# Patient Record
Sex: FEMALE | Race: WHITE | NOT HISPANIC OR LATINO | Employment: OTHER | URBAN - METROPOLITAN AREA
[De-identification: names, ages, dates, MRNs, and addresses within clinical notes are randomized per-mention and may not be internally consistent; named-entity substitution may affect disease eponyms.]

---

## 2017-03-15 RX ORDER — CHLORTHALIDONE 25 MG/1
TABLET ORAL
Qty: 90 TABLET | Refills: 2 | Status: SHIPPED | OUTPATIENT
Start: 2017-03-15 | End: 2018-03-21 | Stop reason: SDUPTHER

## 2017-04-04 ENCOUNTER — HOSPITAL ENCOUNTER (EMERGENCY)
Facility: HOSPITAL | Age: 82
Discharge: HOME | End: 2017-04-04
Attending: EMERGENCY MEDICINE | Admitting: EMERGENCY MEDICINE

## 2017-04-04 ENCOUNTER — APPOINTMENT (OUTPATIENT)
Dept: GENERAL RADIOLOGY | Facility: HOSPITAL | Age: 82
End: 2017-04-04

## 2017-04-04 ENCOUNTER — APPOINTMENT (OUTPATIENT)
Dept: CT IMAGING | Facility: HOSPITAL | Age: 82
End: 2017-04-04

## 2017-04-04 VITALS
TEMPERATURE: 98.5 F | HEART RATE: 72 BPM | BODY MASS INDEX: 23.41 KG/M2 | HEIGHT: 61 IN | DIASTOLIC BLOOD PRESSURE: 85 MMHG | WEIGHT: 124 LBS | RESPIRATION RATE: 18 BRPM | OXYGEN SATURATION: 94 % | SYSTOLIC BLOOD PRESSURE: 161 MMHG

## 2017-04-04 DIAGNOSIS — J18.9 PNEUMONIA OF LEFT UPPER LOBE DUE TO INFECTIOUS ORGANISM: Primary | ICD-10-CM

## 2017-04-04 DIAGNOSIS — R07.9 CHEST PAIN, UNSPECIFIED TYPE: ICD-10-CM

## 2017-04-04 LAB
ALBUMIN SERPL-MCNC: 4.5 G/DL (ref 3.2–4.8)
ALBUMIN/GLOB SERPL: 1.5 G/DL (ref 1.5–2.5)
ALP SERPL-CCNC: 58 U/L (ref 25–100)
ALT SERPL W P-5'-P-CCNC: 20 U/L (ref 7–40)
ANION GAP SERPL CALCULATED.3IONS-SCNC: 10 MMOL/L (ref 3–11)
AST SERPL-CCNC: 27 U/L (ref 0–33)
BASOPHILS # BLD AUTO: 0.04 10*3/MM3 (ref 0–0.2)
BASOPHILS NFR BLD AUTO: 0.4 % (ref 0–1)
BILIRUB SERPL-MCNC: 0.3 MG/DL (ref 0.3–1.2)
BILIRUB UR QL STRIP: NEGATIVE
BNP SERPL-MCNC: 26 PG/ML (ref 0–100)
BUN BLD-MCNC: 16 MG/DL (ref 9–23)
BUN/CREAT SERPL: 32 (ref 7–25)
CALCIUM SPEC-SCNC: 11 MG/DL (ref 8.7–10.4)
CHLORIDE SERPL-SCNC: 92 MMOL/L (ref 99–109)
CLARITY UR: CLEAR
CO2 SERPL-SCNC: 36 MMOL/L (ref 20–31)
COLOR UR: YELLOW
CREAT BLD-MCNC: 0.5 MG/DL (ref 0.6–1.3)
DEPRECATED RDW RBC AUTO: 43.4 FL (ref 37–54)
EOSINOPHIL # BLD AUTO: 0.25 10*3/MM3 (ref 0.1–0.3)
EOSINOPHIL NFR BLD AUTO: 2.7 % (ref 0–3)
ERYTHROCYTE [DISTWIDTH] IN BLOOD BY AUTOMATED COUNT: 12.9 % (ref 11.3–14.5)
GFR SERPL CREATININE-BSD FRML MDRD: 119 ML/MIN/1.73
GLOBULIN UR ELPH-MCNC: 3.1 GM/DL
GLUCOSE BLD-MCNC: 66 MG/DL (ref 70–100)
GLUCOSE UR STRIP-MCNC: NEGATIVE MG/DL
HCT VFR BLD AUTO: 40.5 % (ref 34.5–44)
HGB BLD-MCNC: 13.5 G/DL (ref 11.5–15.5)
HGB UR QL STRIP.AUTO: NEGATIVE
HOLD SPECIMEN: NORMAL
HOLD SPECIMEN: NORMAL
IMM GRANULOCYTES # BLD: 0.02 10*3/MM3 (ref 0–0.03)
IMM GRANULOCYTES NFR BLD: 0.2 % (ref 0–0.6)
KETONES UR QL STRIP: NEGATIVE
LEUKOCYTE ESTERASE UR QL STRIP.AUTO: NEGATIVE
LIPASE SERPL-CCNC: 26 U/L (ref 6–51)
LYMPHOCYTES # BLD AUTO: 2.51 10*3/MM3 (ref 0.6–4.8)
LYMPHOCYTES NFR BLD AUTO: 27 % (ref 24–44)
MCH RBC QN AUTO: 30.7 PG (ref 27–31)
MCHC RBC AUTO-ENTMCNC: 33.3 G/DL (ref 32–36)
MCV RBC AUTO: 92 FL (ref 80–99)
MONOCYTES # BLD AUTO: 0.57 10*3/MM3 (ref 0–1)
MONOCYTES NFR BLD AUTO: 6.1 % (ref 0–12)
NEUTROPHILS # BLD AUTO: 5.91 10*3/MM3 (ref 1.5–8.3)
NEUTROPHILS NFR BLD AUTO: 63.6 % (ref 41–71)
NITRITE UR QL STRIP: NEGATIVE
PH UR STRIP.AUTO: 8 [PH] (ref 5–8)
PLATELET # BLD AUTO: 224 10*3/MM3 (ref 150–450)
PMV BLD AUTO: 10.2 FL (ref 6–12)
POTASSIUM BLD-SCNC: 3.9 MMOL/L (ref 3.5–5.5)
PROT SERPL-MCNC: 7.6 G/DL (ref 5.7–8.2)
PROT UR QL STRIP: NEGATIVE
RBC # BLD AUTO: 4.4 10*6/MM3 (ref 3.89–5.14)
SODIUM BLD-SCNC: 138 MMOL/L (ref 132–146)
SP GR UR STRIP: 1.01 (ref 1–1.03)
TROPONIN I SERPL-MCNC: 0 NG/ML (ref 0–0.07)
TROPONIN I SERPL-MCNC: 0 NG/ML (ref 0–0.07)
UROBILINOGEN UR QL STRIP: NORMAL
WBC NRBC COR # BLD: 9.3 10*3/MM3 (ref 3.5–10.8)
WHOLE BLOOD HOLD SPECIMEN: NORMAL
WHOLE BLOOD HOLD SPECIMEN: NORMAL

## 2017-04-04 PROCEDURE — 83690 ASSAY OF LIPASE: CPT | Performed by: EMERGENCY MEDICINE

## 2017-04-04 PROCEDURE — 71010 HC CHEST PA OR AP: CPT

## 2017-04-04 PROCEDURE — 96365 THER/PROPH/DIAG IV INF INIT: CPT

## 2017-04-04 PROCEDURE — 80053 COMPREHEN METABOLIC PANEL: CPT | Performed by: EMERGENCY MEDICINE

## 2017-04-04 PROCEDURE — 71250 CT THORAX DX C-: CPT

## 2017-04-04 PROCEDURE — 25010000003 CEFTRIAXONE PER 250 MG: Performed by: PHYSICIAN ASSISTANT

## 2017-04-04 PROCEDURE — 93005 ELECTROCARDIOGRAM TRACING: CPT | Performed by: EMERGENCY MEDICINE

## 2017-04-04 PROCEDURE — 81003 URINALYSIS AUTO W/O SCOPE: CPT | Performed by: PHYSICIAN ASSISTANT

## 2017-04-04 PROCEDURE — 36415 COLL VENOUS BLD VENIPUNCTURE: CPT

## 2017-04-04 PROCEDURE — 84484 ASSAY OF TROPONIN QUANT: CPT

## 2017-04-04 PROCEDURE — 85025 COMPLETE CBC W/AUTO DIFF WBC: CPT | Performed by: EMERGENCY MEDICINE

## 2017-04-04 PROCEDURE — 96375 TX/PRO/DX INJ NEW DRUG ADDON: CPT

## 2017-04-04 PROCEDURE — 94640 AIRWAY INHALATION TREATMENT: CPT

## 2017-04-04 PROCEDURE — 99283 EMERGENCY DEPT VISIT LOW MDM: CPT

## 2017-04-04 PROCEDURE — 83880 ASSAY OF NATRIURETIC PEPTIDE: CPT | Performed by: EMERGENCY MEDICINE

## 2017-04-04 RX ORDER — GABAPENTIN 400 MG/1
400 CAPSULE ORAL 4 TIMES DAILY
COMMUNITY

## 2017-04-04 RX ORDER — HYDROCHLOROTHIAZIDE 25 MG/1
25 TABLET ORAL DAILY
COMMUNITY
End: 2018-08-13

## 2017-04-04 RX ORDER — IPRATROPIUM BROMIDE AND ALBUTEROL SULFATE 2.5; .5 MG/3ML; MG/3ML
3 SOLUTION RESPIRATORY (INHALATION) ONCE
Status: COMPLETED | OUTPATIENT
Start: 2017-04-04 | End: 2017-04-04

## 2017-04-04 RX ORDER — CEFDINIR 300 MG/1
300 CAPSULE ORAL 2 TIMES DAILY
Qty: 14 CAPSULE | Refills: 0 | Status: SHIPPED | OUTPATIENT
Start: 2017-04-04 | End: 2017-04-24

## 2017-04-04 RX ORDER — CEFTRIAXONE SODIUM 1 G/50ML
1 INJECTION, SOLUTION INTRAVENOUS ONCE
Status: COMPLETED | OUTPATIENT
Start: 2017-04-04 | End: 2017-04-04

## 2017-04-04 RX ORDER — SODIUM CHLORIDE 0.9 % (FLUSH) 0.9 %
10 SYRINGE (ML) INJECTION AS NEEDED
Status: DISCONTINUED | OUTPATIENT
Start: 2017-04-04 | End: 2017-04-04 | Stop reason: HOSPADM

## 2017-04-04 RX ORDER — INSULIN GLARGINE 100 [IU]/ML
55 INJECTION, SOLUTION SUBCUTANEOUS DAILY
COMMUNITY
End: 2017-10-30

## 2017-04-04 RX ORDER — FAMOTIDINE 10 MG/ML
20 INJECTION, SOLUTION INTRAVENOUS ONCE
Status: COMPLETED | OUTPATIENT
Start: 2017-04-04 | End: 2017-04-04

## 2017-04-04 RX ORDER — ASPIRIN 81 MG/1
324 TABLET, CHEWABLE ORAL ONCE
Status: COMPLETED | OUTPATIENT
Start: 2017-04-04 | End: 2017-04-04

## 2017-04-04 RX ADMIN — NITROGLYCERIN 1 INCH: 20 OINTMENT TOPICAL at 11:49

## 2017-04-04 RX ADMIN — CEFTRIAXONE SODIUM 1 G: 1 INJECTION, SOLUTION INTRAVENOUS at 16:50

## 2017-04-04 RX ADMIN — IPRATROPIUM BROMIDE AND ALBUTEROL SULFATE 3 ML: .5; 3 SOLUTION RESPIRATORY (INHALATION) at 14:58

## 2017-04-04 RX ADMIN — ASPIRIN 324 MG: 81 TABLET, CHEWABLE ORAL at 11:48

## 2017-04-04 RX ADMIN — FAMOTIDINE 20 MG: 10 INJECTION, SOLUTION INTRAVENOUS at 11:51

## 2017-04-04 NOTE — ED PROVIDER NOTES
"Subjective   HPI Comments: 81-year-old female complains of left chest pain and some increasing shortness of breath.  The symptoms began about 5:30 this morning at rest.  The patient took an antacid without relief.  She then took nitroglycerin and got relief on the second dose.  The pain radiated into the back of her left shoulder.  She's had no associated nausea or vomiting.  No diaphoresis.  The patient also notes that she's had some increase in her blood glucose levels in the past few days (in the 300s).  Past medical history coronary artery disease with a stent in the circumflex in 2014, insulin-dependent diabetes type 2, recurrent pneumonia, hypertension, hyperlipidemia, asthma.  Her cardiologist is Dr. Whitley.  Her PCP is KERI Davenport.  She is a nonsmoker.  No alcohol or drug use.    Patient is a 81 y.o. female presenting with chest pain.   History provided by:  Patient  Chest Pain   Pain location:  L chest  Pain quality: burning    Radiates to: left shoulder.  Pain severity:  Mild (currently describes as no pain but mild \"heartburn\")  Onset quality:  Sudden  Duration: onset this morning at 0530.  Progression:  Partially resolved  Chronicity:  New  Context: at rest    Relieved by:  Nitroglycerin  Worsened by:  Nothing  Associated symptoms: heartburn and shortness of breath    Associated symptoms: no abdominal pain, no anxiety, no back pain, no cough, no diaphoresis, no dizziness, no fatigue, no fever, no headache, no lower extremity edema, no nausea, no orthopnea, no palpitations, no syncope, no vomiting and no weakness        Review of Systems   Constitutional: Negative for chills, diaphoresis, fatigue and fever.   HENT: Negative for congestion, ear pain, nosebleeds, rhinorrhea and sore throat.    Eyes: Negative for pain, discharge and visual disturbance.   Respiratory: Positive for shortness of breath. Negative for cough and wheezing.    Cardiovascular: Positive for chest pain. Negative for " palpitations, orthopnea, leg swelling and syncope.   Gastrointestinal: Positive for heartburn. Negative for abdominal pain, blood in stool, diarrhea, nausea and vomiting.   Endocrine: Negative.    Genitourinary: Negative for dysuria, hematuria and urgency.   Musculoskeletal: Negative for arthralgias and back pain.   Skin: Negative for pallor and rash.   Allergic/Immunologic: Negative for immunocompromised state.   Neurological: Negative for dizziness, speech difficulty, weakness and headaches.   Hematological: Negative for adenopathy. Does not bruise/bleed easily.   Psychiatric/Behavioral: Negative.        Past Medical History:   Diagnosis Date   • Cerebrovascular disease    • Chest pain    • Coronary artery disease     no recent ischemic evaluation   • Diabetes mellitus     Hemoglobin A1c of 8.3.   • Dyslipidemia     May 2012 - Total 156, triglycerides 297, HDL 47, and LDL 54.   • Dyspnea    • GERD (gastroesophageal reflux disease)    • Hypertension    • Swelling of lower extremity     improved with stockings   • Vitamin D deficiency        Allergies   Allergen Reactions   • Isosorbide        Past Surgical History:   Procedure Laterality Date   • BLADDER REPAIR     • CARDIAC CATHETERIZATION     • CARPAL TUNNEL RELEASE Bilateral    • CATARACT EXTRACTION     • CHOLECYSTECTOMY     • CORONARY STENT PLACEMENT     • HYSTERECTOMY     • SPINAL FUSION         Family History   Problem Relation Age of Onset   • Heart disease Mother    • Hypertension Mother    • Heart disease Father    • Hypertension Father    • Diabetes Brother        Social History     Social History   • Marital status:      Spouse name: N/A   • Number of children: N/A   • Years of education: N/A     Social History Main Topics   • Smoking status: Never Smoker   • Smokeless tobacco: None   • Alcohol use No   • Drug use: No   • Sexual activity: Defer     Other Topics Concern   • None     Social History Narrative           Objective   Physical Exam    Constitutional: She is oriented to person, place, and time. She appears well-developed and well-nourished. No distress.   HENT:   Head: Normocephalic and atraumatic.   Nose: Nose normal.   Mouth/Throat: Oropharynx is clear and moist.   Eyes: EOM are normal. Pupils are equal, round, and reactive to light. Left eye exhibits no discharge. No scleral icterus.   Neck: Normal range of motion. Neck supple.   Cardiovascular: Normal rate, regular rhythm and normal heart sounds.    No murmur heard.  Pulmonary/Chest: Effort normal. No respiratory distress. She has no wheezes. She has no rales. She exhibits no tenderness.   Faint left sided rhonchi.   Abdominal: Soft. Bowel sounds are normal. There is no tenderness.   Musculoskeletal: Normal range of motion. She exhibits no edema or tenderness.   Neurological: She is alert and oriented to person, place, and time.   Skin: Skin is warm and dry. No rash noted. She is not diaphoretic.   Psychiatric: She has a normal mood and affect.   Nursing note and vitals reviewed.      Procedures         ED Course  ED Course    The patient is resting comfortably.  Her O2 sats are in the mid to upper 90s on room air.  She appears in no acute distress.  She states that her chest pain has resolved.  Her chest x-ray showed some chronic changes but nothing acute.  Two sets of normal troponins at 0.00.  No acute EKG changes.  Normal white blood cell count.  No anemia.  No fever.  The patient remains concerned about the possibility of a missed pneumonia on the plain film.  A CT scan of the chest was ordered and shows a patchy area in the left upper lobe which may represent an inflammatory pneumonitis.  Given her history of recurrent pneumonias, I will treat this with antibiotics and have her follow-up with her PCP.  Her pneumonia severity index score is 70 placing her at low risk.  I will plan to discharge her home on a Omnicef and have her follow-up with her PCP this week.  I will give her a dose  of Rocephin here.  (The pt states Zithromax has never helped her in the past).  The pt has home O2 and she also has home nebulizer.              MDM    Final diagnoses:   Pneumonia of left upper lobe due to infectious organism   Chest pain, unspecified type            YURIY Page  04/04/17 5195       YURIY Page  04/05/17 6664

## 2017-04-04 NOTE — DISCHARGE INSTRUCTIONS
Rest.  Use your oxygen as needed.  Omnicef as prescribed.  Use your nebulizer as previously prescribed.  Return to ER if worse.  Follow up with your doctor this week.

## 2017-04-24 ENCOUNTER — OFFICE VISIT (OUTPATIENT)
Dept: CARDIOLOGY | Facility: CLINIC | Age: 82
End: 2017-04-24

## 2017-04-24 VITALS
BODY MASS INDEX: 23.41 KG/M2 | DIASTOLIC BLOOD PRESSURE: 60 MMHG | WEIGHT: 124 LBS | SYSTOLIC BLOOD PRESSURE: 140 MMHG | HEIGHT: 61 IN | HEART RATE: 80 BPM

## 2017-04-24 DIAGNOSIS — I20.9 ISCHEMIC CHEST PAIN (HCC): Primary | ICD-10-CM

## 2017-04-24 DIAGNOSIS — I25.83 CORONARY ARTERY DISEASE DUE TO LIPID RICH PLAQUE: ICD-10-CM

## 2017-04-24 DIAGNOSIS — I10 ESSENTIAL HYPERTENSION: ICD-10-CM

## 2017-04-24 DIAGNOSIS — I65.23 BILATERAL CAROTID ARTERY STENOSIS: ICD-10-CM

## 2017-04-24 DIAGNOSIS — E11.65 TYPE 2 DIABETES MELLITUS WITH HYPERGLYCEMIA, WITH LONG-TERM CURRENT USE OF INSULIN (HCC): ICD-10-CM

## 2017-04-24 DIAGNOSIS — I25.10 CORONARY ARTERY DISEASE DUE TO LIPID RICH PLAQUE: ICD-10-CM

## 2017-04-24 DIAGNOSIS — Z79.4 TYPE 2 DIABETES MELLITUS WITH HYPERGLYCEMIA, WITH LONG-TERM CURRENT USE OF INSULIN (HCC): ICD-10-CM

## 2017-04-24 DIAGNOSIS — E78.2 MIXED HYPERLIPIDEMIA: ICD-10-CM

## 2017-04-24 PROCEDURE — 99214 OFFICE O/P EST MOD 30 MIN: CPT | Performed by: INTERNAL MEDICINE

## 2017-04-24 RX ORDER — NITROFURANTOIN 25; 75 MG/1; MG/1
50 CAPSULE ORAL DAILY
Status: ON HOLD | COMMUNITY
End: 2018-11-14

## 2017-04-24 NOTE — PROGRESS NOTES
Fani Bird  1935  [phone]          PCP: Taylor Mancera, APRN  [address] [facility] [address]    IDENTIFICATION: A 81 y.o. female  from [place].      PROBLEM LIST:  1. CAD:  a. On 01/10/2014: NSTEMI with 3.0 x 15 Integrity BMS to ostium of circumflex per MGR. Normal LVEF.  b. September 2015, admission to [facility] with ACS with negative enzymes and negative EKG.     2. Cerebrovascular disease:  a. TIA/CVA, September 2013.  b. Carotid CTA, September 2013: 60% to 70% left carotid bulb stenosis, severe right vertebral stenosis.  3. Lower extremity swelling and dyspnea:  a. Improved with iron supplementation and stockings.   4. Lower extremity rash following implantation of sulfa antibiotic.  5. Hypertension.  6. Dyslipidemia:  a. May 2012 - Total 156, triglycerides 297, HDL 47, and LDL 54.  7. GERD.  8. Vitamin D deficiency.   9. Hospitalization, September 2013, with pneumonia, rhabdomyolysis, and CVA.  10. Diabetes mellitus.  a. Hemoglobin A1c of 8.3.  11. Prior surgeries:  a. Hysterectomy.  b. Carpal tunnel release bilaterally.  c. Gallbladder resection.  d. Back surgery.  Bladder tack.    Chief Complaint   Patient presents with   • Follow-up     CAD           Allergies  Allergies   Allergen Reactions   • Isosorbide        Current Medications    Current Outpatient Prescriptions:   •  albuterol (VENTOLIN HFA) 108 (90 BASE) MCG/ACT inhaler, every 4 (four) hours., Disp: , Rfl:   •  amLODIPine (NORVASC) 10 MG tablet, Take  by mouth daily., Disp: , Rfl:   •  ascorbic acid (VITAMIN C) 1000 MG tablet, Take 1 tablet by mouth Daily., Disp: , Rfl:   •  aspirin 81 MG tablet, Take  by mouth daily., Disp: , Rfl:   •  atorvastatin (LIPITOR) 10 MG tablet, Take  by mouth Daily., Disp: , Rfl:   •  carBAMazepine (TEGRETOL) 200 MG tablet, Take 1 tablet by mouth 2 (Two) Times a Day., Disp: , Rfl:   •  celecoxib (CELEBREX) 200 MG capsule, Take  by mouth Every Other Day., Disp: , Rfl:   •   chlorthalidone (HYGROTON) 25 MG tablet, TAKE ONE TABLET BY MOUTH DAILY, Disp: 90 tablet, Rfl: 2  •  Cholecalciferol (VITAMIN D PO), Take  by mouth daily., Disp: , Rfl:   •  Cranberry 1000 MG capsule, Take  by mouth Daily., Disp: , Rfl:   •  Cyanocobalamin (VITAMIN B-12 PO), Take 1 tablet by mouth 1 (one) time per week., Disp: , Rfl:   •  cycloSPORINE (RESTASIS) 0.05 % ophthalmic emulsion, Apply  to eye 2 (two) times a day., Disp: , Rfl:   •  diazepam (VALIUM) 2 MG tablet, Take  by mouth Every 6 (Six) Hours As Needed., Disp: , Rfl:   •  estropipate (OGEN) 1.5 MG tablet, Take  by mouth daily., Disp: , Rfl:   •  ferrous sulfate 325 (65 FE) MG tablet, Take  by mouth Daily. 2 daily, Disp: , Rfl:   •  fluticasone (FLOVENT HFA) 220 MCG/ACT inhaler, daily., Disp: , Rfl:   •  gabapentin (NEURONTIN) 400 MG capsule, Take 400 mg by mouth 4 (Four) Times a Day., Disp: , Rfl:   •  hydrochlorothiazide (HYDRODIURIL) 25 MG tablet, Take 25 mg by mouth Daily., Disp: , Rfl:   •  insulin glargine (LANTUS) 100 UNIT/ML injection, Inject 55 Units under the skin Daily., Disp: , Rfl:   •  losartan (COZAAR) 100 MG tablet, Take  by mouth daily., Disp: , Rfl:   •  morphine (MSIR) 15 MG tablet, Take 1 tablet by mouth every 12 (twelve) hours., Disp: , Rfl:   •  nitrofurantoin, macrocrystal-monohydrate, (MACROBID) 100 MG capsule, Take 50 mg by mouth Daily., Disp: , Rfl:   •  nitroglycerin (NITROSTAT) 0.4 MG SL tablet, Place 1 tablet under the tongue every 5 (five) minutes as needed for chest pain., Disp: 25 tablet, Rfl: 3  •  Omega-3 Fatty Acids (FISH OIL PO), Take  by mouth daily., Disp: , Rfl:   •  pantoprazole (PROTONIX) 40 MG EC tablet, Take  by mouth 2 (two) times a day., Disp: , Rfl:   •  tiZANidine (ZANAFLEX) 4 MG tablet, Take  by mouth., Disp: , Rfl:   •  tolterodine LA (DETROL LA) 4 MG 24 hr capsule, Take 1 capsule by mouth daily., Disp: , Rfl:   •  zolpidem (AMBIEN) 10 MG tablet, Take  by mouth At Night As Needed., Disp: , Rfl:  "    History of Present Illness     Pt with recent ER evaluation after being seen at [facility] with shortness of breath and nitrate responsive chest discomfort.  She was noted with upper lobe pneumonitis placed on antibiotics and had some improvement.  She's had no recurrence of such.  She scheduled move in with her daughter later this spring.  She continues to the less than less active walking with a walker and having chronic back pain.  She has been committed to insulin therapy up to 55 units in having some lability of such  ROS:  All systems have been reviewed and are negative with the exception of those mentioned in the HPI.    OBJECTIVE:  Vitals:    04/24/17 1440   BP: 140/60   BP Location: Right arm   Patient Position: Sitting   Pulse: 80   Weight: 124 lb (56.2 kg)   Height: 61\" (154.9 cm)     Physical Exam   Constitutional: She appears well-developed and well-nourished.   Neck: Normal range of motion. Neck supple. No hepatojugular reflux and no JVD present. Carotid bruit is not present. No tracheal deviation present. No thyromegaly present.   Cardiovascular: Normal rate, regular rhythm, S1 normal, S2 normal, intact distal pulses and normal pulses.  PMI is not displaced.  Exam reveals no gallop, no distant heart sounds, no friction rub, no midsystolic click and no opening snap.    Murmur heard.  Pulses:       Carotid pulses are on the right side with bruit, and on the left side with bruit.       Radial pulses are 2+ on the right side, and 2+ on the left side.        Dorsalis pedis pulses are 2+ on the right side, and 2+ on the left side.        Posterior tibial pulses are 2+ on the right side, and 2+ on the left side.   Pulmonary/Chest: Effort normal and breath sounds normal. She has no wheezes. She has no rales.   Abdominal: Soft. Bowel sounds are normal. She exhibits no mass. There is no tenderness. There is no guarding.       Diagnostic Data:  Procedures      ASSESSMENT:   Diagnosis Plan   1. " Ischemic chest pain     2. Coronary artery disease due to lipid rich plaque     3. Type 2 diabetes mellitus with hyperglycemia, with long-term current use of insulin     4. Mixed hyperlipidemia     5. Essential hypertension     6. Bilateral carotid artery stenosis  US Carotid Bilateral       PLAN:  1. Coronary artery disease, post percutaneous coronary intervention and stenting with low normal left ventricular ejection fraction.  Would consider ischemic evaluation however given her debility and comorbidities would recommend medical management to continue at current  2. Carotid stenosis, asymptomatic.  Screening carotid duplex at next visit  3. Hypertension, controlled. I  added chlorthalidone 25 to her current regimen at last visit  4. Dyslipidemia, on statin therapy.      KERI Carpenter, thank you for referring Ms. Bird for evaluation.  I have forwarded my electronically generated recommendations to you for review.  Please do not hesitate to call with any questions.      Chinmay Whitley MD, FACC

## 2017-06-02 DIAGNOSIS — I65.23 BILATERAL CAROTID ARTERY STENOSIS: Primary | ICD-10-CM

## 2017-09-12 ENCOUNTER — TRANSCRIBE ORDERS (OUTPATIENT)
Dept: MAMMOGRAPHY | Facility: HOSPITAL | Age: 82
End: 2017-09-12

## 2017-09-12 DIAGNOSIS — Z13.9 SCREENING: Primary | ICD-10-CM

## 2017-09-26 ENCOUNTER — APPOINTMENT (OUTPATIENT)
Dept: MAMMOGRAPHY | Facility: HOSPITAL | Age: 82
End: 2017-09-26

## 2017-10-13 ENCOUNTER — HOSPITAL ENCOUNTER (OUTPATIENT)
Dept: MAMMOGRAPHY | Facility: HOSPITAL | Age: 82
Discharge: HOME | End: 2017-10-13
Admitting: OBSTETRICS & GYNECOLOGY

## 2017-10-13 DIAGNOSIS — Z13.9 SCREENING: ICD-10-CM

## 2017-10-13 PROCEDURE — G0202 SCR MAMMO BI INCL CAD: HCPCS

## 2017-10-13 PROCEDURE — 77063 BREAST TOMOSYNTHESIS BI: CPT

## 2017-10-30 ENCOUNTER — HOSPITAL ENCOUNTER (OUTPATIENT)
Dept: CARDIOLOGY | Facility: HOSPITAL | Age: 82
Discharge: HOME | End: 2017-10-30
Attending: INTERNAL MEDICINE | Admitting: INTERNAL MEDICINE

## 2017-10-30 ENCOUNTER — OFFICE VISIT (OUTPATIENT)
Dept: CARDIOLOGY | Facility: CLINIC | Age: 82
End: 2017-10-30

## 2017-10-30 VITALS
OXYGEN SATURATION: 96 % | HEIGHT: 61 IN | BODY MASS INDEX: 23.6 KG/M2 | DIASTOLIC BLOOD PRESSURE: 52 MMHG | HEART RATE: 77 BPM | SYSTOLIC BLOOD PRESSURE: 122 MMHG | WEIGHT: 125 LBS

## 2017-10-30 DIAGNOSIS — I10 ESSENTIAL HYPERTENSION: ICD-10-CM

## 2017-10-30 DIAGNOSIS — E78.2 MIXED HYPERLIPIDEMIA: ICD-10-CM

## 2017-10-30 DIAGNOSIS — I25.10 CORONARY ARTERY DISEASE INVOLVING NATIVE CORONARY ARTERY OF NATIVE HEART WITHOUT ANGINA PECTORIS: Primary | ICD-10-CM

## 2017-10-30 DIAGNOSIS — I65.23 BILATERAL CAROTID ARTERY STENOSIS: ICD-10-CM

## 2017-10-30 DIAGNOSIS — I67.9 CVD (CEREBROVASCULAR DISEASE): ICD-10-CM

## 2017-10-30 PROCEDURE — 93880 EXTRACRANIAL BILAT STUDY: CPT | Performed by: INTERNAL MEDICINE

## 2017-10-30 PROCEDURE — 93880 EXTRACRANIAL BILAT STUDY: CPT

## 2017-10-30 PROCEDURE — 99214 OFFICE O/P EST MOD 30 MIN: CPT | Performed by: INTERNAL MEDICINE

## 2017-10-30 RX ORDER — MOMETASONE FUROATE 50 UG/1
2 SPRAY, METERED NASAL EVERY EVENING
COMMUNITY

## 2017-10-30 RX ORDER — FEXOFENADINE HCL 180 MG/1
180 TABLET ORAL 2 TIMES DAILY
COMMUNITY

## 2017-10-30 RX ORDER — MORPHINE SULFATE 30 MG/1
30 TABLET, FILM COATED, EXTENDED RELEASE ORAL 2 TIMES DAILY
COMMUNITY

## 2017-10-30 RX ORDER — AMLODIPINE BESYLATE 10 MG/1
10 TABLET ORAL DAILY
COMMUNITY
End: 2018-11-29 | Stop reason: HOSPADM

## 2017-10-30 RX ORDER — INSULIN ASPART 100 [IU]/ML
26 INJECTION, SUSPENSION SUBCUTANEOUS
COMMUNITY
End: 2020-01-01

## 2017-10-30 RX ORDER — MORPHINE SULFATE 15 MG/1
15 TABLET ORAL 2 TIMES DAILY PRN
COMMUNITY
End: 2020-01-01 | Stop reason: SDUPTHER

## 2017-10-30 RX ORDER — MONTELUKAST SODIUM 10 MG/1
10 TABLET ORAL NIGHTLY
COMMUNITY

## 2017-10-30 NOTE — PROGRESS NOTES
Fani Bird  1935  [phone]          PCP: Emerson Alvarez MD  [address]  [facility] [address]    IDENTIFICATION: A 81 y.o. female  from [place].      PROBLEM LIST:  1. CAD:  a. On 01/10/2014: NSTEMI with 3.0 x 15 Integrity BMS to ostium of circumflex per MGR. Normal LVEF.  b. September 2015, admission to [facility] with ACS with negative enzymes and negative EKG.     2. Cerebrovascular disease:  a. TIA/CVA, September 2013.  b. 10/17 CUS nonobstructive  3. Lower extremity swelling and dyspnea:  a. Improved with iron supplementation and stockings.   4. Lower extremity rash following implantation of sulfa antibiotic.  5. Hypertension.  6. Dyslipidemia:  a. May 2012 - Total 156, triglycerides 297, HDL 47, and LDL 54.  7. GERD.  8. Vitamin D deficiency.   9. Hospitalization, September 2013, with pneumonia, rhabdomyolysis, and CVA.  10. Diabetes mellitus.  a. Hemoglobin A1c of 8.3.  11. Prior surgeries:  a. Hysterectomy.  b. Carpal tunnel release bilaterally.  c. Gallbladder resection.  d. Back surgery.  Bladder tack.    Chief Complaint   Patient presents with   • Coronary Artery Disease   • IHD           Allergies  Allergies   Allergen Reactions   • Isosorbide    • Augmentin [Amoxicillin-Pot Clavulanate] Diarrhea and Rash       Current Medications    Current Outpatient Prescriptions:   •  albuterol (VENTOLIN HFA) 108 (90 BASE) MCG/ACT inhaler, every 4 (four) hours., Disp: , Rfl:   •  amLODIPine (NORVASC) 10 MG tablet, Take 10 mg by mouth Daily., Disp: , Rfl:   •  ascorbic acid (VITAMIN C) 1000 MG tablet, Take 1 tablet by mouth Daily., Disp: , Rfl:   •  aspirin 81 MG tablet, Take  by mouth daily., Disp: , Rfl:   •  atorvastatin (LIPITOR) 10 MG tablet, Take  by mouth Daily., Disp: , Rfl:   •  carBAMazepine (TEGRETOL) 200 MG tablet, Take 100 mg by mouth 2 (Two) Times a Day., Disp: , Rfl:   •  celecoxib (CELEBREX) 200 MG capsule, Take  by mouth Daily., Disp: , Rfl:   •   chlorthalidone (HYGROTON) 25 MG tablet, TAKE ONE TABLET BY MOUTH DAILY, Disp: 90 tablet, Rfl: 2  •  Cholecalciferol (VITAMIN D PO), Take  by mouth daily., Disp: , Rfl:   •  Cranberry 1000 MG capsule, Take  by mouth Daily., Disp: , Rfl:   •  Cyanocobalamin (VITAMIN B-12 PO), Take 1 tablet by mouth 1 (one) time per week., Disp: , Rfl:   •  cycloSPORINE (RESTASIS) 0.05 % ophthalmic emulsion, Apply  to eye 2 (two) times a day., Disp: , Rfl:   •  diazepam (VALIUM) 2 MG tablet, Take  by mouth Every 6 (Six) Hours As Needed., Disp: , Rfl:   •  estropipate (OGEN) 1.5 MG tablet, Take  by mouth daily., Disp: , Rfl:   •  ferrous sulfate 325 (65 FE) MG tablet, Take  by mouth Daily. 2 daily, Disp: , Rfl:   •  fexofenadine (ALLEGRA) 180 MG tablet, Take 180 mg by mouth 2 (Two) Times a Day., Disp: , Rfl:   •  fluticasone (FLOVENT HFA) 220 MCG/ACT inhaler, daily., Disp: , Rfl:   •  gabapentin (NEURONTIN) 400 MG capsule, Take 400 mg by mouth 4 (Four) Times a Day., Disp: , Rfl:   •  hydrochlorothiazide (HYDRODIURIL) 25 MG tablet, Take 25 mg by mouth Daily., Disp: , Rfl:   •  insulin aspart prot-insulin aspart (novoLOG 70/30) (70-30) 100 UNIT/ML injection, Inject  under the skin 2 (Two) Times a Day With Meals., Disp: , Rfl:   •  losartan (COZAAR) 100 MG tablet, Take  by mouth daily., Disp: , Rfl:   •  mometasone (NASONEX) 50 MCG/ACT nasal spray, 2 sprays into each nostril Daily., Disp: , Rfl:   •  montelukast (SINGULAIR) 10 MG tablet, Take 10 mg by mouth Every Night., Disp: , Rfl:   •  Morphine (MS CONTIN) 30 MG 12 hr tablet, Take 30 mg by mouth 2 (Two) Times a Day., Disp: , Rfl:   •  Morphine (MSIR) 15 MG tablet, Take 15 mg by mouth Every 4 (Four) Hours As Needed for Severe Pain ., Disp: , Rfl:   •  Multiple Vitamins-Minerals (EYE VITAMINS PO), Take  by mouth Daily., Disp: , Rfl:   •  nitrofurantoin, macrocrystal-monohydrate, (MACROBID) 100 MG capsule, Take 50 mg by mouth Daily., Disp: , Rfl:   •  nitroglycerin (NITROSTAT) 0.4 MG SL  "tablet, Place 1 tablet under the tongue every 5 (five) minutes as needed for chest pain., Disp: 25 tablet, Rfl: 3  •  Omega-3 Fatty Acids (FISH OIL PO), Take  by mouth daily., Disp: , Rfl:   •  pantoprazole (PROTONIX) 40 MG EC tablet, Take  by mouth 2 (two) times a day., Disp: , Rfl:   •  tiZANidine (ZANAFLEX) 4 MG tablet, Take  by mouth Daily., Disp: , Rfl:   •  tolterodine LA (DETROL LA) 4 MG 24 hr capsule, Take 1 capsule by mouth daily., Disp: , Rfl:     History of Present Illness     Pt in follow-up she's moved in with her daughter and states that overall she is feeling well.  She now has a great grandmother.  She is take taken no nitroglycerin as of late.  She has had some dry nose with oxygen therapy and transitioning to heat for the winter     ROS:  All systems have been reviewed and are negative with the exception of those mentioned in the HPI.    OBJECTIVE:  Vitals:    10/30/17 1409   BP: 122/52   BP Location: Right arm   Patient Position: Sitting   Pulse: 77   SpO2: 96%   Weight: 125 lb (56.7 kg)   Height: 61\" (154.9 cm)     Physical Exam   Constitutional: She appears well-developed and well-nourished.   Neck: Normal range of motion. Neck supple. No hepatojugular reflux and no JVD present. Carotid bruit is not present. No tracheal deviation present. No thyromegaly present.   Cardiovascular: Normal rate, regular rhythm, S1 normal, S2 normal, intact distal pulses and normal pulses.  PMI is not displaced.  Exam reveals no gallop, no distant heart sounds, no friction rub, no midsystolic click and no opening snap.    Murmur heard.  Pulses:       Carotid pulses are on the right side with bruit, and on the left side with bruit.       Radial pulses are 2+ on the right side, and 2+ on the left side.        Dorsalis pedis pulses are 2+ on the right side, and 2+ on the left side.        Posterior tibial pulses are 2+ on the right side, and 2+ on the left side.   Pulmonary/Chest: Effort normal and breath sounds " normal. She has no wheezes. She has no rales.   Abdominal: Soft. Bowel sounds are normal. She exhibits no mass. There is no tenderness. There is no guarding.       Diagnostic Data:  Procedures      ASSESSMENT:   Diagnosis Plan   1. Coronary artery disease involving native coronary artery of native heart without angina pectoris     2. CVD (cerebrovascular disease)     3. Essential hypertension     4. Mixed hyperlipidemia         PLAN:  1. Coronary artery disease, post percutaneous coronary intervention and stenting with low normal left ventricular ejection fraction.  Would consider ischemic evaluation however given her debility and comorbidities would recommend medical management to continue at current  2. Carotid stenosis, asymptomatic.  Screening carotid duplex at next visit  3. Hypertension, controlled. I  added chlorthalidone 25 to her current regimen at last visit  4. Dyslipidemia, on statin therapy.      Emerson Alvarez MD, thank you for referring Ms. Bird for evaluation.  I have forwarded my electronically generated recommendations to you for review.  Please do not hesitate to call with any questions.      Chinmay Whitley MD, [facility]

## 2017-10-31 LAB
BH CV ECHO MEAS - BSA(HAYCOCK): 1.6 M^2
BH CV ECHO MEAS - BSA: 1.5 M^2
BH CV ECHO MEAS - BZI_BMI: 23.6 KILOGRAMS/M^2
BH CV ECHO MEAS - BZI_METRIC_HEIGHT: 154.9 CM
BH CV ECHO MEAS - BZI_METRIC_WEIGHT: 56.7 KG
BH CV XLRA MEAS LEFT DIST CCA EDV: 16.5 CM/SEC
BH CV XLRA MEAS LEFT DIST CCA PSV: 92.7 CM/SEC
BH CV XLRA MEAS LEFT DIST ICA EDV: 17.3 CM/SEC
BH CV XLRA MEAS LEFT DIST ICA PSV: 82.5 CM/SEC
BH CV XLRA MEAS LEFT ICA/CCA RATIO: 1.4
BH CV XLRA MEAS LEFT MID ICA EDV: 17.3 CM/SEC
BH CV XLRA MEAS LEFT MID ICA PSV: 88 CM/SEC
BH CV XLRA MEAS LEFT PROX CCA EDV: 13.4 CM/SEC
BH CV XLRA MEAS LEFT PROX CCA PSV: 129 CM/SEC
BH CV XLRA MEAS LEFT PROX ECA EDV: 13.4 CM/SEC
BH CV XLRA MEAS LEFT PROX ECA PSV: 108 CM/SEC
BH CV XLRA MEAS LEFT PROX ICA EDV: 17.3 CM/SEC
BH CV XLRA MEAS LEFT PROX ICA PSV: 126 CM/SEC
BH CV XLRA MEAS LEFT PROX SCLA EDV: 0 CM/SEC
BH CV XLRA MEAS LEFT PROX SCLA PSV: 83.3 CM/SEC
BH CV XLRA MEAS LEFT VERTEBRAL A EDV: 14.1 CM/SEC
BH CV XLRA MEAS LEFT VERTEBRAL A PSV: 69.1 CM/SEC
BH CV XLRA MEAS RIGHT DIST CCA EDV: 14.1 CM/SEC
BH CV XLRA MEAS RIGHT DIST CCA PSV: 87.2 CM/SEC
BH CV XLRA MEAS RIGHT DIST ICA EDV: 20.4 CM/SEC
BH CV XLRA MEAS RIGHT DIST ICA PSV: 77 CM/SEC
BH CV XLRA MEAS RIGHT ICA/CCA RATIO: 1.2
BH CV XLRA MEAS RIGHT MID ICA EDV: 14.1 CM/SEC
BH CV XLRA MEAS RIGHT MID ICA PSV: 59.7 CM/SEC
BH CV XLRA MEAS RIGHT PROX CCA EDV: 16.5 CM/SEC
BH CV XLRA MEAS RIGHT PROX CCA PSV: 105 CM/SEC
BH CV XLRA MEAS RIGHT PROX ECA EDV: 0 CM/SEC
BH CV XLRA MEAS RIGHT PROX ECA PSV: 88 CM/SEC
BH CV XLRA MEAS RIGHT PROX ICA EDV: 18.9 CM/SEC
BH CV XLRA MEAS RIGHT PROX ICA PSV: 105 CM/SEC
BH CV XLRA MEAS RIGHT PROX SCLA EDV: 0 CM/SEC
BH CV XLRA MEAS RIGHT PROX SCLA PSV: 104 CM/SEC
BH CV XLRA MEAS RIGHT VERTEBRAL A EDV: 13.4 CM/SEC
BH CV XLRA MEAS RIGHT VERTEBRAL A PSV: 64.4 CM/SEC

## 2018-01-18 DIAGNOSIS — Z00.00 HEALTH CARE MAINTENANCE: ICD-10-CM

## 2018-01-18 RX ORDER — NITROGLYCERIN 0.4 MG/1
0.4 TABLET SUBLINGUAL
Qty: 25 TABLET | Refills: 3 | Status: SHIPPED | OUTPATIENT
Start: 2018-01-18 | End: 2020-01-01 | Stop reason: SDUPTHER

## 2018-03-21 RX ORDER — CHLORTHALIDONE 25 MG/1
25 TABLET ORAL DAILY
Qty: 90 TABLET | Refills: 2 | Status: SHIPPED | OUTPATIENT
Start: 2018-03-21 | End: 2019-03-20

## 2018-05-11 ENCOUNTER — TELEPHONE (OUTPATIENT)
Dept: CARDIOLOGY | Facility: CLINIC | Age: 83
End: 2018-05-11

## 2018-05-11 NOTE — TELEPHONE ENCOUNTER
Patient daughter called and stated that she has been experiencing lower extremity edema. She also stated one foot is cold. She stated that it has not changed color but is hurting patient. Advised her to see if they can see pcp today for eval. Patients daughter instead they see cardiology as they do not trust pcp judgement. Also recommended if this is painful and worsens to go to ER. Patient daughter felt most comfortable for patient to see cardiology.     Patient set up to see heart and valve on Monday at 2:45 and advised to arrive at 2:30. Provided them with number.

## 2018-05-14 ENCOUNTER — HOSPITAL ENCOUNTER (OUTPATIENT)
Dept: CARDIOLOGY | Facility: HOSPITAL | Age: 83
Discharge: HOME | End: 2018-05-14
Admitting: NURSE PRACTITIONER

## 2018-05-14 ENCOUNTER — OFFICE VISIT (OUTPATIENT)
Dept: CARDIOLOGY | Facility: HOSPITAL | Age: 83
End: 2018-05-14

## 2018-05-14 VITALS
DIASTOLIC BLOOD PRESSURE: 67 MMHG | TEMPERATURE: 98.6 F | BODY MASS INDEX: 24.17 KG/M2 | WEIGHT: 128 LBS | RESPIRATION RATE: 19 BRPM | OXYGEN SATURATION: 96 % | HEART RATE: 73 BPM | HEIGHT: 61 IN | SYSTOLIC BLOOD PRESSURE: 150 MMHG

## 2018-05-14 DIAGNOSIS — I10 ESSENTIAL HYPERTENSION: ICD-10-CM

## 2018-05-14 DIAGNOSIS — R06.02 SHORTNESS OF BREATH: ICD-10-CM

## 2018-05-14 DIAGNOSIS — L03.116 CELLULITIS OF LEFT LOWER EXTREMITY: ICD-10-CM

## 2018-05-14 DIAGNOSIS — R06.02 SHORTNESS OF BREATH: Primary | ICD-10-CM

## 2018-05-14 DIAGNOSIS — I25.10 CORONARY ARTERY DISEASE INVOLVING NATIVE CORONARY ARTERY OF NATIVE HEART WITHOUT ANGINA PECTORIS: ICD-10-CM

## 2018-05-14 PROCEDURE — 93010 ELECTROCARDIOGRAM REPORT: CPT | Performed by: INTERNAL MEDICINE

## 2018-05-14 PROCEDURE — 93005 ELECTROCARDIOGRAM TRACING: CPT | Performed by: NURSE PRACTITIONER

## 2018-05-14 PROCEDURE — 99214 OFFICE O/P EST MOD 30 MIN: CPT | Performed by: NURSE PRACTITIONER

## 2018-05-14 RX ORDER — ALBUTEROL SULFATE 2.5 MG/3ML
2.5 SOLUTION RESPIRATORY (INHALATION) EVERY 4 HOURS PRN
COMMUNITY
Start: 2018-04-17

## 2018-05-14 RX ORDER — DOXYCYCLINE HYCLATE 100 MG/1
100 CAPSULE ORAL 2 TIMES DAILY
COMMUNITY
Start: 2018-05-11 | End: 2018-05-18

## 2018-05-14 RX ORDER — DOXYCYCLINE HYCLATE 100 MG
100 TABLET ORAL 2 TIMES DAILY
Qty: 6 TABLET | Refills: 0 | Status: SHIPPED | OUTPATIENT
Start: 2018-05-14 | End: 2018-08-13

## 2018-05-14 RX ORDER — ESTRADIOL 0.5 MG/1
0.5 TABLET ORAL DAILY
COMMUNITY
Start: 2018-03-28 | End: 2018-05-14

## 2018-05-14 NOTE — PROGRESS NOTES
Encounter Date:05/14/2018      Patient ID: Fani Bird is a 82 y.o. female.        Subjective:     Chief Complaint: Establish Care (c/o Swelling )     History of Present Illness patient presents to the office today for ongoing evaluation of her swelling in her left LLE. She notes that she has been experiencing swelling in her left lower extremity over the last few days with significant erythema to toes 3 and 4. She was seen at [facility] on 5/11/18 and was started on doxycycline. She notes that since starting the doxycycline the erythema has improved. Patient had a venous duplex of LLE which was negative for DVT. Patient also had arterial ultrasound of BLEs which were normal. Patient notes that she has taken ntg once in the last week. She notes dyspnea on exertion that improves with rest.     Patient Active Problem List   Diagnosis   • Chest pain   • Cerebrovascular disease   • Coronary artery disease   • Hypertension   • Dyslipidemia   • GERD (gastroesophageal reflux disease)   • Vitamin D deficiency   • Diabetes mellitus   • Dyspnea       Past Surgical History:   Procedure Laterality Date   • BLADDER REPAIR     • BREAST BIOPSY     • BREAST CYST ASPIRATION     • CARDIAC CATHETERIZATION     • CARPAL TUNNEL RELEASE Bilateral    • CATARACT EXTRACTION     • CHOLECYSTECTOMY     • CORONARY STENT PLACEMENT     • HYSTERECTOMY     • SPINAL FUSION         Allergies   Allergen Reactions   • Isosorbide    • Augmentin [Amoxicillin-Pot Clavulanate] Diarrhea and Rash         Current Outpatient Prescriptions:   •  amLODIPine (NORVASC) 10 MG tablet, Take 10 mg by mouth Daily., Disp: , Rfl:   •  aspirin 81 MG tablet, Take  by mouth daily., Disp: , Rfl:   •  atorvastatin (LIPITOR) 10 MG tablet, Take  by mouth Daily., Disp: , Rfl:   •  chlorthalidone (HYGROTON) 25 MG tablet, Take 1 tablet by mouth Daily., Disp: 90 tablet, Rfl: 2  •  doxycycline (VIBRAMYCIN) 100 MG capsule, Take 100 mg by mouth 2 (Two) Times a Day.,  Disp: , Rfl:   •  losartan (COZAAR) 100 MG tablet, Take  by mouth daily., Disp: , Rfl:   •  nitrofurantoin, macrocrystal-monohydrate, (MACROBID) 100 MG capsule, Take 50 mg by mouth Daily., Disp: , Rfl:   •  nitroglycerin (NITROSTAT) 0.4 MG SL tablet, Place 1 tablet under the tongue Every 5 (Five) Minutes As Needed for Chest Pain., Disp: 25 tablet, Rfl: 3  •  albuterol (PROVENTIL) (2.5 MG/3ML) 0.083% nebulizer solution, Take 2.5 mg by nebulization Every 4 (Four) Hours As Needed., Disp: , Rfl:   •  albuterol (VENTOLIN HFA) 108 (90 BASE) MCG/ACT inhaler, every 4 (four) hours., Disp: , Rfl:   •  ascorbic acid (VITAMIN C) 1000 MG tablet, Take 1 tablet by mouth Daily., Disp: , Rfl:   •  carBAMazepine (TEGRETOL) 200 MG tablet, Take 100 mg by mouth 2 (Two) Times a Day., Disp: , Rfl:   •  celecoxib (CELEBREX) 200 MG capsule, Take  by mouth Daily., Disp: , Rfl:   •  Cholecalciferol (VITAMIN D PO), Take  by mouth daily., Disp: , Rfl:   •  Cranberry 1000 MG capsule, Take  by mouth Daily., Disp: , Rfl:   •  Cyanocobalamin (VITAMIN B-12 PO), Take 1 tablet by mouth 1 (one) time per week., Disp: , Rfl:   •  cycloSPORINE (RESTASIS) 0.05 % ophthalmic emulsion, Apply  to eye 2 (two) times a day., Disp: , Rfl:   •  diazepam (VALIUM) 2 MG tablet, Take  by mouth Every 6 (Six) Hours As Needed., Disp: , Rfl:   •  doxycycline (VIBRAMYICN) 100 MG tablet, Take 1 tablet by mouth 2 (Two) Times a Day., Disp: 6 tablet, Rfl: 0  •  estropipate (OGEN) 1.5 MG tablet, Take  by mouth daily., Disp: , Rfl:   •  ferrous sulfate 325 (65 FE) MG tablet, Take  by mouth Daily. 2 daily, Disp: , Rfl:   •  fexofenadine (ALLEGRA) 180 MG tablet, Take 180 mg by mouth 2 (Two) Times a Day., Disp: , Rfl:   •  fluticasone (FLOVENT HFA) 220 MCG/ACT inhaler, daily., Disp: , Rfl:   •  gabapentin (NEURONTIN) 400 MG capsule, Take 400 mg by mouth 4 (Four) Times a Day., Disp: , Rfl:   •  hydrochlorothiazide (HYDRODIURIL) 25 MG tablet, Take 25 mg by mouth Daily., Disp: , Rfl:    •  insulin aspart prot-insulin aspart (novoLOG 70/30) (70-30) 100 UNIT/ML injection, Inject  under the skin 2 (Two) Times a Day With Meals., Disp: , Rfl:   •  mometasone (NASONEX) 50 MCG/ACT nasal spray, 2 sprays into each nostril Daily., Disp: , Rfl:   •  montelukast (SINGULAIR) 10 MG tablet, Take 10 mg by mouth Every Night., Disp: , Rfl:   •  Morphine (MS CONTIN) 30 MG 12 hr tablet, Take 30 mg by mouth 2 (Two) Times a Day., Disp: , Rfl:   •  Morphine (MSIR) 15 MG tablet, Take 15 mg by mouth Every 4 (Four) Hours As Needed for Severe Pain ., Disp: , Rfl:   •  Multiple Vitamins-Minerals (EYE VITAMINS PO), Take  by mouth Daily., Disp: , Rfl:   •  Omega-3 Fatty Acids (FISH OIL PO), Take  by mouth daily., Disp: , Rfl:   •  pantoprazole (PROTONIX) 40 MG EC tablet, Take  by mouth 2 (two) times a day., Disp: , Rfl:   •  tiZANidine (ZANAFLEX) 4 MG tablet, Take  by mouth Daily., Disp: , Rfl:   •  tolterodine LA (DETROL LA) 4 MG 24 hr capsule, Take 1 capsule by mouth daily., Disp: , Rfl:     The following portions of the chart were reviewed and updated as appropriate: Allergies, current medications, past family history, social history, past medical history.     Review of Systems   Constitution: Positive for malaise/fatigue. Negative for chills, decreased appetite, diaphoresis, fever, weakness, night sweats, weight gain and weight loss.   HENT: Negative for congestion, hearing loss, hoarse voice and nosebleeds.    Eyes: Negative for blurred vision, visual disturbance and visual halos.   Cardiovascular: Positive for chest pain (resolved), dyspnea on exertion and leg swelling. Negative for claudication, cyanosis, irregular heartbeat, near-syncope, orthopnea, palpitations, paroxysmal nocturnal dyspnea and syncope.   Respiratory: Negative for cough, hemoptysis, shortness of breath, sleep disturbances due to breathing, snoring, sputum production and wheezing.    Endocrine: Positive for polydipsia.   Hematologic/Lymphatic:  "Negative for bleeding problem. Does not bruise/bleed easily.   Skin: Positive for rash. Negative for color change, dry skin and itching.   Musculoskeletal: Negative for arthritis, falls, joint pain, joint swelling and myalgias.   Gastrointestinal: Negative for bloating, abdominal pain, constipation, diarrhea, flatus, heartburn, hematemesis, hematochezia, melena, nausea and vomiting.   Genitourinary: Negative for dysuria, frequency, hematuria, nocturia and urgency.   Neurological: Negative for excessive daytime sleepiness, dizziness, headaches, light-headedness and loss of balance.   Psychiatric/Behavioral: Negative for depression. The patient has insomnia. The patient is not nervous/anxious.            Objective:     Vitals:    05/14/18 1516 05/14/18 1517   BP: 162/70 150/67   BP Location: Left arm Left arm   Patient Position: Sitting Sitting   Cuff Size: Adult    Pulse: 72 73   Resp: 19    Temp: 98.6 °F (37 °C)    TempSrc: Temporal Artery     SpO2: 96%    Weight: 58.1 kg (128 lb)    Height: 154.9 cm (61\")          Physical Exam   Constitutional: She is oriented to person, place, and time. She appears well-developed and well-nourished. She is active and cooperative. No distress.   HENT:   Head: Normocephalic and atraumatic.   Mouth/Throat: Oropharynx is clear and moist.   Eyes: Conjunctivae and EOM are normal. Pupils are equal, round, and reactive to light.   Neck: Normal range of motion. Neck supple. No JVD present. No tracheal deviation present. No thyromegaly present.   Cardiovascular: Normal rate, regular rhythm, normal heart sounds and intact distal pulses.    Pounding pulses noted BLEs  LLE warm to touch  Erythema noted to digits 3 and 4 on LLE.   Pulmonary/Chest: Effort normal and breath sounds normal.   Abdominal: Soft. Bowel sounds are normal. She exhibits no distension. There is no tenderness.   Musculoskeletal: Normal range of motion.   Neurological: She is alert and oriented to person, place, and time. "   Skin: Skin is warm, dry and intact.   Psychiatric: She has a normal mood and affect. Her behavior is normal.   Nursing note and vitals reviewed.      Lab and Diagnostic Review:      EKG NSR with left axis deviation, septal infarct, age undetermined  5/11/ 2018: WBC 8.4, hemoglobin 12.7, hematocrit 38.1, platelets 2:30, CRP 3, glucose 180, BUN 17, creatinine 0.6, sodium 135, potassium 4.2, CO2 28, AST 21, ALT 19  Left arterial ultrasound no significant stenosis  Left lower extremity venous duplex negative for DVT    Assessment and Plan:         1. Shortness of breath  Intermittent with exertion  - ECG 12 Lead; Future    2. Essential hypertension  Well controlled  HTN Education provided today including signs and symptoms, medication management, daily blood pressure monitoring. Patient encouraged to call the [facility] with any abnormal readings.     3. Cellulitis of left lower extremity  Continue doxycycline bid for 10 days  Ongoing follow-up with pcp    4. Coronary artery disease involving native coronary artery of native heart without angina pectoris  Without angina  Continue asa, lipitor, cozaar  Has taken ntg once in last month    It has been a pleasure to participate in the care of this patient.  Patient was instructed to call the [facility] with any questions, concerns, or worsening symptoms.          * Please note that portions of this note were completed with a voice recognition program. Efforts were made to edit the dictation but occasionally words are transcribed.

## 2018-08-13 ENCOUNTER — OFFICE VISIT (OUTPATIENT)
Dept: CARDIOLOGY | Facility: CLINIC | Age: 83
End: 2018-08-13

## 2018-08-13 VITALS
HEART RATE: 77 BPM | BODY MASS INDEX: 24.47 KG/M2 | DIASTOLIC BLOOD PRESSURE: 58 MMHG | HEIGHT: 61 IN | SYSTOLIC BLOOD PRESSURE: 140 MMHG | WEIGHT: 129.6 LBS

## 2018-08-13 DIAGNOSIS — I67.9 CVD (CEREBROVASCULAR DISEASE): ICD-10-CM

## 2018-08-13 DIAGNOSIS — E78.2 MIXED HYPERLIPIDEMIA: ICD-10-CM

## 2018-08-13 DIAGNOSIS — I25.10 CORONARY ARTERY DISEASE INVOLVING NATIVE CORONARY ARTERY OF NATIVE HEART WITHOUT ANGINA PECTORIS: Primary | ICD-10-CM

## 2018-08-13 DIAGNOSIS — I10 ESSENTIAL HYPERTENSION: ICD-10-CM

## 2018-08-13 PROCEDURE — 99213 OFFICE O/P EST LOW 20 MIN: CPT | Performed by: INTERNAL MEDICINE

## 2018-08-13 NOTE — PROGRESS NOTES
Fani Bird  1935  [phone]      8/13/2018      PCP: Emerson Alvarez MD  [address]  [facility] [address]    IDENTIFICATION: A 82 y.o. female  from [place].      PROBLEM LIST:  1. CAD:  a. On 01/10/2014: NSTEMI with 3.0 x 15 Integrity BMS to ostium of circumflex per MGR. Normal LVEF.  b. September 2015, admission to [facility] with ACS with negative enzymes and negative EKG.     2. Cerebrovascular disease:  a. TIA/CVA, September 2013.  b. 10/17 CUS nonobstructive  3. Lower extremity swelling and dyspnea:  a. Improved with iron supplementation and stockings.   4. Lower extremity rash following implantation of sulfa antibiotic.  5. Hypertension.  6. Dyslipidemia:  a. May 2012 - Total 156, triglycerides 297, HDL 47, and LDL 54.  7. GERD.  8. Vitamin D deficiency.   9. Hospitalization, September 2013, with pneumonia, rhabdomyolysis, and CVA.  10. Diabetes mellitus.  a. Hemoglobin A1c of 8.3.  11. Prior surgeries:  a. Hysterectomy.  b. Carpal tunnel release bilaterally.  c. Gallbladder resection.  d. Back surgery.  Bladder tack.    Chief Complaint   Patient presents with   • Coronary Artery Disease   • Cerebrovascular disease           Allergies  Allergies   Allergen Reactions   • Isosorbide    • Augmentin [Amoxicillin-Pot Clavulanate] Diarrhea and Rash       Current Medications    Current Outpatient Prescriptions:   •  albuterol (PROVENTIL) (2.5 MG/3ML) 0.083% nebulizer solution, Take 2.5 mg by nebulization Every 4 (Four) Hours As Needed., Disp: , Rfl:   •  albuterol (VENTOLIN HFA) 108 (90 BASE) MCG/ACT inhaler, 2 (Two) Times a Day., Disp: , Rfl:   •  amLODIPine (NORVASC) 10 MG tablet, Take 10 mg by mouth Daily., Disp: , Rfl:   •  ascorbic acid (VITAMIN C) 1000 MG tablet, Take 1 tablet by mouth Daily., Disp: , Rfl:   •  aspirin 81 MG tablet, Take  by mouth daily., Disp: , Rfl:   •  atorvastatin (LIPITOR) 10 MG tablet, Take 20 mg by mouth Daily., Disp: , Rfl:   •  carBAMazepine  (TEGRETOL) 200 MG tablet, Take 100 mg by mouth 2 (Two) Times a Day., Disp: , Rfl:   •  celecoxib (CELEBREX) 200 MG capsule, Take  by mouth Daily., Disp: , Rfl:   •  chlorthalidone (HYGROTON) 25 MG tablet, Take 1 tablet by mouth Daily., Disp: 90 tablet, Rfl: 2  •  Cholecalciferol (VITAMIN D PO), Take  by mouth daily., Disp: , Rfl:   •  Cranberry 1000 MG capsule, Take  by mouth Daily., Disp: , Rfl:   •  Cyanocobalamin (VITAMIN B-12 PO), Take 1 tablet by mouth 1 (one) time per week., Disp: , Rfl:   •  cycloSPORINE (RESTASIS) 0.05 % ophthalmic emulsion, Apply  to eye 2 (two) times a day., Disp: , Rfl:   •  diazepam (VALIUM) 2 MG tablet, Take 2 mg by mouth Every Night., Disp: , Rfl:   •  estropipate (OGEN) 1.5 MG tablet, Take  by mouth daily., Disp: , Rfl:   •  ferrous sulfate 325 (65 FE) MG tablet, Take  by mouth Daily. 2 daily, Disp: , Rfl:   •  fexofenadine (ALLEGRA) 180 MG tablet, Take 180 mg by mouth 2 (Two) Times a Day., Disp: , Rfl:   •  fluticasone (FLOVENT HFA) 220 MCG/ACT inhaler, daily., Disp: , Rfl:   •  gabapentin (NEURONTIN) 400 MG capsule, Take 400 mg by mouth 4 (Four) Times a Day., Disp: , Rfl:   •  insulin aspart prot-insulin aspart (novoLOG 70/30) (70-30) 100 UNIT/ML injection, Inject  under the skin into the appropriate area as directed 2 (Two) Times a Day With Meals. 34 in morning and 22 a night, Disp: , Rfl:   •  losartan (COZAAR) 100 MG tablet, Take  by mouth daily., Disp: , Rfl:   •  mometasone (NASONEX) 50 MCG/ACT nasal spray, 2 sprays into each nostril Daily., Disp: , Rfl:   •  montelukast (SINGULAIR) 10 MG tablet, Take 10 mg by mouth Every Night., Disp: , Rfl:   •  Morphine (MS CONTIN) 30 MG 12 hr tablet, Take 30 mg by mouth 2 (Two) Times a Day., Disp: , Rfl:   •  Morphine (MSIR) 15 MG tablet, Take 15 mg by mouth Every 4 (Four) Hours As Needed for Severe Pain ., Disp: , Rfl:   •  Multiple Vitamins-Minerals (EYE VITAMINS PO), Take  by mouth Daily., Disp: , Rfl:   •  Multiple Vitamins-Minerals  "(ICAPS AREDS 2 PO), Take  by mouth Daily., Disp: , Rfl:   •  nitrofurantoin, macrocrystal-monohydrate, (MACROBID) 100 MG capsule, Take 50 mg by mouth Daily., Disp: , Rfl:   •  nitroglycerin (NITROSTAT) 0.4 MG SL tablet, Place 1 tablet under the tongue Every 5 (Five) Minutes As Needed for Chest Pain., Disp: 25 tablet, Rfl: 3  •  O2 (OXYGEN), Inhale 2 L/min Every Night., Disp: , Rfl:   •  Omega-3 Fatty Acids (FISH OIL PO), Take  by mouth daily., Disp: , Rfl:   •  pantoprazole (PROTONIX) 40 MG EC tablet, Take  by mouth 2 (two) times a day., Disp: , Rfl:   •  tiZANidine (ZANAFLEX) 4 MG tablet, Take  by mouth Daily., Disp: , Rfl:   •  tolterodine LA (DETROL LA) 4 MG 24 hr capsule, Take 1 capsule by mouth daily., Disp: , Rfl:     History of Present Illness     Pt in follow-up. Litany of noncardiac issues including retinal concerns and post nasal drip and cough.   She is take taken no nitroglycerin as of late.  She has had some dry nose with oxygen therapy .    ROS:  All systems have been reviewed and are negative with the exception of those mentioned in the HPI.    OBJECTIVE:  Vitals:    08/13/18 1409   BP: 140/58   BP Location: Right arm   Patient Position: Sitting   Pulse: 77   Weight: 58.8 kg (129 lb 9.6 oz)   Height: 154.9 cm (61\")     Physical Exam   Constitutional: She appears well-developed and well-nourished.   Neck: Normal range of motion. Neck supple. No hepatojugular reflux and no JVD present. Carotid bruit is not present. No tracheal deviation present. No thyromegaly present.   Cardiovascular: Normal rate, regular rhythm, S1 normal, S2 normal, intact distal pulses and normal pulses.  PMI is not displaced.  Exam reveals no gallop, no distant heart sounds, no friction rub, no midsystolic click and no opening snap.    Murmur heard.  Pulses:       Carotid pulses are on the right side with bruit, and on the left side with bruit.       Radial pulses are 2+ on the right side, and 2+ on the left side.        Dorsalis " pedis pulses are 2+ on the right side, and 2+ on the left side.        Posterior tibial pulses are 2+ on the right side, and 2+ on the left side.   Pulmonary/Chest: Effort normal and breath sounds normal. She has no wheezes. She has no rales.   Abdominal: Soft. Bowel sounds are normal. She exhibits no mass. There is no tenderness. There is no guarding.       Diagnostic Data:  Procedures      ASSESSMENT:   Diagnosis Plan   1. Coronary artery disease involving native coronary artery of native heart without angina pectoris     2. CVD (cerebrovascular disease)     3. Essential hypertension     4. Mixed hyperlipidemia         PLAN:  1. Coronary artery disease, post percutaneous coronary intervention and stenting with low normal left ventricular ejection fraction.  Would consider ischemic evaluation however given her debility and comorbidities would recommend medical management to continue at current  2. Carotid stenosis, asymptomatic.  Screening carotid duplex at next visit  3. Hypertension, controlled. I  added chlorthalidone 25 to her current regimen at last visit  4. Dyslipidemia, on statin therapy.      Emerson Alvarez MD, thank you for referring Ms. Bird for evaluation.  I have forwarded my electronically generated recommendations to you for review.  Please do not hesitate to call with any questions.      Chinmay Whitley MD, [facility]

## 2018-09-19 ENCOUNTER — TRANSCRIBE ORDERS (OUTPATIENT)
Dept: ADMINISTRATIVE | Facility: HOSPITAL | Age: 83
End: 2018-09-19

## 2018-09-19 DIAGNOSIS — Z12.39 SCREENING BREAST EXAMINATION: Primary | ICD-10-CM

## 2018-10-23 ENCOUNTER — HOSPITAL ENCOUNTER (OUTPATIENT)
Dept: MAMMOGRAPHY | Facility: HOSPITAL | Age: 83
Discharge: HOME | End: 2018-10-23
Admitting: OBSTETRICS & GYNECOLOGY

## 2018-10-23 DIAGNOSIS — Z12.39 SCREENING BREAST EXAMINATION: ICD-10-CM

## 2018-10-23 PROCEDURE — 77063 BREAST TOMOSYNTHESIS BI: CPT

## 2018-10-23 PROCEDURE — 77067 SCR MAMMO BI INCL CAD: CPT

## 2018-11-07 ENCOUNTER — TELEPHONE (OUTPATIENT)
Dept: CARDIOLOGY | Facility: CLINIC | Age: 83
End: 2018-11-07

## 2018-11-07 NOTE — TELEPHONE ENCOUNTER
Patient's dtr called and says that the patient had had 3 episodes of chest pain center of the chest and some times it went down to her stomach.  Relieved usually with one sometimes 2 NTG.   Denies any spicey food prior to these episodes.    Says that she usually uses Eileen seltzer or tums.   Advised her get her some   Galvicon to try for this.   If she did not get relief to go to the ER and if it got worse to  give us a call back.  She says that her blood pressure was 142/58  Pulse 73 and denies any edema.

## 2018-11-09 ENCOUNTER — HOSPITAL ENCOUNTER (INPATIENT)
Facility: HOSPITAL | Age: 83
LOS: 4 days | Discharge: HOME | End: 2018-11-14
Attending: INTERNAL MEDICINE | Admitting: INTERNAL MEDICINE

## 2018-11-09 DIAGNOSIS — I21.4 NSTEMI (NON-ST ELEVATED MYOCARDIAL INFARCTION) (HCC): Primary | ICD-10-CM

## 2018-11-09 DIAGNOSIS — Z74.09 IMPAIRED FUNCTIONAL MOBILITY, BALANCE, GAIT, AND ENDURANCE: ICD-10-CM

## 2018-11-09 DIAGNOSIS — Z74.09 IMPAIRED MOBILITY AND ADLS: ICD-10-CM

## 2018-11-09 DIAGNOSIS — Z78.9 IMPAIRED MOBILITY AND ADLS: ICD-10-CM

## 2018-11-10 ENCOUNTER — APPOINTMENT (OUTPATIENT)
Dept: CARDIOLOGY | Facility: HOSPITAL | Age: 83
End: 2018-11-10

## 2018-11-10 ENCOUNTER — APPOINTMENT (OUTPATIENT)
Dept: CARDIOLOGY | Facility: HOSPITAL | Age: 83
End: 2018-11-10
Attending: INTERNAL MEDICINE

## 2018-11-10 ENCOUNTER — APPOINTMENT (OUTPATIENT)
Dept: PULMONOLOGY | Facility: HOSPITAL | Age: 83
End: 2018-11-10

## 2018-11-10 PROBLEM — I21.4 NON-STEMI (NON-ST ELEVATED MYOCARDIAL INFARCTION) (HCC): Status: ACTIVE | Noted: 2018-11-10

## 2018-11-10 LAB
ALBUMIN SERPL-MCNC: 4.31 G/DL (ref 3.2–4.8)
ALBUMIN/GLOB SERPL: 1.7 G/DL (ref 1.5–2.5)
ALP SERPL-CCNC: 74 U/L (ref 25–100)
ALT SERPL W P-5'-P-CCNC: 30 U/L (ref 7–40)
ANION GAP SERPL CALCULATED.3IONS-SCNC: 8 MMOL/L (ref 3–11)
ANION GAP SERPL CALCULATED.3IONS-SCNC: 9 MMOL/L (ref 3–11)
ARTICHOKE IGE QN: 86 MG/DL (ref 0–130)
AST SERPL-CCNC: 52 U/L (ref 0–33)
BASOPHILS # BLD AUTO: 0.03 10*3/MM3 (ref 0–0.2)
BASOPHILS # BLD AUTO: 0.04 10*3/MM3 (ref 0–0.2)
BASOPHILS NFR BLD AUTO: 0.3 % (ref 0–1)
BASOPHILS NFR BLD AUTO: 0.4 % (ref 0–1)
BH CV ECHO MEAS - AO ROOT AREA (BSA CORRECTED): 1.8
BH CV ECHO MEAS - AO ROOT AREA: 6.3 CM^2
BH CV ECHO MEAS - AO ROOT DIAM: 2.8 CM
BH CV ECHO MEAS - BSA(HAYCOCK): 1.6 M^2
BH CV ECHO MEAS - BSA: 1.6 M^2
BH CV ECHO MEAS - BZI_BMI: 24 KILOGRAMS/M^2
BH CV ECHO MEAS - BZI_METRIC_HEIGHT: 154.9 CM
BH CV ECHO MEAS - BZI_METRIC_WEIGHT: 57.6 KG
BH CV ECHO MEAS - EDV(CUBED): 37.9 ML
BH CV ECHO MEAS - EDV(TEICH): 46 ML
BH CV ECHO MEAS - EF(CUBED): 80.1 %
BH CV ECHO MEAS - EF(TEICH): 73.7 %
BH CV ECHO MEAS - ESV(CUBED): 7.5 ML
BH CV ECHO MEAS - ESV(TEICH): 12.1 ML
BH CV ECHO MEAS - FS: 41.6 %
BH CV ECHO MEAS - IVS/LVPW: 0.99
BH CV ECHO MEAS - IVSD: 1.2 CM
BH CV ECHO MEAS - LA DIMENSION: 3.6 CM
BH CV ECHO MEAS - LA/AO: 1.3
BH CV ECHO MEAS - LAD MAJOR: 5.2 CM
BH CV ECHO MEAS - LAT PEAK E' VEL: 7.2 CM/SEC
BH CV ECHO MEAS - LATERAL E/E' RATIO: 13
BH CV ECHO MEAS - LV MASS(C)D: 123.2 GRAMS
BH CV ECHO MEAS - LV MASS(C)DI: 79.1 GRAMS/M^2
BH CV ECHO MEAS - LVIDD: 3.4 CM
BH CV ECHO MEAS - LVIDS: 2 CM
BH CV ECHO MEAS - LVPWD: 1.2 CM
BH CV ECHO MEAS - MED PEAK E' VEL: 5.5 CM/SEC
BH CV ECHO MEAS - MEDIAL E/E' RATIO: 16.8
BH CV ECHO MEAS - MV A MAX VEL: 121.9 CM/SEC
BH CV ECHO MEAS - MV DEC SLOPE: 301.1 CM/SEC^2
BH CV ECHO MEAS - MV DEC TIME: 0.28 SEC
BH CV ECHO MEAS - MV E MAX VEL: 94.8 CM/SEC
BH CV ECHO MEAS - MV E/A: 0.78
BH CV ECHO MEAS - MV P1/2T MAX VEL: 88 CM/SEC
BH CV ECHO MEAS - MV P1/2T: 85.6 MSEC
BH CV ECHO MEAS - MVA P1/2T LCG: 2.5 CM^2
BH CV ECHO MEAS - MVA(P1/2T): 2.6 CM^2
BH CV ECHO MEAS - PA ACC SLOPE: 672.9 CM/SEC^2
BH CV ECHO MEAS - PA ACC TIME: 0.12 SEC
BH CV ECHO MEAS - PA PR(ACCEL): 25.5 MMHG
BH CV ECHO MEAS - RV MAX PG: 2.2 MMHG
BH CV ECHO MEAS - RV V1 MAX: 73.5 CM/SEC
BH CV ECHO MEAS - SI(CUBED): 19.5 ML/M^2
BH CV ECHO MEAS - SI(TEICH): 21.8 ML/M^2
BH CV ECHO MEAS - SV(CUBED): 30.3 ML
BH CV ECHO MEAS - SV(TEICH): 33.9 ML
BH CV ECHO MEAS - TAPSE (>1.6): 2.3 CM2
BH CV ECHO MEASUREMENTS AVERAGE E/E' RATIO: 14.93
BH CV XLRA - RV BASE: 2.7 CM
BH CV XLRA - RV LENGTH: 6.1 CM
BH CV XLRA - RV MID: 2.3 CM
BH CV XLRA - TDI S': 10.6 CM/SEC
BILIRUB SERPL-MCNC: 0.2 MG/DL (ref 0.3–1.2)
BUN BLD-MCNC: 16 MG/DL (ref 9–23)
BUN BLD-MCNC: 16 MG/DL (ref 9–23)
BUN/CREAT SERPL: 21.3 (ref 7–25)
BUN/CREAT SERPL: 25.8 (ref 7–25)
CALCIUM SPEC-SCNC: 8.9 MG/DL (ref 8.7–10.4)
CALCIUM SPEC-SCNC: 9.6 MG/DL (ref 8.7–10.4)
CHLORIDE SERPL-SCNC: 100 MMOL/L (ref 99–109)
CHLORIDE SERPL-SCNC: 98 MMOL/L (ref 99–109)
CHOLEST SERPL-MCNC: 152 MG/DL (ref 0–200)
CO2 SERPL-SCNC: 27 MMOL/L (ref 20–31)
CO2 SERPL-SCNC: 30 MMOL/L (ref 20–31)
CREAT BLD-MCNC: 0.62 MG/DL (ref 0.6–1.3)
CREAT BLD-MCNC: 0.75 MG/DL (ref 0.6–1.3)
DEPRECATED RDW RBC AUTO: 41 FL (ref 37–54)
DEPRECATED RDW RBC AUTO: 42.3 FL (ref 37–54)
EOSINOPHIL # BLD AUTO: 0.3 10*3/MM3 (ref 0–0.3)
EOSINOPHIL # BLD AUTO: 0.33 10*3/MM3 (ref 0–0.3)
EOSINOPHIL NFR BLD AUTO: 3.1 % (ref 0–3)
EOSINOPHIL NFR BLD AUTO: 3.5 % (ref 0–3)
ERYTHROCYTE [DISTWIDTH] IN BLOOD BY AUTOMATED COUNT: 12.4 % (ref 11.3–14.5)
ERYTHROCYTE [DISTWIDTH] IN BLOOD BY AUTOMATED COUNT: 12.6 % (ref 11.3–14.5)
GFR SERPL CREATININE-BSD FRML MDRD: 74 ML/MIN/1.73
GFR SERPL CREATININE-BSD FRML MDRD: 92 ML/MIN/1.73
GLOBULIN UR ELPH-MCNC: 2.6 GM/DL
GLUCOSE BLD-MCNC: 203 MG/DL (ref 70–100)
GLUCOSE BLD-MCNC: 270 MG/DL (ref 70–100)
GLUCOSE BLDC GLUCOMTR-MCNC: 132 MG/DL (ref 70–130)
GLUCOSE BLDC GLUCOMTR-MCNC: 235 MG/DL (ref 70–130)
GLUCOSE BLDC GLUCOMTR-MCNC: 254 MG/DL (ref 70–130)
GLUCOSE BLDC GLUCOMTR-MCNC: 279 MG/DL (ref 70–130)
HBA1C MFR BLD: 8.3 % (ref 4.8–5.6)
HCT VFR BLD AUTO: 34.8 % (ref 34.5–44)
HCT VFR BLD AUTO: 39.4 % (ref 34.5–44)
HDLC SERPL-MCNC: 37 MG/DL (ref 40–60)
HGB BLD-MCNC: 11.4 G/DL (ref 11.5–15.5)
HGB BLD-MCNC: 12.8 G/DL (ref 11.5–15.5)
IMM GRANULOCYTES # BLD: 0.03 10*3/MM3 (ref 0–0.03)
IMM GRANULOCYTES # BLD: 0.03 10*3/MM3 (ref 0–0.03)
IMM GRANULOCYTES NFR BLD: 0.3 % (ref 0–0.6)
IMM GRANULOCYTES NFR BLD: 0.3 % (ref 0–0.6)
LEFT ATRIUM VOLUME INDEX: 23.8 ML/M^2
LEFT ATRIUM VOLUME: 37 CM3
LV EF 2D ECHO EST: 60 %
LYMPHOCYTES # BLD AUTO: 2.3 10*3/MM3 (ref 0.6–4.8)
LYMPHOCYTES # BLD AUTO: 2.52 10*3/MM3 (ref 0.6–4.8)
LYMPHOCYTES NFR BLD AUTO: 24.6 % (ref 24–44)
LYMPHOCYTES NFR BLD AUTO: 26.2 % (ref 24–44)
MCH RBC QN AUTO: 29.5 PG (ref 27–31)
MCH RBC QN AUTO: 30.1 PG (ref 27–31)
MCHC RBC AUTO-ENTMCNC: 32.5 G/DL (ref 32–36)
MCHC RBC AUTO-ENTMCNC: 32.8 G/DL (ref 32–36)
MCV RBC AUTO: 90.2 FL (ref 80–99)
MCV RBC AUTO: 92.7 FL (ref 80–99)
MONOCYTES # BLD AUTO: 0.58 10*3/MM3 (ref 0–1)
MONOCYTES # BLD AUTO: 0.59 10*3/MM3 (ref 0–1)
MONOCYTES NFR BLD AUTO: 6.1 % (ref 0–12)
MONOCYTES NFR BLD AUTO: 6.2 % (ref 0–12)
NEUTROPHILS # BLD AUTO: 6.12 10*3/MM3 (ref 1.5–8.3)
NEUTROPHILS # BLD AUTO: 6.14 10*3/MM3 (ref 1.5–8.3)
NEUTROPHILS NFR BLD AUTO: 63.9 % (ref 41–71)
NEUTROPHILS NFR BLD AUTO: 65.4 % (ref 41–71)
PLATELET # BLD AUTO: 221 10*3/MM3 (ref 150–450)
PLATELET # BLD AUTO: 239 10*3/MM3 (ref 150–450)
PMV BLD AUTO: 11.2 FL (ref 6–12)
PMV BLD AUTO: 11.6 FL (ref 6–12)
POTASSIUM BLD-SCNC: 3.5 MMOL/L (ref 3.5–5.5)
POTASSIUM BLD-SCNC: 3.7 MMOL/L (ref 3.5–5.5)
PROT SERPL-MCNC: 6.9 G/DL (ref 5.7–8.2)
RBC # BLD AUTO: 3.86 10*6/MM3 (ref 3.89–5.14)
RBC # BLD AUTO: 4.25 10*6/MM3 (ref 3.89–5.14)
SODIUM BLD-SCNC: 136 MMOL/L (ref 132–146)
SODIUM BLD-SCNC: 136 MMOL/L (ref 132–146)
TRIGL SERPL-MCNC: 379 MG/DL (ref 0–150)
TROPONIN I SERPL-MCNC: 11.38 NG/ML
TROPONIN I SERPL-MCNC: 11.85 NG/ML
TROPONIN I SERPL-MCNC: 15.07 NG/ML
WBC NRBC COR # BLD: 9.36 10*3/MM3 (ref 3.5–10.8)
WBC NRBC COR # BLD: 9.62 10*3/MM3 (ref 3.5–10.8)

## 2018-11-10 PROCEDURE — 93005 ELECTROCARDIOGRAM TRACING: CPT | Performed by: NURSE PRACTITIONER

## 2018-11-10 PROCEDURE — 94799 UNLISTED PULMONARY SVC/PX: CPT

## 2018-11-10 PROCEDURE — 25010000002 ONDANSETRON PER 1 MG: Performed by: INTERNAL MEDICINE

## 2018-11-10 PROCEDURE — 94010 BREATHING CAPACITY TEST: CPT

## 2018-11-10 PROCEDURE — 84484 ASSAY OF TROPONIN QUANT: CPT | Performed by: INTERNAL MEDICINE

## 2018-11-10 PROCEDURE — 93010 ELECTROCARDIOGRAM REPORT: CPT | Performed by: INTERNAL MEDICINE

## 2018-11-10 PROCEDURE — 94010 BREATHING CAPACITY TEST: CPT | Performed by: INTERNAL MEDICINE

## 2018-11-10 PROCEDURE — 94640 AIRWAY INHALATION TREATMENT: CPT

## 2018-11-10 PROCEDURE — 93306 TTE W/DOPPLER COMPLETE: CPT

## 2018-11-10 PROCEDURE — C1769 GUIDE WIRE: HCPCS | Performed by: INTERNAL MEDICINE

## 2018-11-10 PROCEDURE — 84484 ASSAY OF TROPONIN QUANT: CPT | Performed by: NURSE PRACTITIONER

## 2018-11-10 PROCEDURE — 93458 L HRT ARTERY/VENTRICLE ANGIO: CPT | Performed by: INTERNAL MEDICINE

## 2018-11-10 PROCEDURE — 82962 GLUCOSE BLOOD TEST: CPT

## 2018-11-10 PROCEDURE — B2151ZZ FLUOROSCOPY OF LEFT HEART USING LOW OSMOLAR CONTRAST: ICD-10-PCS | Performed by: INTERNAL MEDICINE

## 2018-11-10 PROCEDURE — 93306 TTE W/DOPPLER COMPLETE: CPT | Performed by: INTERNAL MEDICINE

## 2018-11-10 PROCEDURE — 83036 HEMOGLOBIN GLYCOSYLATED A1C: CPT | Performed by: INTERNAL MEDICINE

## 2018-11-10 PROCEDURE — 99222 1ST HOSP IP/OBS MODERATE 55: CPT | Performed by: THORACIC SURGERY (CARDIOTHORACIC VASCULAR SURGERY)

## 2018-11-10 PROCEDURE — 25010000002 MIDAZOLAM PER 1 MG: Performed by: INTERNAL MEDICINE

## 2018-11-10 PROCEDURE — 99232 SBSQ HOSP IP/OBS MODERATE 35: CPT | Performed by: INTERNAL MEDICINE

## 2018-11-10 PROCEDURE — C1894 INTRO/SHEATH, NON-LASER: HCPCS | Performed by: INTERNAL MEDICINE

## 2018-11-10 PROCEDURE — 85025 COMPLETE CBC W/AUTO DIFF WBC: CPT | Performed by: NURSE PRACTITIONER

## 2018-11-10 PROCEDURE — 0 IOPAMIDOL PER 1 ML: Performed by: INTERNAL MEDICINE

## 2018-11-10 PROCEDURE — 80053 COMPREHEN METABOLIC PANEL: CPT | Performed by: NURSE PRACTITIONER

## 2018-11-10 PROCEDURE — 25010000002 FENTANYL CITRATE (PF) 100 MCG/2ML SOLUTION: Performed by: INTERNAL MEDICINE

## 2018-11-10 PROCEDURE — 99222 1ST HOSP IP/OBS MODERATE 55: CPT | Performed by: INTERNAL MEDICINE

## 2018-11-10 PROCEDURE — 63710000001 INSULIN DETEMIR PER 5 UNITS: Performed by: INTERNAL MEDICINE

## 2018-11-10 PROCEDURE — C1760 CLOSURE DEV, VASC: HCPCS | Performed by: INTERNAL MEDICINE

## 2018-11-10 PROCEDURE — 4A023N7 MEASUREMENT OF CARDIAC SAMPLING AND PRESSURE, LEFT HEART, PERCUTANEOUS APPROACH: ICD-10-PCS | Performed by: INTERNAL MEDICINE

## 2018-11-10 PROCEDURE — 99223 1ST HOSP IP/OBS HIGH 75: CPT | Performed by: INTERNAL MEDICINE

## 2018-11-10 PROCEDURE — B2111ZZ FLUOROSCOPY OF MULTIPLE CORONARY ARTERIES USING LOW OSMOLAR CONTRAST: ICD-10-PCS | Performed by: INTERNAL MEDICINE

## 2018-11-10 PROCEDURE — 25010000002 ENOXAPARIN PER 10 MG: Performed by: INTERNAL MEDICINE

## 2018-11-10 PROCEDURE — 80061 LIPID PANEL: CPT | Performed by: INTERNAL MEDICINE

## 2018-11-10 PROCEDURE — 63710000001 INSULIN LISPRO (HUMAN) PER 5 UNITS: Performed by: INTERNAL MEDICINE

## 2018-11-10 PROCEDURE — 85025 COMPLETE CBC W/AUTO DIFF WBC: CPT | Performed by: INTERNAL MEDICINE

## 2018-11-10 RX ORDER — GABAPENTIN 400 MG/1
400 CAPSULE ORAL 4 TIMES DAILY
Status: DISCONTINUED | OUTPATIENT
Start: 2018-11-10 | End: 2018-11-14 | Stop reason: HOSPADM

## 2018-11-10 RX ORDER — ONDANSETRON 2 MG/ML
4 INJECTION INTRAMUSCULAR; INTRAVENOUS EVERY 6 HOURS PRN
Status: DISCONTINUED | OUTPATIENT
Start: 2018-11-10 | End: 2018-11-14 | Stop reason: HOSPADM

## 2018-11-10 RX ORDER — SODIUM CHLORIDE 0.9 % (FLUSH) 0.9 %
3 SYRINGE (ML) INJECTION EVERY 12 HOURS SCHEDULED
Status: DISCONTINUED | OUTPATIENT
Start: 2018-11-10 | End: 2018-11-14 | Stop reason: HOSPADM

## 2018-11-10 RX ORDER — TIZANIDINE 4 MG/1
4 TABLET ORAL NIGHTLY
Status: DISCONTINUED | OUTPATIENT
Start: 2018-11-10 | End: 2018-11-14 | Stop reason: HOSPADM

## 2018-11-10 RX ORDER — LOSARTAN POTASSIUM 50 MG/1
100 TABLET ORAL DAILY
Status: DISCONTINUED | OUTPATIENT
Start: 2018-11-10 | End: 2018-11-14 | Stop reason: HOSPADM

## 2018-11-10 RX ORDER — CARBAMAZEPINE 200 MG/1
100 TABLET ORAL 2 TIMES DAILY
Status: DISCONTINUED | OUTPATIENT
Start: 2018-11-10 | End: 2018-11-14 | Stop reason: HOSPADM

## 2018-11-10 RX ORDER — FERROUS SULFATE 325(65) MG
325 TABLET ORAL
Status: DISCONTINUED | OUTPATIENT
Start: 2018-11-10 | End: 2018-11-10

## 2018-11-10 RX ORDER — CYCLOSPORINE 0.5 MG/ML
1 EMULSION OPHTHALMIC 2 TIMES DAILY
Status: DISCONTINUED | OUTPATIENT
Start: 2018-11-10 | End: 2018-11-14 | Stop reason: HOSPADM

## 2018-11-10 RX ORDER — MELATONIN
1000 DAILY
Status: DISCONTINUED | OUTPATIENT
Start: 2018-11-10 | End: 2018-11-14 | Stop reason: HOSPADM

## 2018-11-10 RX ORDER — CETIRIZINE HYDROCHLORIDE 10 MG/1
5 TABLET ORAL DAILY
Status: DISCONTINUED | OUTPATIENT
Start: 2018-11-10 | End: 2018-11-10

## 2018-11-10 RX ORDER — ONDANSETRON 4 MG/1
4 TABLET, FILM COATED ORAL EVERY 6 HOURS PRN
Status: DISCONTINUED | OUTPATIENT
Start: 2018-11-10 | End: 2018-11-14 | Stop reason: HOSPADM

## 2018-11-10 RX ORDER — AMLODIPINE BESYLATE 10 MG/1
10 TABLET ORAL DAILY
Status: DISCONTINUED | OUTPATIENT
Start: 2018-11-10 | End: 2018-11-14 | Stop reason: HOSPADM

## 2018-11-10 RX ORDER — DIAZEPAM 2 MG/1
2 TABLET ORAL NIGHTLY
Status: DISCONTINUED | OUTPATIENT
Start: 2018-11-10 | End: 2018-11-14 | Stop reason: HOSPADM

## 2018-11-10 RX ORDER — ALBUTEROL SULFATE 2.5 MG/3ML
2.5 SOLUTION RESPIRATORY (INHALATION) EVERY 4 HOURS PRN
Status: DISCONTINUED | OUTPATIENT
Start: 2018-11-10 | End: 2018-11-14 | Stop reason: HOSPADM

## 2018-11-10 RX ORDER — FENTANYL CITRATE 50 UG/ML
INJECTION, SOLUTION INTRAMUSCULAR; INTRAVENOUS AS NEEDED
Status: DISCONTINUED | OUTPATIENT
Start: 2018-11-10 | End: 2018-11-10 | Stop reason: HOSPADM

## 2018-11-10 RX ORDER — CLOPIDOGREL BISULFATE 75 MG/1
600 TABLET ORAL ONCE
Status: DISCONTINUED | OUTPATIENT
Start: 2018-11-10 | End: 2018-11-10 | Stop reason: SDUPTHER

## 2018-11-10 RX ORDER — SODIUM CHLORIDE 9 MG/ML
1-3 INJECTION, SOLUTION INTRAVENOUS CONTINUOUS
Status: DISCONTINUED | OUTPATIENT
Start: 2018-11-10 | End: 2018-11-14 | Stop reason: HOSPADM

## 2018-11-10 RX ORDER — CARBAMAZEPINE 200 MG/1
100 TABLET ORAL 2 TIMES DAILY
Status: DISCONTINUED | OUTPATIENT
Start: 2018-11-10 | End: 2018-11-10

## 2018-11-10 RX ORDER — FEXOFENADINE HCL 180 MG/1
180 TABLET ORAL EVERY 12 HOURS
Status: DISCONTINUED | OUTPATIENT
Start: 2018-11-10 | End: 2018-11-14 | Stop reason: HOSPADM

## 2018-11-10 RX ORDER — SODIUM CHLORIDE 0.9 % (FLUSH) 0.9 %
3-10 SYRINGE (ML) INJECTION AS NEEDED
Status: DISCONTINUED | OUTPATIENT
Start: 2018-11-10 | End: 2018-11-14 | Stop reason: HOSPADM

## 2018-11-10 RX ORDER — LIDOCAINE HYDROCHLORIDE 10 MG/ML
INJECTION, SOLUTION INFILTRATION; PERINEURAL AS NEEDED
Status: DISCONTINUED | OUTPATIENT
Start: 2018-11-10 | End: 2018-11-10 | Stop reason: HOSPADM

## 2018-11-10 RX ORDER — NITROGLYCERIN 20 MG/100ML
10-50 INJECTION INTRAVENOUS
Status: DISCONTINUED | OUTPATIENT
Start: 2018-11-10 | End: 2018-11-14 | Stop reason: HOSPADM

## 2018-11-10 RX ORDER — NICOTINE POLACRILEX 4 MG
15 LOZENGE BUCCAL
Status: DISCONTINUED | OUTPATIENT
Start: 2018-11-10 | End: 2018-11-14 | Stop reason: HOSPADM

## 2018-11-10 RX ORDER — ATORVASTATIN CALCIUM 20 MG/1
20 TABLET, FILM COATED ORAL DAILY
Status: DISCONTINUED | OUTPATIENT
Start: 2018-11-10 | End: 2018-11-12

## 2018-11-10 RX ORDER — ACETAMINOPHEN 325 MG/1
650 TABLET ORAL EVERY 4 HOURS PRN
Status: DISCONTINUED | OUTPATIENT
Start: 2018-11-10 | End: 2018-11-14 | Stop reason: HOSPADM

## 2018-11-10 RX ORDER — VITS A,C,E/LUTEIN/MINERALS 300MCG-200
1 TABLET ORAL DAILY
Status: DISCONTINUED | OUTPATIENT
Start: 2018-11-10 | End: 2018-11-14 | Stop reason: HOSPADM

## 2018-11-10 RX ORDER — MONTELUKAST SODIUM 10 MG/1
10 TABLET ORAL NIGHTLY
Status: DISCONTINUED | OUTPATIENT
Start: 2018-11-10 | End: 2018-11-14 | Stop reason: HOSPADM

## 2018-11-10 RX ORDER — CLOPIDOGREL BISULFATE 75 MG/1
600 TABLET ORAL ONCE
Status: COMPLETED | OUTPATIENT
Start: 2018-11-10 | End: 2018-11-10

## 2018-11-10 RX ORDER — LANOLIN ALCOHOL/MO/W.PET/CERES
1000 CREAM (GRAM) TOPICAL DAILY
Status: DISCONTINUED | OUTPATIENT
Start: 2018-11-10 | End: 2018-11-14 | Stop reason: HOSPADM

## 2018-11-10 RX ORDER — CELECOXIB 200 MG/1
200 CAPSULE ORAL DAILY
Status: DISCONTINUED | OUTPATIENT
Start: 2018-11-10 | End: 2018-11-10

## 2018-11-10 RX ORDER — DEXTROSE MONOHYDRATE 25 G/50ML
25 INJECTION, SOLUTION INTRAVENOUS
Status: DISCONTINUED | OUTPATIENT
Start: 2018-11-10 | End: 2018-11-14 | Stop reason: HOSPADM

## 2018-11-10 RX ORDER — MORPHINE SULFATE 30 MG/1
30 TABLET, FILM COATED, EXTENDED RELEASE ORAL EVERY 12 HOURS SCHEDULED
Status: DISCONTINUED | OUTPATIENT
Start: 2018-11-10 | End: 2018-11-13

## 2018-11-10 RX ORDER — TIZANIDINE 4 MG/1
4 TABLET ORAL DAILY
Status: DISCONTINUED | OUTPATIENT
Start: 2018-11-10 | End: 2018-11-10

## 2018-11-10 RX ORDER — NITROGLYCERIN 0.4 MG/1
0.4 TABLET SUBLINGUAL
Status: DISCONTINUED | OUTPATIENT
Start: 2018-11-10 | End: 2018-11-14 | Stop reason: HOSPADM

## 2018-11-10 RX ORDER — PANTOPRAZOLE SODIUM 40 MG/1
40 TABLET, DELAYED RELEASE ORAL
Status: DISCONTINUED | OUTPATIENT
Start: 2018-11-10 | End: 2018-11-14 | Stop reason: HOSPADM

## 2018-11-10 RX ORDER — FERROUS SULFATE 325(65) MG
650 TABLET ORAL
Status: DISCONTINUED | OUTPATIENT
Start: 2018-11-11 | End: 2018-11-14 | Stop reason: HOSPADM

## 2018-11-10 RX ORDER — MIDAZOLAM HYDROCHLORIDE 1 MG/ML
INJECTION INTRAMUSCULAR; INTRAVENOUS AS NEEDED
Status: DISCONTINUED | OUTPATIENT
Start: 2018-11-10 | End: 2018-11-10 | Stop reason: HOSPADM

## 2018-11-10 RX ORDER — SODIUM CHLORIDE 9 MG/ML
50 INJECTION, SOLUTION INTRAVENOUS ONCE
Status: COMPLETED | OUTPATIENT
Start: 2018-11-10 | End: 2018-11-10

## 2018-11-10 RX ORDER — BUDESONIDE 0.5 MG/2ML
0.5 INHALANT ORAL
Status: DISCONTINUED | OUTPATIENT
Start: 2018-11-10 | End: 2018-11-14 | Stop reason: HOSPADM

## 2018-11-10 RX ORDER — OXYBUTYNIN CHLORIDE 5 MG/1
5 TABLET, EXTENDED RELEASE ORAL DAILY
Status: DISCONTINUED | OUTPATIENT
Start: 2018-11-10 | End: 2018-11-14 | Stop reason: HOSPADM

## 2018-11-10 RX ORDER — CARVEDILOL 3.12 MG/1
3.12 TABLET ORAL EVERY 12 HOURS SCHEDULED
Status: DISCONTINUED | OUTPATIENT
Start: 2018-11-10 | End: 2018-11-14 | Stop reason: HOSPADM

## 2018-11-10 RX ORDER — ASPIRIN 81 MG/1
81 TABLET, CHEWABLE ORAL DAILY
Status: DISCONTINUED | OUTPATIENT
Start: 2018-11-10 | End: 2018-11-14 | Stop reason: HOSPADM

## 2018-11-10 RX ORDER — CHLORTHALIDONE 25 MG/1
25 TABLET ORAL DAILY
Status: DISCONTINUED | OUTPATIENT
Start: 2018-11-10 | End: 2018-11-14 | Stop reason: HOSPADM

## 2018-11-10 RX ORDER — ASCORBIC ACID 500 MG
1000 TABLET ORAL DAILY
Status: DISCONTINUED | OUTPATIENT
Start: 2018-11-10 | End: 2018-11-14 | Stop reason: HOSPADM

## 2018-11-10 RX ADMIN — INSULIN DETEMIR 10 UNITS: 100 INJECTION, SOLUTION SUBCUTANEOUS at 01:01

## 2018-11-10 RX ADMIN — OXYCODONE HYDROCHLORIDE AND ACETAMINOPHEN 1000 MG: 500 TABLET ORAL at 09:17

## 2018-11-10 RX ADMIN — CYCLOSPORINE 1 DROP: 0.5 EMULSION OPHTHALMIC at 20:40

## 2018-11-10 RX ADMIN — GABAPENTIN 400 MG: 400 CAPSULE ORAL at 06:49

## 2018-11-10 RX ADMIN — INSULIN DETEMIR 7 UNITS: 100 INJECTION, SOLUTION SUBCUTANEOUS at 20:29

## 2018-11-10 RX ADMIN — NITROGLYCERIN 10 MCG/MIN: 20 INJECTION INTRAVENOUS at 11:46

## 2018-11-10 RX ADMIN — ATORVASTATIN CALCIUM 20 MG: 20 TABLET, FILM COATED ORAL at 09:20

## 2018-11-10 RX ADMIN — ASPIRIN 81 MG 81 MG: 81 TABLET ORAL at 09:17

## 2018-11-10 RX ADMIN — ONDANSETRON 4 MG: 2 INJECTION INTRAMUSCULAR; INTRAVENOUS at 07:17

## 2018-11-10 RX ADMIN — CARVEDILOL 3.12 MG: 3.12 TABLET, FILM COATED ORAL at 00:57

## 2018-11-10 RX ADMIN — CYCLOSPORINE 1 DROP: 0.5 EMULSION OPHTHALMIC at 09:00

## 2018-11-10 RX ADMIN — BUDESONIDE 0.5 MG: 0.5 INHALANT RESPIRATORY (INHALATION) at 20:53

## 2018-11-10 RX ADMIN — INSULIN LISPRO 3 UNITS: 100 INJECTION, SOLUTION INTRAVENOUS; SUBCUTANEOUS at 17:48

## 2018-11-10 RX ADMIN — CHLORTHALIDONE 25 MG: 25 TABLET ORAL at 09:20

## 2018-11-10 RX ADMIN — VITAMIN D, TAB 1000IU (100/BT) 1000 UNITS: 25 TAB at 09:17

## 2018-11-10 RX ADMIN — MORPHINE SULFATE 30 MG: 30 TABLET, EXTENDED RELEASE ORAL at 20:31

## 2018-11-10 RX ADMIN — CARVEDILOL 3.12 MG: 3.12 TABLET, FILM COATED ORAL at 20:31

## 2018-11-10 RX ADMIN — CARVEDILOL 3.12 MG: 3.12 TABLET, FILM COATED ORAL at 09:19

## 2018-11-10 RX ADMIN — CYANOCOBALAMIN TAB 1000 MCG 1000 MCG: 1000 TAB at 09:18

## 2018-11-10 RX ADMIN — INSULIN LISPRO 4 UNITS: 100 INJECTION, SOLUTION INTRAVENOUS; SUBCUTANEOUS at 20:32

## 2018-11-10 RX ADMIN — CARBAMAZEPINE 100 MG: 200 TABLET ORAL at 09:17

## 2018-11-10 RX ADMIN — Medication 325 MG: at 09:19

## 2018-11-10 RX ADMIN — MORPHINE SULFATE 30 MG: 30 TABLET, EXTENDED RELEASE ORAL at 00:56

## 2018-11-10 RX ADMIN — PANTOPRAZOLE SODIUM 40 MG: 40 TABLET, DELAYED RELEASE ORAL at 06:49

## 2018-11-10 RX ADMIN — BUDESONIDE 0.5 MG: 0.5 INHALANT RESPIRATORY (INHALATION) at 09:15

## 2018-11-10 RX ADMIN — CARBAMAZEPINE 100 MG: 200 TABLET ORAL at 00:57

## 2018-11-10 RX ADMIN — CLOPIDOGREL BISULFATE 600 MG: 75 TABLET ORAL at 03:03

## 2018-11-10 RX ADMIN — CARBAMAZEPINE 100 MG: 200 TABLET ORAL at 20:31

## 2018-11-10 RX ADMIN — CYCLOSPORINE 1 DROP: 0.5 EMULSION OPHTHALMIC at 00:59

## 2018-11-10 RX ADMIN — LOSARTAN POTASSIUM 100 MG: 50 TABLET ORAL at 09:18

## 2018-11-10 RX ADMIN — INSULIN DETEMIR 10 UNITS: 100 INJECTION, SOLUTION SUBCUTANEOUS at 09:22

## 2018-11-10 RX ADMIN — GABAPENTIN 400 MG: 400 CAPSULE ORAL at 12:46

## 2018-11-10 RX ADMIN — ENOXAPARIN SODIUM 60 MG: 60 INJECTION SUBCUTANEOUS at 20:32

## 2018-11-10 RX ADMIN — FEXOFENADINE HCL 180 MG: 180 TABLET ORAL at 20:34

## 2018-11-10 RX ADMIN — ACETAMINOPHEN 650 MG: 325 TABLET ORAL at 06:49

## 2018-11-10 RX ADMIN — Medication 1 TABLET: at 09:18

## 2018-11-10 RX ADMIN — ALBUTEROL SULFATE 2.5 MG: 2.5 SOLUTION RESPIRATORY (INHALATION) at 20:53

## 2018-11-10 RX ADMIN — DIAZEPAM 2 MG: 2 TABLET ORAL at 00:57

## 2018-11-10 RX ADMIN — GABAPENTIN 400 MG: 400 CAPSULE ORAL at 17:48

## 2018-11-10 RX ADMIN — MORPHINE SULFATE 30 MG: 30 TABLET, EXTENDED RELEASE ORAL at 09:20

## 2018-11-10 RX ADMIN — DIAZEPAM 2 MG: 2 TABLET ORAL at 20:31

## 2018-11-10 RX ADMIN — Medication 3 ML: at 20:40

## 2018-11-10 RX ADMIN — OXYBUTYNIN CHLORIDE 5 MG: 5 TABLET, EXTENDED RELEASE ORAL at 09:18

## 2018-11-10 RX ADMIN — AMLODIPINE BESYLATE 10 MG: 10 TABLET ORAL at 09:19

## 2018-11-10 RX ADMIN — SODIUM CHLORIDE 50 ML/HR: 9 INJECTION, SOLUTION INTRAVENOUS at 00:56

## 2018-11-10 RX ADMIN — GABAPENTIN 400 MG: 400 CAPSULE ORAL at 00:56

## 2018-11-10 RX ADMIN — TIZANIDINE 4 MG: 4 TABLET ORAL at 20:31

## 2018-11-10 RX ADMIN — MONTELUKAST SODIUM 10 MG: 10 TABLET, FILM COATED ORAL at 20:31

## 2018-11-10 RX ADMIN — Medication 3 ML: at 01:02

## 2018-11-10 NOTE — CONSULTS
[facility] CARDIOLOGY AT [facility]   [address], [address]  [place], [place], [id number] [phone]  [email or web address]           INPATIENT CONSULTATION NOTE    Referring Physician: DO Patsy Romeo Josie G, Do  [address]  [address]  [place] [address]    Patient Care Team:  Patient Care Team:  Emerson Alvarez MD as PCP - General (Family Medicine)  Emesron Alvarez MD as PCP - Claims Attributed    Reason for consultation: NSTEMI         HPI:    Fani Bird is a 82 y.o. female.  History of Present Illness    The patient has a history of CAD s/p BMS 1/2014, TIA/CVA, hypertension, hyperlipidemia, GERD, vitamin D deficiency, diabetes mellitus, and rhabdomyolysis.  The patient presented to the [facility] as a transfer from [facility].  She presented to their ED with complaints of intermittent midsternal chest pain that had been ongoing for several days.  She states that this pain is similar to the pain she felt prior to undergoing bare metal stenting in 2014.  She notes that she had been pain free since that time.  Nitroglycerin relieved her pain and she is currently pain-free.  She also notes that she has had recent palpitations and nausea.  She denies any shortness of breath, diaphoresis, lower extremity edema, lightheadedness, syncope.    PFSH:  Patient Active Problem List   Diagnosis   • Chest pain   • Cerebrovascular disease   • Coronary artery disease   • Hypertension   • Dyslipidemia   • GERD (gastroesophageal reflux disease)   • Vitamin D deficiency   • Diabetes mellitus (CMS/[facility])   • Dyspnea   • NSTEMI (non-ST elevated myocardial infarction) (CMS/[facility])       No current facility-administered medications on file prior to encounter.      Current Outpatient Medications on File Prior to Encounter   Medication Sig Dispense Refill   • amLODIPine (NORVASC) 10 MG tablet Take 10 mg by mouth Daily.     • ascorbic acid (VITAMIN C) 1000 MG tablet Take 1 tablet by mouth Daily.      • aspirin 81 MG tablet Take  by mouth daily.     • atorvastatin (LIPITOR) 10 MG tablet Take 20 mg by mouth Daily.     • celecoxib (CELEBREX) 200 MG capsule Take  by mouth Daily.     • chlorthalidone (HYGROTON) 25 MG tablet Take 1 tablet by mouth Daily. 90 tablet 2   • Cholecalciferol (VITAMIN D PO) Take  by mouth daily.     • Cranberry 1000 MG capsule Take  by mouth Daily.     • diazepam (VALIUM) 2 MG tablet Take 2 mg by mouth Every Night.     • estropipate (OGEN) 1.5 MG tablet Take  by mouth daily.     • ferrous sulfate 325 (65 FE) MG tablet Take  by mouth Daily. 2 daily     • fexofenadine (ALLEGRA) 180 MG tablet Take 180 mg by mouth 2 (Two) Times a Day.     • fluticasone (FLOVENT HFA) 220 MCG/ACT inhaler daily.     • gabapentin (NEURONTIN) 400 MG capsule Take 400 mg by mouth 4 (Four) Times a Day.     • insulin aspart prot-insulin aspart (novoLOG 70/30) (70-30) 100 UNIT/ML injection Inject  under the skin into the appropriate area as directed 2 (Two) Times a Day With Meals. 34 in morning and 22 a night     • losartan (COZAAR) 100 MG tablet Take  by mouth daily.     • mometasone (NASONEX) 50 MCG/ACT nasal spray 2 sprays into each nostril Daily.     • montelukast (SINGULAIR) 10 MG tablet Take 10 mg by mouth Every Night.     • Morphine (MS CONTIN) 30 MG 12 hr tablet Take 30 mg by mouth 2 (Two) Times a Day.     • Morphine (MSIR) 15 MG tablet Take 15 mg by mouth Every 4 (Four) Hours As Needed for Severe Pain .     • Multiple Vitamins-Minerals (EYE VITAMINS PO) Take  by mouth Daily.     • nitrofurantoin, macrocrystal-monohydrate, (MACROBID) 100 MG capsule Take 50 mg by mouth Daily.     • nitroglycerin (NITROSTAT) 0.4 MG SL tablet Place 1 tablet under the tongue Every 5 (Five) Minutes As Needed for Chest Pain. 25 tablet 3   • O2 (OXYGEN) Inhale 2 L/min Every Night.     • Omega-3 Fatty Acids (FISH OIL PO) Take  by mouth daily.     • pantoprazole (PROTONIX) 40 MG EC tablet Take  by mouth 2 (two) times a day.     •  tiZANidine (ZANAFLEX) 4 MG tablet Take  by mouth Daily.     • tolterodine LA (DETROL LA) 4 MG 24 hr capsule Take 1 capsule by mouth daily.     • albuterol (PROVENTIL) (2.5 MG/3ML) 0.083% nebulizer solution Take 2.5 mg by nebulization Every 4 (Four) Hours As Needed.     • albuterol (VENTOLIN HFA) 108 (90 BASE) MCG/ACT inhaler 2 (Two) Times a Day.     • carBAMazepine (TEGRETOL) 200 MG tablet Take 100 mg by mouth 2 (Two) Times a Day.     • Cyanocobalamin (VITAMIN B-12 PO) Take 1 tablet by mouth 1 (one) time per week.     • cycloSPORINE (RESTASIS) 0.05 % ophthalmic emulsion Apply  to eye 2 (two) times a day.     • Multiple Vitamins-Minerals (ICAPS AREDS 2 PO) Take  by mouth Daily.       Allergies   Allergen Reactions   • Isosorbide    • Augmentin [Amoxicillin-Pot Clavulanate] Diarrhea and Rash       Social History     Socioeconomic History   • Marital status:      Spouse name: Not on file   • Number of children: Not on file   • Years of education: Not on file   • Highest education level: Not on file   Occupational History   • Occupation: retired   Tobacco Use   • Smoking status: Never Smoker   • Smokeless tobacco: Never Used   Substance and Sexual Activity   • Alcohol use: No   • Drug use: No   • Sexual activity: Defer   Social History Narrative    Caffeine: 3 servings per day    Patient lives with her urmila     Family History   Problem Relation Age of Onset   • Heart disease Mother    • Hypertension Mother    • Heart disease Father    • Hypertension Father    • Heart attack Father    • Diabetes Father    • Diabetes Brother    • Liver cancer Brother    • Hypertension Brother    • Stroke Maternal Grandmother    • Heart attack Maternal Grandmother    • Stroke Maternal Grandfather    • No Known Problems Paternal Grandmother    • No Known Problems Paternal Grandfather        Review of Systems:  Positive for chest pain, palpitations, nausea  Negative for shortness of breath, diaphoresis, lower extremity edema,  lightheadedness, and syncope.  All other systems reviewed are negative.         Objective:     Vital Sign Min/Max for last 24 hours  Temp  Min: 97.5 °F (36.4 °C)  Max: 98.7 °F (37.1 °C)   BP  Min: 124/66  Max: 173/81   Pulse  Min: 75  Max: 95   Resp  Min: 14  Max: 16   SpO2  Min: 94 %  Max: 97 %   No Data Recorded      Intake/Output Summary (Last 24 hours) at 11/10/2018 0805  Last data filed at 11/10/2018 0626  Gross per 24 hour   Intake --   Output 1200 ml   Net -1200 ml           Vitals:    11/10/18 0743   BP: 124/66   Pulse: 75   Resp: 16   Temp: 98.7 °F (37.1 °C)   SpO2:        CONSTITUTIONAL: Well-nourished. In no acute distress.   SKIN: Warm and dry. No rashes noted  HEENT: Head is normocephalic and atraumatic. Mucous membranes are pink and moist.   NECK: Supple without masses or thyromegaly.   LUNGS: Normal effort. Clear to auscultation bilaterally without wheezing, rhonchi, or rales noted.   CARDIOVASCULAR: The heart has a regular rate with a normal S1 and S2. There is no gallop, rub, or click appreciated.   PERIPHERAL VASCULAR: Carotid upstroke is 2+ bilaterally and with bruits. Radial pulses are 2+ bilaterally. Posterior tibial pulses are 2+ and symmetrical. There is no lower extremity edema.   ABDOMEN: Soft with no tenderness with palpitation. No hepatosplenomegaly  MUSCULOSKELETAL:  No digital cyanosis  NEUROLOGICAL: Nonfocal.  PSYCHIATRIC: Alert, orientated x 3, appropriate affect     Labs:  Lab Results   Component Value Date    CKTOTAL 78 09/07/2015    CKMB 1.2 09/07/2015    CKMBINDEX 2 09/07/2015    TROPONINI 15.071 (C) 11/10/2018       Glucose   Date Value Ref Range Status   11/10/2018 203 (H) 70 - 100 mg/dL Final     BUN   Date Value Ref Range Status   11/10/2018 16 9 - 23 mg/dL Final     Creatinine   Date Value Ref Range Status   11/10/2018 0.62 0.60 - 1.30 mg/dL Final     Sodium   Date Value Ref Range Status   11/10/2018 136 132 - 146 mmol/L Final     Potassium   Date Value Ref Range Status    11/10/2018 3.5 3.5 - 5.5 mmol/L Final     Chloride   Date Value Ref Range Status   11/10/2018 100 99 - 109 mmol/L Final     CO2   Date Value Ref Range Status   11/10/2018 27.0 20.0 - 31.0 mmol/L Final     Calcium   Date Value Ref Range Status   11/10/2018 8.9 8.7 - 10.4 mg/dL Final     Total Protein   Date Value Ref Range Status   11/10/2018 6.9 5.7 - 8.2 g/dL Final     Albumin   Date Value Ref Range Status   11/10/2018 4.31 3.20 - 4.80 g/dL Final     ALT (SGPT)   Date Value Ref Range Status   11/10/2018 30 7 - 40 U/L Final     AST (SGOT)   Date Value Ref Range Status   11/10/2018 52 (H) 0 - 33 U/L Final     Alkaline Phosphatase   Date Value Ref Range Status   11/10/2018 74 25 - 100 U/L Final     Total Bilirubin   Date Value Ref Range Status   11/10/2018 0.2 (L) 0.3 - 1.2 mg/dL Final     eGFR Non  Amer   Date Value Ref Range Status   11/10/2018 92 >60 mL/min/1.73 Final     BUN/Creatinine Ratio   Date Value Ref Range Status   11/10/2018 25.8 (H) 7.0 - 25.0 Final     Anion Gap   Date Value Ref Range Status   11/10/2018 9.0 3.0 - 11.0 mmol/L Final       Lab Results   Component Value Date    CHOL 152 11/10/2018     Lab Results   Component Value Date    TRIG 379 (H) 11/10/2018     Lab Results   Component Value Date    HDL 37 (L) 11/10/2018     No components found for: LDLCALC  No results found for: VLDL  No results found for: LDLHDL    WBC   Date Value Ref Range Status   11/10/2018 9.36 3.50 - 10.80 10*3/mm3 Final     RBC   Date Value Ref Range Status   11/10/2018 3.86 (L) 3.89 - 5.14 10*6/mm3 Final     Hemoglobin   Date Value Ref Range Status   11/10/2018 11.4 (L) 11.5 - 15.5 g/dL Final     Hematocrit   Date Value Ref Range Status   11/10/2018 34.8 34.5 - 44.0 % Final     MCV   Date Value Ref Range Status   11/10/2018 90.2 80.0 - 99.0 fL Final     MCH   Date Value Ref Range Status   11/10/2018 29.5 27.0 - 31.0 pg Final     MCHC   Date Value Ref Range Status   11/10/2018 32.8 32.0 - 36.0 g/dL Final     RDW    Date Value Ref Range Status   11/10/2018 12.6 11.3 - 14.5 % Final     RDW-SD   Date Value Ref Range Status   11/10/2018 41.0 37.0 - 54.0 fl Final     MPV   Date Value Ref Range Status   11/10/2018 11.2 6.0 - 12.0 fL Final     Platelets   Date Value Ref Range Status   11/10/2018 221 150 - 450 10*3/mm3 Final     Neutrophil %   Date Value Ref Range Status   11/10/2018 65.4 41.0 - 71.0 % Final     Lymphocyte %   Date Value Ref Range Status   11/10/2018 24.6 24.0 - 44.0 % Final     Monocyte %   Date Value Ref Range Status   11/10/2018 6.2 0.0 - 12.0 % Final     Eosinophil %   Date Value Ref Range Status   11/10/2018 3.5 (H) 0.0 - 3.0 % Final     Basophil %   Date Value Ref Range Status   11/10/2018 0.3 0.0 - 1.0 % Final     Immature Grans %   Date Value Ref Range Status   11/10/2018 0.3 0.0 - 0.6 % Final     Neutrophils, Absolute   Date Value Ref Range Status   11/10/2018 6.12 1.50 - 8.30 10*3/mm3 Final     Lymphocytes, Absolute   Date Value Ref Range Status   11/10/2018 2.30 0.60 - 4.80 10*3/mm3 Final     Monocytes, Absolute   Date Value Ref Range Status   11/10/2018 0.58 0.00 - 1.00 10*3/mm3 Final     Eosinophils, Absolute   Date Value Ref Range Status   11/10/2018 0.33 (H) 0.00 - 0.30 10*3/mm3 Final     Basophils, Absolute   Date Value Ref Range Status   11/10/2018 0.03 0.00 - 0.20 10*3/mm3 Final     Immature Grans, Absolute   Date Value Ref Range Status   11/10/2018 0.03 0.00 - 0.03 10*3/mm3 Final     Troponin at OSH 1.15 ---> 3.69 --> 15    Diagnostic Data:    EKG:  Normal sinus rhythm, nonspecific ST and T wave abnormality, left axis deviation    Tele:  NSR    Carotid Duplex 10/2017  · No obstructive carotid stenosis bilaterally  · Antegrade bilateral vertebral flow.    [facility] 1/2014  1.   Mild regional wall motion abnormality with normal left        ventricular systolic function.   2.   Successful Integrity bare-metal stent deployment in        the ostium of the left circumflex coronary artery for         single-vessel coronary artery disease.        Assessment and Plan:   ASSESSMENT:  -NSTEMI, troponin 11.854 on arrival, now 15.071. Currently pain free with nitroglycerin. Last ischemic evaluation 1/2014 with placement of bare metal stent.  -CAD, s/p BMS 1/2014, ASA and statin at home. Follows with Dr. Whitley as an outpatient.   -Hypertension, on multiple antihypertensives as an outpatient.   -Hyperlipidemia, atorvastatin, LDL 86   -Diabtetes Mellitus    PLAN:  -After review of the patient's clinical situation and discussion with her, the decision was made to perform a LHC +/- CBI via the right femoral approach. The risks, benefits, and alternatives of the procedure have been reviewed and the patient wishes to proceed.   -TTE  -Keep NPO.    KERI Joe obtained past medical, family history, social history, review of systems and functioned as a scribe for the remainder of the dictation for Dr. Mercedes.     Electronically signed by KERI Beyer, 11/10/18, 8:54 AM.    11/10/2018    I, Bethel Mercedes MD, personally performed the services as scribed by the above named individual. I have made any necessary edits and it is both accurate and complete.     Bethel Mercedes MD, MSc, [facility]  Interventional Cardiology  [facility] at [facility]

## 2018-11-10 NOTE — PROGRESS NOTES
[facility] Medicine Services  PROGRESS NOTE    Patient Name: Fani Bird  : 1935  MRN: [id number]    Date of Admission: 2018  Length of Stay: 0  Primary Care Physician: Emerson Alvarez MD    Subjective   Subjective     CC:  DMII    HPI:  Denies chest pain or dyspnea. Some anxiety over prospect of possible surgery.    Review of Systems  Gen- No fevers, chills  CV- No chest pain, palpitations  Resp- No cough, dyspnea  GI- No N/V/D, abd pain    Otherwise ROS is negative except as mentioned in the HPI.    Objective   Objective     Vital Signs:   Temp:  [97.5 °F (36.4 °C)-98.7 °F (37.1 °C)] 98.6 °F (37 °C)  Heart Rate:  [69-95] 72  Resp:  [14-18] 16  BP: (118-173)/() 118/50        Physical Exam:  Constitutional -no acute distress, non toxic, in bed  HEENT-NCAT, mucous membranes moist  CV-RRR, S1 S2 normal, no m/r/g  Resp-CTAB, no wheezes, rhonchi or rales  Abd-soft, non-tender, non-distended, normo active bowel sounds  Ext-No lower extremity cyanosis, clubbing or edema bilaterally  Neuro-alert and oriented, speech clear, moves all extremities   Psych-normal affect   Skin- No rash on exposed UE or LE bilaterally      Results Reviewed:  I have personally reviewed current lab, radiology, and data and agree.    Results from last 7 days   Lab Units  11/10/18   0546  11/10/18   0019   WBC 10*3/mm3  9.36  9.62   HEMOGLOBIN g/dL  11.4*  12.8   HEMATOCRIT %  34.8  39.4   PLATELETS 10*3/mm3  221  239     Results from last 7 days   Lab Units  11/10/18   1152  11/10/18   0546  11/10/18   0019   SODIUM mmol/L   --   136  136   POTASSIUM mmol/L   --   3.5  3.7   CHLORIDE mmol/L   --   100  98*   CO2 mmol/L   --   27.0  30.0   BUN mg/dL   --   16  16   CREATININE mg/dL   --   0.62  0.75   GLUCOSE mg/dL   --   203*  270*   CALCIUM mg/dL   --   8.9  9.6   ALT (SGPT) U/L   --    --   30   AST (SGOT) U/L   --    --   52*   TROPONIN I ng/mL  11.376*  15.071*  11.854*     Estimated  Creatinine Clearance: 45 mL/min (by C-G formula based on SCr of 0.62 mg/dL).  No results found for: BNP    Microbiology Results Abnormal     None          Imaging Results (last 24 hours)     ** No results found for the last 24 hours. **             I have reviewed the medications.      amLODIPine 10 mg Oral Daily   aspirin 81 mg Oral Daily   atorvastatin 20 mg Oral Daily   budesonide 0.5 mg Nebulization BID - RT   carBAMazepine 100 mg Oral BID   carvedilol 3.125 mg Oral Q12H   cetirizine 5 mg Oral Daily   chlorthalidone 25 mg Oral Daily   cholecalciferol 1,000 Units Oral Daily   cycloSPORINE 1 drop Both Eyes BID   diazePAM 2 mg Oral Nightly   enoxaparin 1 mg/kg Subcutaneous Q12H   [START ON 11/11/2018] ferrous sulfate 650 mg Oral Daily With Breakfast   gabapentin 400 mg Oral 4x Daily   insulin detemir 7 Units Subcutaneous Q12H   insulin lispro 0-7 Units Subcutaneous 4x Daily With Meals & Nightly   losartan 100 mg Oral Daily   montelukast 10 mg Oral Nightly   Morphine 30 mg Oral Q12H   OCUVITE-LUTEIN 1 tablet Oral Daily   oxybutynin XL 5 mg Oral Daily   pantoprazole 40 mg Oral Q AM   sodium chloride 3 mL Intravenous Q12H   tiZANidine 4 mg Oral Nightly   vitamin B-12 1,000 mcg Oral Daily   ascorbic acid 1,000 mg Oral Daily         Assessment/Plan   Assessment / Plan     Active Hospital Problems    Diagnosis   • **NSTEMI (non-ST elevated myocardial infarction) (CMS/[facility])   • Hypertension     uncontrolled. I will add chlorthalidone 25 to her current regimen     • GERD (gastroesophageal reflux disease)   • Dyslipidemia     May 2012 - Total 156, triglycerides 297, HDL 47, and LDL 54; on statin therapy      • Diabetes mellitus (CMS/[facility])     Hemoglobin A1c of 8.3.     • Coronary artery disease     post percutaneous coronary intervention and stenting with low normal left ventricular ejection fraction     • Cerebrovascular disease     a. TIA/CVA, September 2013.  b. Carotid CTA, September 2013:  60% to 70% left carotid bulb  stenosis, severe right vertebral stenosis.     • Chest pain     c. On 01/10/2014:  NSTEMI with 3.0 x 15 Integrity BMS to ostium of circumflex per MGR.  Normal LVEF.  d. September 2015, admission to [facility] with ACS with negative enzymes and negative EKG.             Brief Hospital Course to date:  Fani Bird is a 82 y.o. female with h/o CAD and DMII presents after three days of chest pressure.       Assessment & Plan:    DMII  - a1c 8.3  - continue basal bolus but will lower levemir dose this evening. Check glucose qac, hs, mn, 3am. Bmp am    NSTEMI  - LHC shows multivessel disease  - consideration of bypass grafting    Dyslipidemia  - LDL 86  -     HTN      DVT Prophylaxis:  lovenox    CODE STATUS:   Code Status and Medical Interventions:   Ordered at: 11/10/18 1124     Level Of Support Discussed With:    Patient     Code Status:    CPR     Medical Interventions (Level of Support Prior to Arrest):    Full       Disposition: I expect the patient to be discharged home in ? days.      Electronically signed by Júnior Bishop MD, 11/10/18, 4:06 PM.

## 2018-11-10 NOTE — H&P
[facility]  HISTORY AND PHYSICAL    Patient Name: Fani Bird  : 1935  MRN: [id number]  Primary Care Physician: Emerson Alvarez MD  Date of admission: 2018      Subjective   Subjective     Chief Complaint:  Chest pain    HPI:  Fani Bird is a 82 y.o. female with a past medical history significant for coronary artery disease status post stent in , hypertension, hyperlipidemia, diabetes mellitus who presents as a transfer from [facility] due to non-STEMI.  Patient reports a 3 day history of intermittent midsternal chest pressure.  She reports that her symptoms have been occurring about 2 hours after eating and she thought her chest pressure may be related to acid reflux.  Today, the patient had recurrence of midsternal chest pain 3 hours after breakfast, more severe.  She presented to an outside ED for further evaluation.  She reports complete resolution of pain after receiving Nitropaste.  She denies associated shortness of breath, diaphoresis, nausea, vomiting, palpitations, presyncope.    Review of Systems     Otherwise 10-system ROS reviewed and is negative except as mentioned in the HPI.    Personal History     Past Medical History:   Diagnosis Date   • Cataract    • Cerebrovascular disease    • Chest pain    • Coronary artery disease     no recent ischemic evaluation   • Diabetes mellitus (CMS/[facility])     Hemoglobin A1c of 8.3.   • Dyslipidemia     May 2012 - Total 156, triglycerides 297, HDL 47, and LDL 54.   • Dyspnea    • GERD (gastroesophageal reflux disease)    • Hypertension    • Pneumonia    • Swelling of lower extremity     improved with stockings   • Vitamin D deficiency        Past Surgical History:   Procedure Laterality Date   • BLADDER REPAIR     • BREAST BIOPSY     • BREAST CYST ASPIRATION     • CARDIAC CATHETERIZATION     • CARPAL TUNNEL RELEASE Bilateral    • CATARACT EXTRACTION     • CHOLECYSTECTOMY     •  CORONARY STENT PLACEMENT     • HYSTERECTOMY     • SPINAL FUSION         Family History: family history includes Diabetes in her brother and father; Heart attack in her father and maternal grandmother; Heart disease in her father and mother; Hypertension in her brother, father, and mother; Liver cancer in her brother; No Known Problems in her paternal grandfather and paternal grandmother; Stroke in her maternal grandfather and maternal grandmother.     Social History:  reports that she has never smoked. She has never used smokeless tobacco. She reports that she does not drink alcohol or use drugs.  Social History     Social History Narrative    Caffeine: 3 servings per day    Patient lives with her urmila       Medications:  Available home medication information reviewed     Allergies   Allergen Reactions   • Isosorbide    • Augmentin [Amoxicillin-Pot Clavulanate] Diarrhea and Rash       Objective   Objective     Vital Signs:   Heart Rate:  [94-95] 94  Resp:  [14] 14  BP: (149-173)/(73-81) 149/73        Physical Exam   Constitutional: No acute distress, awake, alert  Eyes: PERRLA, sclerae anicteric, no conjunctival injection  HENT: NCAT, mucous membranes moist  Neck: Supple, no thyromegaly, no lymphadenopathy, trachea midline  Respiratory: Clear to auscultation bilaterally, nonlabored respirations   Cardiovascular: RRR, no murmurs, rubs, or gallops, palpable pedal pulses bilaterally  Gastrointestinal: Positive bowel sounds, soft, nontender, nondistended  Musculoskeletal: No bilateral ankle edema, no clubbing or cyanosis to extremities  Psychiatric: Appropriate affect, cooperative  Neurologic: Oriented x 3, strength symmetric in all extremities, Cranial Nerves grossly intact to confrontation, speech clear  Skin: No rashes      Results Reviewed:  I have personally reviewed current lab, radiology, and data and agree.  Laboratory data from outside hospital  WBC 11.3, hemoglobin 13.6, hematocrit 40.4, platelet count  236  INR 0.9  UA: 1+ protein, 2+ glucose  Sodium 133, potassium 4.0, chloride 95, bicarbonate 28, BUN 17, creatinine 0.54, glucose 237, calcium 9.3, total protein 8.4, albumin 3.9, total bilirubin 0.2, AST 35, ALT 30, alkaline phosphatase 70  Initial troponin 1.15, second troponin 3.69, CK-MB 7.5 elevated,  elevated, myoglobin 220 elevated    EKG with left axis deviation, LVH            Invalid input(s):  ALKPHOS, TROPONININT  CrCl cannot be calculated (Patient's most recent lab result is older than the maximum 30 days allowed.).  Brief Urine Lab Results     None        No results found for: BNP  Imaging Results (last 24 hours)     ** No results found for the last 24 hours. **             Assessment/Plan   Assessment / Plan     Active Hospital Problems    Diagnosis   • **NSTEMI (non-ST elevated myocardial infarction) (CMS/[facility])   • Hypertension     uncontrolled. I will add chlorthalidone 25 to her current regimen     • GERD (gastroesophageal reflux disease)   • Dyslipidemia     May 2012 - Total 156, triglycerides 297, HDL 47, and LDL 54; on statin therapy      • Diabetes mellitus (CMS/[facility])     Hemoglobin A1c of 8.3.     • Coronary artery disease     post percutaneous coronary intervention and stenting with low normal left ventricular ejection fraction     • Cerebrovascular disease     a. TIA/CVA, September 2013.  b. Carotid CTA, September 2013:  60% to 70% left carotid bulb stenosis, severe right vertebral stenosis.     • Chest pain     c. On 01/10/2014:  NSTEMI with 3.0 x 15 Integrity BMS to ostium of circumflex per MGR.  Normal LVEF.  d. September 2015, admission to [facility] with ACS with negative enzymes and negative EKG.        This is an 82-year-old female patient with a past medical history significant for coronary artery disease status post stent, diabetes mellitus, hypertension who presents as a transfer from an outside facility with chest pain and found to have a non-STEMI.    Assessment  & Plan:  Non-STEMI  --Patient given Lovenox (at 1955) and Nitropaste at outside hospital with resolution of chest pain.  Defer to cardiology regarding continuing Lovenox  --Monitor on telemetry  --Nothing by mouth after midnight, cardiology consult  --Atorvastatin, aspirin, Coreg, losartan  --Fasting lipid panel, hemoglobin A1c in a.m.  --Continue to trend troponin, trending upward at OSH  --EKG in a.m.  --Defer to cardiology regarding ordering echocardiogram  --Gentle IVFs    Diabetes mellitus  --Reduced dose of long-acting insulin  --Sliding scale insulin with Accu-Cheks    Hypertension/hyperlipidemia  --Atorvastatin, Coreg, losartan chlorthalidone    GERD  --Continue with home Protonix    DVT prophylaxis:  Lovenox SCDs  CODE STATUS:    Code Status and Medical Interventions:   Ordered at: 11/10/18 0022     Level Of Support Discussed With:    Patient     Code Status:    CPR     Medical Interventions (Level of Support Prior to Arrest):    Full       Admission Status:  I believe this patient meets INPATIENT status due to the need for care which can only be reasonably provided in an hospital setting such as aggressive/expedited ancillary services and/or consultation services, the necessity for IV medications, close physician monitoring and/or the possible need for procedures.  In such, I feel patient’s risk for adverse outcomes and need for care warrant INPATIENT evaluation and predict the patient’s care encounter to likely last beyond 2 midnights.      Electronically signed by Katarzyna Garner DO, 11/10/18, 12:18 AM.

## 2018-11-10 NOTE — CONSULTS
CTS Consult  Fani Bird  [id number]  1935    Patient Care Team:  Emerson Alvarez MD as PCP - General (Family Medicine)  Emerson Alvarez MD as PCP - Claims Attributed    CC: My chest pain is gone      Reason for Consult:  LMCA stenosis    HPI: 82-year-old  female with a significant past history of coronary artery disease, carotid and cerebral vascular disease, hypertension, dyslipidemia, type 2 diabetes, and previous cerebrovascular accident.  The patient underwent coronary artery stenting 5 years ago and has done well until the past 2-3 weeks.  She is recently noted intermittent episodes of substernal chest pain and chest pressure associated with shortness of breath.  She has not had nausea or vomiting she has not had syncope she has not had diaphoresis.  In this setting she states that her pain is similar to what she had 5 years ago.  She presented to the emergency room at [facility] and was transferred to [facility] for further evaluation and for cardiac catheterization.  She was seen by Dr. Mercedes felt that catheterization was indicated.  I have reviewed the cardiac catheter films and data.  This patient has a tight left main coronary artery stenosis with an associated ostial stenosis of the circumflex coronary artery.  The right coronary artery which is dominant has nonflow restrictive disease.  The patient's ejection fraction is normal.      NSTEMI (non-ST elevated myocardial infarction) (CMS/[facility])    Chest pain    Cerebrovascular disease    Coronary artery disease    Hypertension    Dyslipidemia    GERD (gastroesophageal reflux disease)    Diabetes mellitus (CMS/[facility])      Review of Systems:  General: No anorexia, no weight loss, general malaise and weakness.  No fever chills or night sweats.  HEENT: No headaches no visual changes no hearing loss no rhinitis no pharyngitis  Pulmonary:(+) shortness of breath, no cough, no hemoptysis  Heart: No palpitations,   (+) malaise, (+) shortness of breath, no atrial fibrillation, no bradycardia, no syncope.  (+) anginal quality chest pain.  Gastrointestinal: No nausea, vomiting, diarrhea, or constipation. No acholic stool, no jaundice.  Renal: No dysuria, no frequency, no hematuria.  Skin: No rash, no skin lesions, no skin tumors.  Neurologic: No seizures, no muscle weakness, no sensory deficit, no amaurosis,  Psychiatric: No anxiety, no history of psychosis  Hematologic: No bleeding history, no ease of bruising, no history of blood disorder.  All other systems were reviewed and are negative.    History  Past Medical History:   Diagnosis Date   • Cataract    • Cerebrovascular disease    • Chest pain    • Coronary artery disease     no recent ischemic evaluation   • Diabetes mellitus (CMS/[facility])     Hemoglobin A1c of 8.3.   • Dyslipidemia     May 2012 - Total 156, triglycerides 297, HDL 47, and LDL 54.   • Dyspnea    • GERD (gastroesophageal reflux disease)    • Hypertension    • Pneumonia    • Swelling of lower extremity     improved with stockings   • Vitamin D deficiency      Past Surgical History:   Procedure Laterality Date   • BLADDER REPAIR     • BREAST BIOPSY     • BREAST CYST ASPIRATION     • CARDIAC CATHETERIZATION     • CARPAL TUNNEL RELEASE Bilateral    • CATARACT EXTRACTION     • CHOLECYSTECTOMY     • CORONARY STENT PLACEMENT     • HYSTERECTOMY     • SPINAL FUSION       Family History   Problem Relation Age of Onset   • Heart disease Mother    • Hypertension Mother    • Heart disease Father    • Hypertension Father    • Heart attack Father    • Diabetes Father    • Diabetes Brother    • Liver cancer Brother    • Hypertension Brother    • Stroke Maternal Grandmother    • Heart attack Maternal Grandmother    • Stroke Maternal Grandfather    • No Known Problems Paternal Grandmother    • No Known Problems Paternal Grandfather      Social History     Tobacco Use   • Smoking status: Never Smoker   • Smokeless tobacco: Never  Used   Substance Use Topics   • Alcohol use: No   • Drug use: No     Medications Prior to Admission   Medication Sig Dispense Refill Last Dose   • amLODIPine (NORVASC) 10 MG tablet Take 10 mg by mouth Daily.   Past Week at Unknown time   • ascorbic acid (VITAMIN C) 1000 MG tablet Take 1 tablet by mouth Daily.   Past Week at Unknown time   • aspirin 81 MG tablet Take  by mouth daily.   Past Week at Unknown time   • atorvastatin (LIPITOR) 10 MG tablet Take 20 mg by mouth Daily.   Past Week at Unknown time   • celecoxib (CELEBREX) 200 MG capsule Take  by mouth Daily.   Past Week at Unknown time   • chlorthalidone (HYGROTON) 25 MG tablet Take 1 tablet by mouth Daily. 90 tablet 2 Past Week at Unknown time   • Cholecalciferol (VITAMIN D PO) Take  by mouth daily.   Past Week at Unknown time   • Cranberry 1000 MG capsule Take  by mouth Daily.   Past Week at Unknown time   • diazepam (VALIUM) 2 MG tablet Take 2 mg by mouth Every Night.   Past Week at Unknown time   • estropipate (OGEN) 1.5 MG tablet Take  by mouth daily.   Past Week at Unknown time   • ferrous sulfate 325 (65 FE) MG tablet Take  by mouth Daily. 2 daily   Past Week at Unknown time   • fexofenadine (ALLEGRA) 180 MG tablet Take 180 mg by mouth 2 (Two) Times a Day.   Past Week at Unknown time   • fluticasone (FLOVENT HFA) 220 MCG/ACT inhaler daily.   Past Week at Unknown time   • gabapentin (NEURONTIN) 400 MG capsule Take 400 mg by mouth 4 (Four) Times a Day.   Past Week at Unknown time   • insulin aspart prot-insulin aspart (novoLOG 70/30) (70-30) 100 UNIT/ML injection Inject  under the skin into the appropriate area as directed 2 (Two) Times a Day With Meals. 34 in morning and 22 a night   Past Week at Unknown time   • losartan (COZAAR) 100 MG tablet Take  by mouth daily.   Past Week at Unknown time   • mometasone (NASONEX) 50 MCG/ACT nasal spray 2 sprays into each nostril Daily.   Past Week at Unknown time   • montelukast (SINGULAIR) 10 MG tablet Take 10 mg  by mouth Every Night.   Past Week at Unknown time   • Morphine (MS CONTIN) 30 MG 12 hr tablet Take 30 mg by mouth 2 (Two) Times a Day.   Past Week at Unknown time   • Morphine (MSIR) 15 MG tablet Take 15 mg by mouth Every 4 (Four) Hours As Needed for Severe Pain .   Past Week at Unknown time   • Multiple Vitamins-Minerals (EYE VITAMINS PO) Take  by mouth Daily.   Taking   • nitrofurantoin, macrocrystal-monohydrate, (MACROBID) 100 MG capsule Take 50 mg by mouth Daily.   Past Week at Unknown time   • nitroglycerin (NITROSTAT) 0.4 MG SL tablet Place 1 tablet under the tongue Every 5 (Five) Minutes As Needed for Chest Pain. 25 tablet 3 Past Week at Unknown time   • O2 (OXYGEN) Inhale 2 L/min Every Night.   Past Week at Unknown time   • Omega-3 Fatty Acids (FISH OIL PO) Take  by mouth daily.   Past Week at Unknown time   • pantoprazole (PROTONIX) 40 MG EC tablet Take  by mouth 2 (two) times a day.   Past Week at Unknown time   • tiZANidine (ZANAFLEX) 4 MG tablet Take  by mouth Daily.   Past Week at Unknown time   • tolterodine LA (DETROL LA) 4 MG 24 hr capsule Take 1 capsule by mouth daily.   Past Week at Unknown time   • albuterol (PROVENTIL) (2.5 MG/3ML) 0.083% nebulizer solution Take 2.5 mg by nebulization Every 4 (Four) Hours As Needed.   Taking   • albuterol (VENTOLIN HFA) 108 (90 BASE) MCG/ACT inhaler 2 (Two) Times a Day.   Taking   • carBAMazepine (TEGRETOL) 200 MG tablet Take 100 mg by mouth 2 (Two) Times a Day.   Taking   • Cyanocobalamin (VITAMIN B-12 PO) Take 1 tablet by mouth 1 (one) time per week.   Taking   • cycloSPORINE (RESTASIS) 0.05 % ophthalmic emulsion Apply  to eye 2 (two) times a day.   Taking   • Multiple Vitamins-Minerals (ICAPS AREDS 2 PO) Take  by mouth Daily.   Taking     Allergies:  Isosorbide and Augmentin [amoxicillin-pot clavulanate]    Objective    Vital Signs  Temp:  [97.5 °F (36.4 °C)-98.7 °F (37.1 °C)] 98.6 °F (37 °C)  Heart Rate:  [69-95] 69  Resp:  [14-18] 16  BP: (124-173)/()  146/96    Physical Exam:  General Appearance: alert, appears stated age and cooperative  Head: normocephalic, without obvious abnormality and atraumatic  Ears/Nose: no rhinitis, external ears normal  Throat:  no oral lesions, no pharyngitis  Neck: no adenopathy, suppple, trachea midline, no thyromegaly, no carotid bruit and no JVD  Lungs: clear to auscultation, respirations regular, respirations even and respirations unlabored  Heart: regular rhythm & normal rate, normal S1, S2, no murmur  Abdomen: normal bowel sounds, no masses, soft, non-tender  Extremities: moves extremities well and no edema  Pulses: pulses palpable and equal bilaterally (femoral, DP, PT)  Skin: no bleeding, bruising or rash  Neurologic: mental status orientated to person, place, time and situation, CN intact, no motor or sensory loss          Data Review:  Results from last 7 days   Lab Units  11/10/18   0546   WBC 10*3/mm3  9.36   HEMOGLOBIN g/dL  11.4*   HEMATOCRIT %  34.8   PLATELETS 10*3/mm3  221     Results from last 7 days   Lab Units  11/10/18   0546   SODIUM mmol/L  136   POTASSIUM mmol/L  3.5   CHLORIDE mmol/L  100   CO2 mmol/L  27.0   BUN mg/dL  16   CREATININE mg/dL  0.62   GLUCOSE mg/dL  203*   CALCIUM mg/dL  8.9              Imaging:Cardiac Catheterization ( results as noted in PI).        Assessment: Unstable angina with LMCA stenosis.  Past history of CVA with current limited mobility.       Plan:  Schedule CABGx2 Tuesday/ Wednesday depending on P2Y12.    I have reviewed, verified, and confirmed the above history and current status.  I have examined the patient and confirmed the above physical findings.Above plan and treatment regimen discussed in detail with patient.  Options of treatment, attendant risks vs benefits, and my recommendations were discussed and all questions answered.      Srinivasa Delacruz MD  11/10/18  1:23 PM

## 2018-11-10 NOTE — NURSING NOTE
ACC REVIEW REPORT: [facility]        PATIENT NAME: Fani Bird    PATIENT ID: [id number]    BED: N615    BED TYPE: TELE    BED GIVEN TO: BALWINDER WALSH RN    TIME BED GIVEN: 2132    YOB: 1935    AGE: 82    GENDER: FEMALE    PREVIOUS ADMIT TO [facility]:     PREVIOUS ADMISSION DATE:     PATIENT CLASS:     TODAY'S DATE: 11/9/2018    TRANSFER DATE: 11/9/18    ETA: WILL CALL WHEN PT LEAVES OS    TRANSFERRING FACILITY: [facility]    TRANSFERRING FACILITY PHONE # : [phone]    TRANSFERRING MD:     DATE/TIME REQUEST RECEIVED: 11/9/18 @1946    [facility] RN: IZABELLA MEYER RN    REPORT FROM: BALWINDER WALSH RN    TIME REPORT TAKEN: 2132    DIAGNOSIS: NSTEMI    REASON FOR TRANSFER TO [facility]: HIGHER LEVEL OF CARE    TRANSPORTATION: AMBULANCE    CLINICAL REASON FOR TRANSFER TO [facility]: PT PRESENTED TO ER [facility] CHEST PAIN THAT STARTED APPROX 4 DAYS AGO.      CLINICAL INFORMATION    HEIGHT: 61 IN    WEIGHT: 127LBS    ALLERGIES: SULFA, ISOSORBIDE    WALL:     INFECTIOUS DISEASE:     ISOLATION:     1ST VITAL SIGNS:   TIME:   TEMP:   PULSE:   B/P:   RESP:     LAST VITAL SIGNS:  TIME: 2132  TEMP: 98.4  PULSE: 85  B/P: 151/73  RESP:     LAB INFORMATION: GLUCOSE 237    CULTURE INFORMATION:     MEDS/IV FLUIDS: 20G RFA  1 INCH NITRO  LOVENOX 80MG      CARDIAC SYSTEM:    CHEST PAIN: Y    RATE:     SCALE:     RHYTHM: SR    Is patient taking or has patient been given any drugs that could increase bleeding? Y  (Plavix, Brilinta, Effient, Eliquis, Xarelto, Warfarin, Integrilin, Angiomax)    DRUG: ASA     DOSE/FREQUENCY: 81MG DAILY    CARDIAC ENZYMES:    DATE: 11/9/18  TIME: 1730  TROP: 1.15    DATE: 11/9/18  TIME: 2035  TROP: 3.69      HEART CATH:     HEART CATH DATE:     HEART CATH RESULTS:     LAD:   RCA:   CX:   LMAIN:   EF:     SWAN:     SITE:   SIZE:    DATE INSERTED:     ARTLINE:     SITE:   SIZE:   DATE INSERTED:     SHEATH:    SITE:   SIZE:   DATE INSERTED:         VASOSEAL:    SITE:   DATE INSERTED:      EXTERNAL PACEMAKER:     RATE:   EXT PACER ON:     MODE:    DATE INSERTED:   OUTPUT SETTING:   SENSITIVITY SETTING:   SENSITIVITY TYPE:     IABP:    SITE:   AUG PRESSURE:   DATE INSERTED:     CARDIAC NOTES:       RESPIRATORY SYSTEM:    LUNG SOUNDS:    CLEAR: CTA  CRACKLES:   WHEEZES:   RHONCHI:   DIMINISHED:     ABG DATE:         ABG TIME:     ABG RESULTS:    PH:   PO2:   PCO2:   HCO3:   O2 SAT:       ETT:     ETT SIZE:     ORAL:     NASAL:     SECURED AT MEASUREMENT (CM):     ON VENTILATOR:    TV:   FI02:   RATE:   PEEP:     OXYGEN: N    O2 SAT: 95    ADMINISTRATION ROUTE: ROOM AIR    IMAGING FINDINGS:     PNEUMO LOCATION:     PNEUMO SIZE:     PNEUMO CHEST TUBE SEAL TYPE:     RADIOLOGY RESULTS:     RESPIRATORY STATUS:       CNS/MUSCULOSKELETAL    ALERT AND ORIENTED:    PERSON: Y  PLACE: Y  TIME: Y    INJURY:  WHERE:     JOVANNI COMA SCALE:    E: 4  M: 6  V: 5    STROKE SCALE:     SIZE OF HEMORRHAGE:     SYMPTOMS: (CHOOSE APPROPRIATE)    ASPHASIA:   ATAXIA:   DYSARTHRIA:   DYSPHASIA:   HEADACHE:   PARALYSIS:   SEIZURE:   SYNCOPE:   VERTIGO:   VISION CHANGE:        EXTREMITY WEAKNESS:    LEFT ARM:   RIGHT ARM:   LEFT LEG:   RIGHT LEG:     CAT SCAN RESULTS:     MRI RESULTS:     CNS/MUSCULOSKELETAL NOTES:       GI//GY      ABDOMINAL PAIN: N    VOMITING: N    DIARRHEA: N    NAUSEA: N    BOWEL SOUNDS: ACTIVE    OCCULT STOOL: N    VAGINAL BLEEDING: N    TESTICULAR PAIN: N/A    HEMATURIA: N    NG TUBE:    SIZE:   DATE INSERTED:       ULTRASOUND:     ULTRASOUND RESULTS:       ACUTE ABDOMEN:    ACUTE ABDOMEN RESULTS:       CT SCAN:     CT SCAN RESULTS:       GI//GY NOTES:     PAST MEDICAL HISTORY: HTN\DM  HEART DISEASE  ARTHRITIS    OTHER SYMPTOM NOTES:     ADDITIONAL NOTES:           Jennie Stein RN  11/9/2018  9:44 PM

## 2018-11-11 ENCOUNTER — APPOINTMENT (OUTPATIENT)
Dept: CARDIOLOGY | Facility: HOSPITAL | Age: 83
End: 2018-11-11

## 2018-11-11 LAB
ANION GAP SERPL CALCULATED.3IONS-SCNC: 4 MMOL/L (ref 3–11)
ARTICHOKE IGE QN: 78 MG/DL (ref 0–130)
BH CV ECHO MEAS - BSA(HAYCOCK): 1.6 M^2
BH CV ECHO MEAS - BSA: 1.6 M^2
BH CV ECHO MEAS - BZI_BMI: 24 KILOGRAMS/M^2
BH CV ECHO MEAS - BZI_METRIC_HEIGHT: 154.9 CM
BH CV ECHO MEAS - BZI_METRIC_WEIGHT: 57.6 KG
BH CV XLRA MEAS LEFT CCA RATIO VEL: 92.5 CM/SEC
BH CV XLRA MEAS LEFT DIST CCA EDV: 11.6 CM/SEC
BH CV XLRA MEAS LEFT DIST CCA PSV: 66.7 CM/SEC
BH CV XLRA MEAS LEFT DIST ICA EDV: 13.8 CM/SEC
BH CV XLRA MEAS LEFT DIST ICA PSV: 87.4 CM/SEC
BH CV XLRA MEAS LEFT ICA RATIO VEL: 86.7 CM/SEC
BH CV XLRA MEAS LEFT ICA/CCA RATIO: 0.9
BH CV XLRA MEAS LEFT MID CCA EDV: 11.6 CM/SEC
BH CV XLRA MEAS LEFT MID CCA PSV: 78.3 CM/SEC
BH CV XLRA MEAS LEFT MID ICA EDV: 22 CM/SEC
BH CV XLRA MEAS LEFT MID ICA PSV: 79.8 CM/SEC
BH CV XLRA MEAS LEFT PROX CCA EDV: 17.5 CM/SEC
BH CV XLRA MEAS LEFT PROX CCA PSV: 93.4 CM/SEC
BH CV XLRA MEAS LEFT PROX ECA PSV: 228.7 CM/SEC
BH CV XLRA MEAS LEFT PROX ICA EDV: 26.9 CM/SEC
BH CV XLRA MEAS LEFT PROX ICA PSV: 103.3 CM/SEC
BH CV XLRA MEAS LEFT PROX SCLA PSV: 75.1 CM/SEC
BH CV XLRA MEAS LEFT VERTEBRAL A EDV: 8.4 CM/SEC
BH CV XLRA MEAS LEFT VERTEBRAL A PSV: 47.5 CM/SEC
BH CV XLRA MEAS RIGHT CCA RATIO VEL: 97.4 CM/SEC
BH CV XLRA MEAS RIGHT DIST CCA EDV: 14.1 CM/SEC
BH CV XLRA MEAS RIGHT DIST CCA PSV: 85.6 CM/SEC
BH CV XLRA MEAS RIGHT DIST ICA EDV: 16.6 CM/SEC
BH CV XLRA MEAS RIGHT DIST ICA PSV: 69 CM/SEC
BH CV XLRA MEAS RIGHT ICA RATIO VEL: 88 CM/SEC
BH CV XLRA MEAS RIGHT ICA/CCA RATIO: 0.9
BH CV XLRA MEAS RIGHT MID CCA EDV: 14.9 CM/SEC
BH CV XLRA MEAS RIGHT MID CCA PSV: 98.2 CM/SEC
BH CV XLRA MEAS RIGHT MID ICA EDV: 11.8 CM/SEC
BH CV XLRA MEAS RIGHT MID ICA PSV: 51.5 CM/SEC
BH CV XLRA MEAS RIGHT PROX CCA EDV: 16.5 CM/SEC
BH CV XLRA MEAS RIGHT PROX CCA PSV: 84.1 CM/SEC
BH CV XLRA MEAS RIGHT PROX ECA PSV: 99 CM/SEC
BH CV XLRA MEAS RIGHT PROX ICA EDV: 17.3 CM/SEC
BH CV XLRA MEAS RIGHT PROX ICA PSV: 88.8 CM/SEC
BH CV XLRA MEAS RIGHT PROX SCLA PSV: 99.8 CM/SEC
BH CV XLRA MEAS RIGHT VERTEBRAL A EDV: 10.5 CM/SEC
BH CV XLRA MEAS RIGHT VERTEBRAL A PSV: 60.8 CM/SEC
BUN BLD-MCNC: 17 MG/DL (ref 9–23)
BUN/CREAT SERPL: 22.1 (ref 7–25)
CALCIUM SPEC-SCNC: 8.3 MG/DL (ref 8.7–10.4)
CHLORIDE SERPL-SCNC: 102 MMOL/L (ref 99–109)
CHOLEST SERPL-MCNC: 137 MG/DL (ref 0–200)
CO2 SERPL-SCNC: 31 MMOL/L (ref 20–31)
CREAT BLD-MCNC: 0.77 MG/DL (ref 0.6–1.3)
GFR SERPL CREATININE-BSD FRML MDRD: 72 ML/MIN/1.73
GLUCOSE BLD-MCNC: 238 MG/DL (ref 70–100)
GLUCOSE BLDC GLUCOMTR-MCNC: 222 MG/DL (ref 70–130)
GLUCOSE BLDC GLUCOMTR-MCNC: 253 MG/DL (ref 70–130)
GLUCOSE BLDC GLUCOMTR-MCNC: 261 MG/DL (ref 70–130)
GLUCOSE BLDC GLUCOMTR-MCNC: 263 MG/DL (ref 70–130)
GLUCOSE BLDC GLUCOMTR-MCNC: 272 MG/DL (ref 70–130)
GLUCOSE BLDC GLUCOMTR-MCNC: 322 MG/DL (ref 70–130)
HDLC SERPL-MCNC: 36 MG/DL (ref 40–60)
PA ADP PRP-ACNC: 154 PRU
POTASSIUM BLD-SCNC: 4 MMOL/L (ref 3.5–5.5)
SODIUM BLD-SCNC: 137 MMOL/L (ref 132–146)
TRIGL SERPL-MCNC: 278 MG/DL (ref 0–150)

## 2018-11-11 PROCEDURE — 99232 SBSQ HOSP IP/OBS MODERATE 35: CPT | Performed by: INTERNAL MEDICINE

## 2018-11-11 PROCEDURE — 63710000001 INSULIN DETEMIR PER 5 UNITS: Performed by: INTERNAL MEDICINE

## 2018-11-11 PROCEDURE — 80061 LIPID PANEL: CPT | Performed by: INTERNAL MEDICINE

## 2018-11-11 PROCEDURE — 25010000002 ENOXAPARIN PER 10 MG: Performed by: INTERNAL MEDICINE

## 2018-11-11 PROCEDURE — 80048 BASIC METABOLIC PNL TOTAL CA: CPT | Performed by: INTERNAL MEDICINE

## 2018-11-11 PROCEDURE — 82962 GLUCOSE BLOOD TEST: CPT

## 2018-11-11 PROCEDURE — 85576 BLOOD PLATELET AGGREGATION: CPT | Performed by: THORACIC SURGERY (CARDIOTHORACIC VASCULAR SURGERY)

## 2018-11-11 PROCEDURE — 99231 SBSQ HOSP IP/OBS SF/LOW 25: CPT | Performed by: THORACIC SURGERY (CARDIOTHORACIC VASCULAR SURGERY)

## 2018-11-11 PROCEDURE — 93880 EXTRACRANIAL BILAT STUDY: CPT

## 2018-11-11 PROCEDURE — 93880 EXTRACRANIAL BILAT STUDY: CPT | Performed by: INTERNAL MEDICINE

## 2018-11-11 PROCEDURE — 94640 AIRWAY INHALATION TREATMENT: CPT

## 2018-11-11 RX ORDER — HYDRALAZINE HYDROCHLORIDE 25 MG/1
25 TABLET, FILM COATED ORAL 3 TIMES DAILY PRN
Status: DISCONTINUED | OUTPATIENT
Start: 2018-11-11 | End: 2018-11-14 | Stop reason: HOSPADM

## 2018-11-11 RX ADMIN — INSULIN LISPRO 3 UNITS: 100 INJECTION, SOLUTION INTRAVENOUS; SUBCUTANEOUS at 08:14

## 2018-11-11 RX ADMIN — CARVEDILOL 3.12 MG: 3.12 TABLET, FILM COATED ORAL at 21:08

## 2018-11-11 RX ADMIN — PANTOPRAZOLE SODIUM 40 MG: 40 TABLET, DELAYED RELEASE ORAL at 07:00

## 2018-11-11 RX ADMIN — CYCLOSPORINE 1 DROP: 0.5 EMULSION OPHTHALMIC at 10:42

## 2018-11-11 RX ADMIN — CYCLOSPORINE 1 DROP: 0.5 EMULSION OPHTHALMIC at 21:07

## 2018-11-11 RX ADMIN — GABAPENTIN 400 MG: 400 CAPSULE ORAL at 02:06

## 2018-11-11 RX ADMIN — CYANOCOBALAMIN TAB 1000 MCG 1000 MCG: 1000 TAB at 08:06

## 2018-11-11 RX ADMIN — DIAZEPAM 2 MG: 2 TABLET ORAL at 21:07

## 2018-11-11 RX ADMIN — BUDESONIDE 0.5 MG: 0.5 INHALANT RESPIRATORY (INHALATION) at 09:24

## 2018-11-11 RX ADMIN — INSULIN DETEMIR 10 UNITS: 100 INJECTION, SOLUTION SUBCUTANEOUS at 21:14

## 2018-11-11 RX ADMIN — TIZANIDINE 4 MG: 4 TABLET ORAL at 21:08

## 2018-11-11 RX ADMIN — FEXOFENADINE HCL 180 MG: 180 TABLET ORAL at 21:11

## 2018-11-11 RX ADMIN — ATORVASTATIN CALCIUM 20 MG: 20 TABLET, FILM COATED ORAL at 08:07

## 2018-11-11 RX ADMIN — GABAPENTIN 400 MG: 400 CAPSULE ORAL at 07:00

## 2018-11-11 RX ADMIN — ENOXAPARIN SODIUM 60 MG: 60 INJECTION SUBCUTANEOUS at 08:08

## 2018-11-11 RX ADMIN — MORPHINE SULFATE 30 MG: 30 TABLET, EXTENDED RELEASE ORAL at 21:08

## 2018-11-11 RX ADMIN — ENOXAPARIN SODIUM 60 MG: 60 INJECTION SUBCUTANEOUS at 21:07

## 2018-11-11 RX ADMIN — LOSARTAN POTASSIUM 100 MG: 50 TABLET ORAL at 08:07

## 2018-11-11 RX ADMIN — VITAMIN D, TAB 1000IU (100/BT) 1000 UNITS: 25 TAB at 08:08

## 2018-11-11 RX ADMIN — Medication 1 TABLET: at 08:07

## 2018-11-11 RX ADMIN — Medication 3 ML: at 21:10

## 2018-11-11 RX ADMIN — GABAPENTIN 400 MG: 400 CAPSULE ORAL at 23:55

## 2018-11-11 RX ADMIN — OXYBUTYNIN CHLORIDE 5 MG: 5 TABLET, EXTENDED RELEASE ORAL at 08:07

## 2018-11-11 RX ADMIN — INSULIN LISPRO 4 UNITS: 100 INJECTION, SOLUTION INTRAVENOUS; SUBCUTANEOUS at 11:48

## 2018-11-11 RX ADMIN — INSULIN LISPRO 4 UNITS: 100 INJECTION, SOLUTION INTRAVENOUS; SUBCUTANEOUS at 17:04

## 2018-11-11 RX ADMIN — CARBAMAZEPINE 100 MG: 200 TABLET ORAL at 08:08

## 2018-11-11 RX ADMIN — INSULIN LISPRO 5 UNITS: 100 INJECTION, SOLUTION INTRAVENOUS; SUBCUTANEOUS at 21:14

## 2018-11-11 RX ADMIN — ASPIRIN 81 MG 81 MG: 81 TABLET ORAL at 08:08

## 2018-11-11 RX ADMIN — INSULIN DETEMIR 7 UNITS: 100 INJECTION, SOLUTION SUBCUTANEOUS at 08:17

## 2018-11-11 RX ADMIN — OXYCODONE HYDROCHLORIDE AND ACETAMINOPHEN 1000 MG: 500 TABLET ORAL at 08:07

## 2018-11-11 RX ADMIN — CARBAMAZEPINE 100 MG: 200 TABLET ORAL at 21:08

## 2018-11-11 RX ADMIN — CARVEDILOL 3.12 MG: 3.12 TABLET, FILM COATED ORAL at 08:07

## 2018-11-11 RX ADMIN — GABAPENTIN 400 MG: 400 CAPSULE ORAL at 17:04

## 2018-11-11 RX ADMIN — MONTELUKAST SODIUM 10 MG: 10 TABLET, FILM COATED ORAL at 21:07

## 2018-11-11 RX ADMIN — AMLODIPINE BESYLATE 10 MG: 10 TABLET ORAL at 08:08

## 2018-11-11 RX ADMIN — GABAPENTIN 400 MG: 400 CAPSULE ORAL at 11:48

## 2018-11-11 RX ADMIN — CHLORTHALIDONE 25 MG: 25 TABLET ORAL at 08:08

## 2018-11-11 RX ADMIN — Medication 650 MG: at 08:07

## 2018-11-11 RX ADMIN — FEXOFENADINE HCL 180 MG: 180 TABLET ORAL at 07:04

## 2018-11-11 RX ADMIN — MORPHINE SULFATE 30 MG: 30 TABLET, EXTENDED RELEASE ORAL at 08:06

## 2018-11-11 NOTE — PROGRESS NOTES
[facility] Medicine Services  PROGRESS NOTE    Patient Name: Fani Bird  : 1935  MRN: [id number]    Date of Admission: 2018  Length of Stay: 1  Primary Care Physician: Emerson Alvarez MD    Subjective   Subjective     CC:  DMII    HPI:  No chest pain or dyspnea    Review of Systems  Gen- No fevers, chills  CV- No chest pain, palpitations  Resp- No cough, dyspnea  GI- No N/V/D, abd pain    Otherwise ROS is negative except as mentioned in the HPI.    Objective   Objective     Vital Signs:   Temp:  [97.3 °F (36.3 °C)-98.5 °F (36.9 °C)] 98.2 °F (36.8 °C)  Heart Rate:  [67-77] 75  Resp:  [14-18] 18  BP: (126-165)/(53-72) 126/53        Physical Exam:  Constitutional -no acute distress, in bed  HEENT-NCAT, mucous membranes moist  CV-RRR, no murmur  Resp-CTAB  Abd-soft, non-tender, non-distended, normo active bowel sounds  Ext-No lower extremity cyanosis, clubbing or edema bilaterally  Neuro-alert and oriented, speech clear, moves all extremities   Psych-normal affect   Skin- No rash on exposed UE or LE bilaterally      Results Reviewed:  I have personally reviewed current lab, radiology, and data and agree.    Results from last 7 days   Lab Units  11/10/18   0546  11/10/18   0019   WBC 10*3/mm3  9.36  9.62   HEMOGLOBIN g/dL  11.4*  12.8   HEMATOCRIT %  34.8  39.4   PLATELETS 10*3/mm3  221  239     Results from last 7 days   Lab Units  18   0418  11/10/18   1152  11/10/18   0546  11/10/18   0019   SODIUM mmol/L  137   --   136  136   POTASSIUM mmol/L  4.0   --   3.5  3.7   CHLORIDE mmol/L  102   --   100  98*   CO2 mmol/L  31.0   --   27.0  30.0   BUN mg/dL  17   --   16  16   CREATININE mg/dL  0.77   --   0.62  0.75   GLUCOSE mg/dL  238*   --   203*  270*   CALCIUM mg/dL  8.3*   --   8.9  9.6   ALT (SGPT) U/L   --    --    --   30   AST (SGOT) U/L   --    --    --   52*   TROPONIN I ng/mL   --   11.376*  15.071*  11.854*     Estimated Creatinine Clearance: 45 mL/min  (by C-G formula based on SCr of 0.77 mg/dL).  No results found for: BNP    Microbiology Results Abnormal     None          Imaging Results (last 24 hours)     ** No results found for the last 24 hours. **        Results for orders placed during the hospital encounter of 11/09/18   Adult Transthoracic Echo Complete W/ Cont if Necessary Per Protocol    Narrative · Left ventricular systolic function is normal. Estimated EF = 60%.  · The following left ventricular wall segments are hypokinetic: basal   inferolateral and mid inferolateral.  · Left ventricular diastolic dysfunction (grade I a) consistent with   impaired relaxation.          I have reviewed the medications.      amLODIPine 10 mg Oral Daily   aspirin 81 mg Oral Daily   atorvastatin 20 mg Oral Daily   budesonide 0.5 mg Nebulization BID - RT   carBAMazepine 100 mg Oral BID   carvedilol 3.125 mg Oral Q12H   chlorthalidone 25 mg Oral Daily   cholecalciferol 1,000 Units Oral Daily   cycloSPORINE 1 drop Both Eyes BID   diazePAM 2 mg Oral Nightly   enoxaparin 1 mg/kg Subcutaneous Q12H   ferrous sulfate 650 mg Oral Daily With Breakfast   fexofenadine 180 mg Oral Q12H   gabapentin 400 mg Oral 4x Daily   insulin detemir 7 Units Subcutaneous Q12H   insulin lispro 0-7 Units Subcutaneous 4x Daily With Meals & Nightly   losartan 100 mg Oral Daily   montelukast 10 mg Oral Nightly   Morphine 30 mg Oral Q12H   OCUVITE-LUTEIN 1 tablet Oral Daily   oxybutynin XL 5 mg Oral Daily   pantoprazole 40 mg Oral Q AM   sodium chloride 3 mL Intravenous Q12H   tiZANidine 4 mg Oral Nightly   vitamin B-12 1,000 mcg Oral Daily   ascorbic acid 1,000 mg Oral Daily         Assessment/Plan   Assessment / Plan     Active Hospital Problems    Diagnosis   • **NSTEMI (non-ST elevated myocardial infarction) (CMS/[facility])   • Hypertension     uncontrolled. I will add chlorthalidone 25 to her current regimen     • GERD (gastroesophageal reflux disease)   • Dyslipidemia     May 2012 - Total 156,  triglycerides 297, HDL 47, and LDL 54; on statin therapy      • Diabetes mellitus (CMS/[facility])     Hemoglobin A1c of 8.3.     • Coronary artery disease     post percutaneous coronary intervention and stenting with low normal left ventricular ejection fraction     • Cerebrovascular disease     a. TIA/CVA, September 2013.  b. Carotid CTA, September 2013:  60% to 70% left carotid bulb stenosis, severe right vertebral stenosis.     • Chest pain     c. On 01/10/2014:  NSTEMI with 3.0 x 15 Integrity BMS to ostium of circumflex per MGR.  Normal LVEF.  d. September 2015, admission to [facility] with ACS with negative enzymes and negative EKG.             Brief Hospital Course to date:  Fani Bird is a 82 y.o. female with h/o CAD and DMII presents after three days of chest pressure.       Assessment & Plan:    DMII  - a1c 8.3  - continue Increase basal insulin from 7 to 10 units BID    NSTEMI  - LHC shows multivessel disease  - bypass grafting next week.    Dyslipidemia  - LDL 86  -     HTN      DVT Prophylaxis:  lovenox    CODE STATUS:   Code Status and Medical Interventions:   Ordered at: 11/10/18 1124     Level Of Support Discussed With:    Patient     Code Status:    CPR     Medical Interventions (Level of Support Prior to Arrest):    Full       Disposition: I expect the patient to be discharged home in ? days.      Electronically signed by Júnior Bishop MD, 11/11/18, 4:26 PM.

## 2018-11-11 NOTE — PLAN OF CARE
Problem: Patient Care Overview  Goal: Plan of Care Review  Outcome: Ongoing (interventions implemented as appropriate)   11/11/18 3715   Coping/Psychosocial   Plan of Care Reviewed With patient   Plan of Care Review   Progress no change   OTHER   Outcome Summary SBP elevated this AM (165/71) - improved after AM meds (136/58). FSBS have been in the 200's today. NSR on monitor. Denies chest pain. Will continue to monitor.

## 2018-11-11 NOTE — PLAN OF CARE
Problem: Patient Care Overview  Goal: Plan of Care Review  Outcome: Ongoing (interventions implemented as appropriate)   11/11/18 0630   Coping/Psychosocial   Plan of Care Reviewed With patient   Coping/Psychosocial   Patient Agreement with Plan of Care agrees   Plan of Care Review   Progress no change   OTHER   Outcome Summary VSS and pt SBP was 140 this am. No complaints of chest pain this shift.

## 2018-11-11 NOTE — PROGRESS NOTES
[facility] at [facility]    Inpatient Progress Note      Chief Complaint/Reason for service:    · Follow-up NSTEMI         Subjective:       Patient up resting in bed. Denies any chest pain or shortness of breath. Patient is anxious about upcoming CABG with Dr. Delacruz.     Past medical, surgical, social and family history reviewed in the patient's electronic medical record.    Review of Systems:   Positive for Anxiety.  Negative for exertional chest pain, dyspnea with exertion, lower extremity edema, palpitations.    Problem List  Active Hospital Problems    Diagnosis Date Noted   • **NSTEMI (non-ST elevated myocardial infarction) (CMS/[facility]) [I21.4] 11/10/2018   • Hypertension [I10]    • GERD (gastroesophageal reflux disease) [K21.9]    • Dyslipidemia [E78.5]    • Diabetes mellitus (CMS/[facility]) [E11.9]    • Coronary artery disease [I25.10]    • Cerebrovascular disease [I67.9]    • Chest pain [R07.9]       Resolved Hospital Problems   No resolved problems to display.            Objective:      Current Facility-Administered Medications:   •  acetaminophen (TYLENOL) tablet 650 mg, 650 mg, Oral, Q4H PRN, Patsy, Katarzyna G, DO, 650 mg at 11/10/18 0649  •  albuterol (PROVENTIL) nebulizer solution 0.083% 2.5 mg/3mL, 2.5 mg, Nebulization, Q4H PRN, Patsy, Katarzyna G, DO, 2.5 mg at 11/10/18 2053  •  amLODIPine (NORVASC) tablet 10 mg, 10 mg, Oral, Daily, Patsy, Katarzyna G, DO, 10 mg at 11/11/18 0808  •  aspirin chewable tablet 81 mg, 81 mg, Oral, Daily, Patsy, Katarzyna G, DO, 81 mg at 11/11/18 0808  •  atorvastatin (LIPITOR) tablet 20 mg, 20 mg, Oral, Daily, Patsy, Katarzyna G, DO, 20 mg at 11/11/18 0807  •  budesonide (PULMICORT) nebulizer solution 0.5 mg, 0.5 mg, Nebulization, BID - RT, Patsy, Katarzyna G, DO, 0.5 mg at 11/10/18 2053  •  carBAMazepine (TEGretol) tablet 100 mg, 100 mg, Oral, BID, Roxane, Ayde, APRN, 100 mg at 11/11/18 0808  •  carvedilol (COREG) tablet 3.125 mg, 3.125 mg, Oral, Q12H, Katarzyna Garner  G, DO, 3.125 mg at 11/11/18 0807  •  chlorthalidone (HYGROTON) tablet 25 mg, 25 mg, Oral, Daily, PatsyMaria Eugenia urrutiae G, DO, 25 mg at 11/11/18 0808  •  cholecalciferol (VITAMIN D3) tablet 1,000 Units, 1,000 Units, Oral, Daily, Patsy, Katarzyna G, DO, 1,000 Units at 11/11/18 0808  •  cycloSPORINE (RESTASIS) 0.05 % ophthalmic emulsion 1 drop, 1 drop, Both Eyes, BID, Patsy, Katarzyna G, DO, 1 drop at 11/10/18 2040  •  dextrose (D50W) 25 g/ 50mL Intravenous Solution 25 g, 25 g, Intravenous, Q15 Min PRN, PatsyKate urrutiasie G, DO  •  dextrose (GLUTOSE) oral gel 15 g, 15 g, Oral, Q15 Min PRN, Patsy, Katarzyna G, DO  •  diazePAM (VALIUM) tablet 2 mg, 2 mg, Oral, Nightly, Maria Eugenia Garnere G, DO, 2 mg at 11/10/18 2031  •  enoxaparin (LOVENOX) syringe 60 mg, 1 mg/kg, Subcutaneous, Q12H, Bethel Mercedes MD, 60 mg at 11/11/18 0808  •  ferrous sulfate tablet 650 mg, 650 mg, Oral, Daily With Breakfast, Júnior Bishop MD, 650 mg at 11/11/18 0807  •  fexofenadine (ALLEGRA) tablet 180 mg, 180 mg, Oral, Q12H, Júnior Bishop MD, 180 mg at 11/11/18 0704  •  gabapentin (NEURONTIN) capsule 400 mg, 400 mg, Oral, 4x Daily, Kate Garnersie G, DO, 400 mg at 11/11/18 0700  •  glucagon (human recombinant) (GLUCAGEN DIAGNOSTIC) injection 1 mg, 1 mg, Subcutaneous, PRN, PatsyKate urrutiasie G, DO  •  insulin detemir (LEVEMIR) injection 7 Units, 7 Units, Subcutaneous, Q12H, Júnior Bishop MD, 7 Units at 11/11/18 0817  •  insulin lispro (humaLOG) injection 0-7 Units, 0-7 Units, Subcutaneous, 4x Daily With Meals & Nightly, Patsy, Katarzyna G, DO, 3 Units at 11/11/18 0814  •  losartan (COZAAR) tablet 100 mg, 100 mg, Oral, Daily, Patsy, Katarzyna G, DO, 100 mg at 11/11/18 0807  •  montelukast (SINGULAIR) tablet 10 mg, 10 mg, Oral, Nightly, Patsy, Katarzyna G, DO, 10 mg at 11/10/18 2031  •  Morphine (MS CONTIN) 12 hr tablet 30 mg, 30 mg, Oral, Q12H, Patsy, Katarzyna G, DO, 30 mg at 11/11/18 0806  •  nitroglycerin (NITROSTAT) SL tablet 0.4 mg, 0.4 mg, Sublingual, Q5 Min PRN, Patsy, Katarzyna G,  DO  •  nitroglycerin 50 mg/250 mL (0.2 mg/mL) infusion, 10-50 mcg/min, Intravenous, Titrated, Bethel Mercedes MD, Stopped at 11/10/18 1625  •  OCUVITE-LUTEIN (OCUVITE) tablet 1 tablet, 1 tablet, Oral, Daily, Patsy, Katarzyna G, DO, 1 tablet at 11/11/18 0807  •  ondansetron (ZOFRAN) tablet 4 mg, 4 mg, Oral, Q6H PRN **OR** ondansetron (ZOFRAN) injection 4 mg, 4 mg, Intravenous, Q6H PRN, Patsy, Katarzyna G, DO, 4 mg at 11/10/18 0717  •  oxybutynin XL (DITROPAN-XL) 24 hr tablet 5 mg, 5 mg, Oral, Daily, Patsy, Katarzyna G, DO, 5 mg at 11/11/18 0807  •  pantoprazole (PROTONIX) EC tablet 40 mg, 40 mg, Oral, Q AM, Patsy, Katarzyna G, DO, 40 mg at 11/11/18 0700  •  sodium chloride 0.9 % flush 3 mL, 3 mL, Intravenous, Q12H, Patsy, Katarzyna G, DO, 3 mL at 11/10/18 2040  •  sodium chloride 0.9 % flush 3-10 mL, 3-10 mL, Intravenous, PRN, Patsy, Katarzyna G, DO  •  sodium chloride 0.9 % infusion, 1-3 mL/kg/hr, Intravenous, Continuous, Bethel Mercedes MD, Stopped at 11/10/18 1745  •  tiZANidine (ZANAFLEX) tablet 4 mg, 4 mg, Oral, Nightly, Roxane, Ayde, APRN, 4 mg at 11/10/18 2031  •  vitamin B-12 (CYANOCOBALAMIN) tablet 1,000 mcg, 1,000 mcg, Oral, Daily, Patsy, Katarzyna G, DO, 1,000 mcg at 11/11/18 0806  •  vitamin C (ASCORBIC ACID) tablet 1,000 mg, 1,000 mg, Oral, Daily, Patsy, Katarzyna G, DO, 1,000 mg at 11/11/18 0807    Vital Sign Min/Max for last 24 hours  Temp  Min: 97.3 °F (36.3 °C)  Max: 98.6 °F (37 °C)   BP  Min: 118/50  Max: 165/71   Pulse  Min: 67  Max: 77   Resp  Min: 14  Max: 18   SpO2  Min: 90 %  Max: 99 %   No Data Recorded      Intake/Output Summary (Last 24 hours) at 11/11/2018 0851  Last data filed at 11/11/2018 0713  Gross per 24 hour   Intake 720 ml   Output 2250 ml   Net -1530 ml           CONSTITUTIONAL: No acute distress, normal affect  RESPIRATORY: Normal effort. Clear to auscultation bilaterally without wheezing or rales  CARDIOVASCULAR: Regular rate and rhythm with normal S1 and S2. Without murmur, gallop or rub.  PERIPHERAL  VASCULAR: Normal radial pulses bilaterally. There is no significant peripheral edema bilaterally.    Results Review:   Lab Results   Component Value Date    TROPONINI 11.376 (C) 11/10/2018       BUN   Date Value Ref Range Status   11/11/2018 17 9 - 23 mg/dL Final     Creatinine   Date Value Ref Range Status   11/11/2018 0.77 0.60 - 1.30 mg/dL Final     Potassium   Date Value Ref Range Status   11/11/2018 4.0 3.5 - 5.5 mmol/L Final     ALT (SGPT)   Date Value Ref Range Status   11/10/2018 30 7 - 40 U/L Final     AST (SGOT)   Date Value Ref Range Status   11/10/2018 52 (H) 0 - 33 U/L Final       Lab Results   Component Value Date    CHOL 137 11/11/2018    CHOL 152 11/10/2018     Lab Results   Component Value Date    TRIG 278 (H) 11/11/2018    TRIG 379 (H) 11/10/2018     Lab Results   Component Value Date    HDL 36 (L) 11/11/2018    HDL 37 (L) 11/10/2018     No components found for: LDLCALC  No results found for: VLDL  No results found for: LDLHDL    Tele:  NSR    TTE 11/10/2018  · Left ventricular systolic function is normal. Estimated EF = 60%.  · The following left ventricular wall segments are hypokinetic: basal inferolateral and mid inferolateral.  · Left ventricular diastolic dysfunction (grade I a) consistent with impaired relaxation.    [facility] 11/10/2018  · There is severe distal left main as well as ostial LAD and ostial circumflex stenosis.  · There is a 40% hazy, calcified distal left main stenosis at the bifurcation of the LAD and circumflex arteries.  There is a 60% hazy, calcified ostial LAD stenosis.  There is a 95% in-stent restenosis in a previously placed stent in the ostial to proximal circumflex artery.  · Normal left ventricular ejection fraction with no regional wall motion abnormalities         Assessment/Plan:     ASSESSMENT:  -NSTEMI, troponin 11.854 on arrival, now 15.071. Currently pain free with nitroglycerin. Last ischemic evaluation 1/2014 with placement of bare metal stent. Cardiac  catheterization revealed severe distal left, ostial LAD, and ostial circumflex stenosis.  -CAD, s/p BMS 1/2014, ASA and statin at home. Follows with Dr. Whitley as an outpatient.   -Hypertension, on multiple antihypertensives as an outpatient.   -Hyperlipidemia, atorvastatin, LDL 86   -Diabtetes Mellitus    PLAN:  -Tentative CABG x2 with Dr. Delacruz next week.   -Continue current CV meds.  -Added PRN hydralazine for SBP > 160mmHg    KERI Joe obtained past medical, family history, social history, review of systems and functioned as a scribe for the remainder of the dictation for Dr. Mercedes.    Electronically signed by KERI Beyer, 11/11/18, 9:06 AM.      11/11/2018    I, Bethel Mercedes MD, personally performed the services as scribed by the above named individual. I have made any necessary edits and it is both accurate and complete.     Bethel Mercedes MD, MSc, [facility]  Interventional Cardiology  [facility] at [facility]

## 2018-11-11 NOTE — PROGRESS NOTES
Fani Bird  [id number]  1935      CC: I feel pretty good idea remember talking to you yesterday    Subjective:  82-year-old  female with a documented past history of coronary artery disease and a previous cerebrovascular accident.  For the past 2-3 weeks she has had intermittent episodes of chest pain.  Cardiac catheterization revealed a tight left main coronary artery stenosis.  The patient is being prepared for coronary artery bypass surgery next week.      NSTEMI (non-ST elevated myocardial infarction) (CMS/HCC)    Chest pain    Cerebrovascular disease    Coronary artery disease    Hypertension    Dyslipidemia    GERD (gastroesophageal reflux disease)    Diabetes mellitus (CMS/HCC)      Objective:    Vital Signs:  Temp:  [97.3 °F (36.3 °C)-98.6 °F (37 °C)] 98 °F (36.7 °C)  Heart Rate:  [67-77] 76  Resp:  [14-18] 18  BP: (118-165)/(50-72) 149/72    Physical Exam:   General Appearance: alert, appears stated age    Lungs: clear to auscultation    Heart: regular rhythm & normal rate, normal S1, S2,no murmur    Skin:warm and dry        Results from last 7 days   Lab Units  11/10/18   0546   WBC 10*3/mm3  9.36   HEMOGLOBIN g/dL  11.4*   HEMATOCRIT %  34.8   PLATELETS 10*3/mm3  221     Results from last 7 days   Lab Units  11/11/18   0418   SODIUM mmol/L  137   POTASSIUM mmol/L  4.0   CHLORIDE mmol/L  102   CO2 mmol/L  31.0   BUN mg/dL  17   CREATININE mg/dL  0.77   GLUCOSE mg/dL  238*   CALCIUM mg/dL  8.3*       Imaging Results (last 24 hours)     ** No results found for the last 24 hours. **           Diagnosis:  CAD with subsequent NSTEMI and shortness of breath.  Patient has previously had a cerebrovascular accident and has limited mobility secondary to theCVA.      Plan   Schedule CABG next week  I have reviewed, verified, and confirmed the above history and current status.  I have examined the patient and confirmed the above physical findings.    Srinivasa Delacruz MD  11/11/18  12:24 PM

## 2018-11-12 ENCOUNTER — TELEPHONE (OUTPATIENT)
Dept: CARDIOLOGY | Facility: CLINIC | Age: 83
End: 2018-11-12

## 2018-11-12 LAB
GLUCOSE BLDC GLUCOMTR-MCNC: 254 MG/DL (ref 70–130)
GLUCOSE BLDC GLUCOMTR-MCNC: 335 MG/DL (ref 70–130)
GLUCOSE BLDC GLUCOMTR-MCNC: 357 MG/DL (ref 70–130)
GLUCOSE BLDC GLUCOMTR-MCNC: 358 MG/DL (ref 70–130)

## 2018-11-12 PROCEDURE — 99232 SBSQ HOSP IP/OBS MODERATE 35: CPT | Performed by: INTERNAL MEDICINE

## 2018-11-12 PROCEDURE — 25010000002 ENOXAPARIN PER 10 MG: Performed by: INTERNAL MEDICINE

## 2018-11-12 PROCEDURE — 94640 AIRWAY INHALATION TREATMENT: CPT

## 2018-11-12 PROCEDURE — 63710000001 INSULIN DETEMIR PER 5 UNITS: Performed by: INTERNAL MEDICINE

## 2018-11-12 PROCEDURE — 63710000001 INSULIN LISPRO (HUMAN) PER 5 UNITS: Performed by: INTERNAL MEDICINE

## 2018-11-12 PROCEDURE — 82962 GLUCOSE BLOOD TEST: CPT

## 2018-11-12 RX ORDER — CLOPIDOGREL BISULFATE 75 MG/1
75 TABLET ORAL DAILY
Status: DISCONTINUED | OUTPATIENT
Start: 2018-11-12 | End: 2018-11-14 | Stop reason: HOSPADM

## 2018-11-12 RX ORDER — PANTOPRAZOLE SODIUM 40 MG/1
40 TABLET, DELAYED RELEASE ORAL ONCE
Status: COMPLETED | OUTPATIENT
Start: 2018-11-12 | End: 2018-11-12

## 2018-11-12 RX ORDER — ATORVASTATIN CALCIUM 40 MG/1
80 TABLET, FILM COATED ORAL DAILY
Status: DISCONTINUED | OUTPATIENT
Start: 2018-11-13 | End: 2018-11-14 | Stop reason: HOSPADM

## 2018-11-12 RX ADMIN — CYCLOSPORINE 1 DROP: 0.5 EMULSION OPHTHALMIC at 20:34

## 2018-11-12 RX ADMIN — ATORVASTATIN CALCIUM 20 MG: 20 TABLET, FILM COATED ORAL at 09:07

## 2018-11-12 RX ADMIN — OXYBUTYNIN CHLORIDE 5 MG: 5 TABLET, EXTENDED RELEASE ORAL at 09:07

## 2018-11-12 RX ADMIN — INSULIN DETEMIR 12 UNITS: 100 INJECTION, SOLUTION SUBCUTANEOUS at 20:29

## 2018-11-12 RX ADMIN — CARBAMAZEPINE 100 MG: 200 TABLET ORAL at 20:32

## 2018-11-12 RX ADMIN — CYCLOSPORINE 1 DROP: 0.5 EMULSION OPHTHALMIC at 09:00

## 2018-11-12 RX ADMIN — CARVEDILOL 3.12 MG: 3.12 TABLET, FILM COATED ORAL at 09:09

## 2018-11-12 RX ADMIN — OXYCODONE HYDROCHLORIDE AND ACETAMINOPHEN 1000 MG: 500 TABLET ORAL at 09:07

## 2018-11-12 RX ADMIN — GABAPENTIN 400 MG: 400 CAPSULE ORAL at 06:33

## 2018-11-12 RX ADMIN — AMLODIPINE BESYLATE 10 MG: 10 TABLET ORAL at 09:07

## 2018-11-12 RX ADMIN — GABAPENTIN 400 MG: 400 CAPSULE ORAL at 12:00

## 2018-11-12 RX ADMIN — Medication 650 MG: at 09:07

## 2018-11-12 RX ADMIN — PANTOPRAZOLE SODIUM 40 MG: 40 TABLET, DELAYED RELEASE ORAL at 20:32

## 2018-11-12 RX ADMIN — CHLORTHALIDONE 25 MG: 25 TABLET ORAL at 12:38

## 2018-11-12 RX ADMIN — LOSARTAN POTASSIUM 100 MG: 50 TABLET ORAL at 09:09

## 2018-11-12 RX ADMIN — FEXOFENADINE HCL 180 MG: 180 TABLET ORAL at 09:00

## 2018-11-12 RX ADMIN — ASPIRIN 81 MG 81 MG: 81 TABLET ORAL at 09:09

## 2018-11-12 RX ADMIN — INSULIN LISPRO 4 UNITS: 100 INJECTION, SOLUTION INTRAVENOUS; SUBCUTANEOUS at 09:05

## 2018-11-12 RX ADMIN — Medication 3 ML: at 20:33

## 2018-11-12 RX ADMIN — ACETAMINOPHEN 650 MG: 325 TABLET ORAL at 00:52

## 2018-11-12 RX ADMIN — ENOXAPARIN SODIUM 60 MG: 60 INJECTION SUBCUTANEOUS at 20:31

## 2018-11-12 RX ADMIN — MORPHINE SULFATE 30 MG: 30 TABLET, EXTENDED RELEASE ORAL at 20:32

## 2018-11-12 RX ADMIN — GABAPENTIN 400 MG: 400 CAPSULE ORAL at 17:08

## 2018-11-12 RX ADMIN — MORPHINE SULFATE 30 MG: 30 TABLET, EXTENDED RELEASE ORAL at 09:07

## 2018-11-12 RX ADMIN — BUDESONIDE 0.5 MG: 0.5 INHALANT RESPIRATORY (INHALATION) at 09:51

## 2018-11-12 RX ADMIN — INSULIN LISPRO 5 UNITS: 100 INJECTION, SOLUTION INTRAVENOUS; SUBCUTANEOUS at 12:00

## 2018-11-12 RX ADMIN — DIAZEPAM 2 MG: 2 TABLET ORAL at 20:31

## 2018-11-12 RX ADMIN — ENOXAPARIN SODIUM 60 MG: 60 INJECTION SUBCUTANEOUS at 09:06

## 2018-11-12 RX ADMIN — CYANOCOBALAMIN TAB 1000 MCG 1000 MCG: 1000 TAB at 09:07

## 2018-11-12 RX ADMIN — Medication 3 ML: at 09:00

## 2018-11-12 RX ADMIN — INSULIN DETEMIR 10 UNITS: 100 INJECTION, SOLUTION SUBCUTANEOUS at 09:04

## 2018-11-12 RX ADMIN — CLOPIDOGREL BISULFATE 75 MG: 75 TABLET ORAL at 20:31

## 2018-11-12 RX ADMIN — VITAMIN D, TAB 1000IU (100/BT) 1000 UNITS: 25 TAB at 09:00

## 2018-11-12 RX ADMIN — CARBAMAZEPINE 100 MG: 200 TABLET ORAL at 09:09

## 2018-11-12 RX ADMIN — FEXOFENADINE HCL 180 MG: 180 TABLET ORAL at 20:35

## 2018-11-12 RX ADMIN — INSULIN LISPRO 6 UNITS: 100 INJECTION, SOLUTION INTRAVENOUS; SUBCUTANEOUS at 17:07

## 2018-11-12 RX ADMIN — PANTOPRAZOLE SODIUM 40 MG: 40 TABLET, DELAYED RELEASE ORAL at 06:33

## 2018-11-12 RX ADMIN — INSULIN LISPRO 6 UNITS: 100 INJECTION, SOLUTION INTRAVENOUS; SUBCUTANEOUS at 20:31

## 2018-11-12 RX ADMIN — TIZANIDINE 4 MG: 4 TABLET ORAL at 20:32

## 2018-11-12 RX ADMIN — MONTELUKAST SODIUM 10 MG: 10 TABLET, FILM COATED ORAL at 20:31

## 2018-11-12 RX ADMIN — ACETAMINOPHEN 650 MG: 325 TABLET ORAL at 09:06

## 2018-11-12 RX ADMIN — CARVEDILOL 3.12 MG: 3.12 TABLET, FILM COATED ORAL at 20:34

## 2018-11-12 RX ADMIN — Medication 1 TABLET: at 09:00

## 2018-11-12 NOTE — PROGRESS NOTES
Discharge Planning Assessment  [facility]     Patient Name: Fani Bird  MRN: [id number]  Today's Date: 11/12/2018    Admit Date: 11/9/2018    Discharge Needs Assessment     Row Name 11/12/18 1422       Living Environment    Lives With  child(jaime), adult    Current Living Arrangements  home/apartment/condo    Primary Care Provided by  self    Provides Primary Care For  no one    Family Caregiver if Needed  spouse    Quality of Family Relationships  helpful;involved;supportive    Able to Return to Prior Arrangements  yes       Resource/Environmental Concerns    Resource/Environmental Concerns  none    Transportation Concerns  car, none       Transition Planning    Patient/Family Anticipates Transition to  inpatient rehabilitation facility;home with family    Patient/Family Anticipated Services at Transition  none    Transportation Anticipated  family or friend will provide       Discharge Needs Assessment    Readmission Within the Last 30 Days  no previous admission in last 30 days    Concerns to be Addressed  discharge planning    Equipment Currently Used at Home  oxygen;walker, rolling;commode    Equipment Needed After Discharge  none    Discharge Facility/Level of Care Needs  nursing facility, skilled    Patient's Choice of Community Agency(s)  [facility]        Discharge Plan     Row Name 11/12/18 5531       Plan    Plan  IDP    Patient/Family in Agreement with Plan  yes    Plan Comments  CM met w pt and daughter. Pt lives w daughter and she will be transporting at d/c. If medically able pt would like to go to rehab at [facility] at d/.  Pt is jackson for a procedure (according to pt) and this hasn't happened yet, so will need to re-evaluate after. PT and OT will need to evaluate post op. F/U on orders (did not put them in r/t procedure and MD preference at this time).  Pt uses a RW, commode and O2 through [facility]. Pt [facility] getting [facility] will need to contact at d/c and  [id number] she is a client of [facility].  Pt daughter is retired RN/CM - Yajaira Cynthia. She is supportive w care. Pt PCP is Emerson conway and her insurance covers her medications. Pt d/c plan is TBD. CM will continue to follow.        Destination      No service coordination in this encounter.      Durable Medical Equipment      No service coordination in this encounter.      Dialysis/Infusion      No service coordination in this encounter.      Home Medical Care      No service coordination in this encounter.      Community Resources      No service coordination in this encounter.          Demographic Summary     Row Name 11/12/18 1420       General Information    Admission Type  inpatient    Arrived From  home    Referral Source  admission list    Reason for Consult  discharge planning    Preferred Language  English       Contact Information    Contact Information Comments  Yajaira (daughter/EC) [phone]        Functional Status     Row Name 11/12/18 1420       Functional Status    Usual Activity Tolerance  moderate       Functional Status, IADL    Medications  independent    Meal Preparation  assistive person    Housekeeping  assistive person    Laundry  assistive person    Shopping  assistive person        Psychosocial    No documentation.       Abuse/Neglect    No documentation.       Legal    No documentation.       Substance Abuse    No documentation.       Patient Forms    No documentation.           Julienne Salmeron, RN

## 2018-11-12 NOTE — PROGRESS NOTES
CTS Progress Note      Chief Complaint: Some shortness of breath      Subjective  Up in bed.  Conversant.  Denies chest pain or shortness of air currently      Objective    Physical Exam:   Vital Signs   Temp:  [98 °F (36.7 °C)-98.9 °F (37.2 °C)] 98.2 °F (36.8 °C)  Heart Rate:  [72-82] 77  Resp:  [14-18] 18  BP: (126-171)/(53-89) 171/72   GEN: NAD   CV: Regular rate and rhythm    RESP: deCreased throughout but clear to auscultation   EXT: Warm no edema    Results     Results from last 7 days   Lab Units  11/10/18   0546   WBC 10*3/mm3  9.36   HEMOGLOBIN g/dL  11.4*   HEMATOCRIT %  34.8   PLATELETS 10*3/mm3  221     Results from last 7 days   Lab Units  11/11/18   0418   SODIUM mmol/L  137   POTASSIUM mmol/L  4.0   CHLORIDE mmol/L  102   CO2 mmol/L  31.0   BUN mg/dL  17   CREATININE mg/dL  0.77   GLUCOSE mg/dL  238*   CALCIUM mg/dL  8.3*             Assessment/Plan       NSTEMI (non-ST elevated myocardial infarction) (CMS/HCC)    Chest pain    Cerebrovascular disease    Coronary artery disease    Hypertension    Dyslipidemia    GERD (gastroesophageal reflux disease)    Diabetes mellitus (CMS/HCC)        Plan   Continue preop workup for tentative CABG next week  Patient and family requesting to speak with Dr. Cornejo and YURIY Logan  11/12/18  8:09 AM     Stable.  No chest pain.  Dr. Cornejo to see and schedule CABG.      Srinivasa Delacruz MD  [facility]  11/12/18   1:57 PM

## 2018-11-12 NOTE — PROGRESS NOTES
"[facility] at [facility] Progress Note     LOS: 2 days   Patient Care Team:  Emerson Alvarez MD as PCP - General (Family Medicine)  Emerson Alvarez MD as PCP - Claims Attributed  PCP:  Emerson Alvarez MD    Chief Complaint:  Fu nstemi    SUBJECTIVE: no dyspnea or cp      Review of Systems:   All systems have been reviewed and are negative with the exception of those mentioned above.      OBJECTIVE:    Vital Sign Min/Max for last 24 hours  Temp  Min: 97.7 °F (36.5 °C)  Max: 98.9 °F (37.2 °C)   BP  Min: 125/50  Max: 179/88   Pulse  Min: 66  Max: 82   Resp  Min: 14  Max: 18   SpO2  Min: 94 %  Max: 95 %   No Data Recorded   No Data Recorded     Flowsheet Rows      First Filed Value   Admission Height  154.9 cm (60.98\") Documented at 11/12/2018 0456   Admission Weight  57.6 kg (127 lb) Documented at 11/09/2018 2300          Telemetry: sr      Intake/Output Summary (Last 24 hours) at 11/12/2018 1758  Last data filed at 11/12/2018 1750  Gross per 24 hour   Intake 480 ml   Output 2400 ml   Net -1920 ml     Intake & Output (last 3 days)       11/09 0701 - 11/10 0700 11/10 0701 - 11/11 0700 11/11 0701 - 11/12 0700 11/12 0701 - 11/13 0700    P.O.  720 1080 480    Total Intake(mL/kg)  720 (12.5) 1080 (18.8) 480 (8.3)    Urine (mL/kg/hr) 1200 2050 (1.5) 3400 (2.5) 600 (1)    Stool 0  0 0    Total Output 1200 2050 3400 600    Net -1200 -1330 -2320 -120            Stool Unmeasured Occurrence 1 x  1 x 1 x           Physical Exam:  Physical Exam   A&O X 3  s1s2 hsm   Chest scant crackle  abd soft +bs  Ext no edema  LABS/DIAGNOSTIC DATA:  Results from last 7 days   Lab Units  11/10/18   0546  11/10/18   0019   WBC 10*3/mm3  9.36  9.62   HEMOGLOBIN g/dL  11.4*  12.8   HEMATOCRIT %  34.8  39.4   PLATELETS 10*3/mm3  221  239     No results found for: TROPONINT      Results from last 7 days   Lab Units  11/11/18   0418  11/10/18   0546  11/10/18   0019   SODIUM mmol/L  137  136  136   POTASSIUM mmol/L  4.0  3.5  3.7 "   CHLORIDE mmol/L  102  100  98*   CO2 mmol/L  31.0  27.0  30.0   BUN mg/dL  17  16  16   CREATININE mg/dL  0.77  0.62  0.75   CALCIUM mg/dL  8.3*  8.9  9.6   BILIRUBIN mg/dL   --    --   0.2*   ALK PHOS U/L   --    --   74   ALT (SGPT) U/L   --    --   30   AST (SGOT) U/L   --    --   52*   GLUCOSE mg/dL  238*  203*  270*     Results from last 7 days   Lab Units  11/10/18   0546   HEMOGLOBIN A1C %  8.30*     Results from last 7 days   Lab Units  11/11/18   0418   CHOLESTEROL mg/dL  137   TRIGLYCERIDES mg/dL  278*   HDL CHOL mg/dL  36*   LDL CHOL mg/dL  78               Medication Review:     amLODIPine 10 mg Oral Daily   aspirin 81 mg Oral Daily   atorvastatin 20 mg Oral Daily   budesonide 0.5 mg Nebulization BID - RT   carBAMazepine 100 mg Oral BID   carvedilol 3.125 mg Oral Q12H   chlorthalidone 25 mg Oral Daily   cholecalciferol 1,000 Units Oral Daily   cycloSPORINE 1 drop Both Eyes BID   diazePAM 2 mg Oral Nightly   enoxaparin 1 mg/kg Subcutaneous Q12H   ferrous sulfate 650 mg Oral Daily With Breakfast   fexofenadine 180 mg Oral Q12H   gabapentin 400 mg Oral 4x Daily   insulin detemir 12 Units Subcutaneous Q12H   insulin lispro 0-7 Units Subcutaneous 4x Daily With Meals & Nightly   insulin lispro 5 Units Subcutaneous TID With Meals   losartan 100 mg Oral Daily   montelukast 10 mg Oral Nightly   Morphine 30 mg Oral Q12H   OCUVITE-LUTEIN 1 tablet Oral Daily   oxybutynin XL 5 mg Oral Daily   pantoprazole 40 mg Oral Q AM   sodium chloride 3 mL Intravenous Q12H   tiZANidine 4 mg Oral Nightly   vitamin B-12 1,000 mcg Oral Daily   ascorbic acid 1,000 mg Oral Daily        nitroglycerin 10-50 mcg/min Last Rate: Stopped (11/10/18 1625)   sodium chloride 1-3 mL/kg/hr Last Rate: Stopped (11/10/18 1745)          NSTEMI (non-ST elevated myocardial infarction) (CMS/HCC)    Chest pain    Cerebrovascular disease    Coronary artery disease    Hypertension    Dyslipidemia    GERD (gastroesophageal reflux disease)    Diabetes  mellitus (CMS/HCC)      ASSESSMENT/PLAN:  nstemi w distal LM/ostial CX ISS/ostial LAD calcified stenosis EF 60%  Pt poor candidate for CABG due to dismal arthritis and functional capacity.  After discussion w patient and daughter they wish to pursue non surgical options.      Will DAPT & high dose statin to current regimen.        Chinmay Whitley MD   11/12/18  5:58 PM

## 2018-11-12 NOTE — TELEPHONE ENCOUNTER
Called and left VM that Dr Whitley would be rounding on her mother this evening. If she has further questions please call.

## 2018-11-12 NOTE — PROGRESS NOTES
[facility] Medicine Services  PROGRESS NOTE    Patient Name: Fani Bird  : 1935  MRN: [id number]    Date of Admission: 2018  Length of Stay: 2  Primary Care Physician: Emerson Alvarez MD    Subjective   Subjective     CC:  DMII    HPI:  Denies chest pain, no dyspnea      Review of Systems  Gen- No fevers, chills  CV- No chest pain, palpitations  Resp- No cough, dyspnea  GI- No N/V/D, abd pain    Otherwise ROS is negative except as mentioned in the HPI.    Objective   Objective     Vital Signs:   Temp:  [97.7 °F (36.5 °C)-98.9 °F (37.2 °C)] 97.7 °F (36.5 °C)  Heart Rate:  [66-82] 75  Resp:  [14-18] 16  BP: (125-179)/(50-90) 125/50        Physical Exam:  Constitutional -no acute distress, in bed  HEENT-NCAT, mucous membranes moist  CV-RRR, no murmur  Resp-CTAB  Abd-soft, non-tender, non-distended, normo active bowel sounds  Ext-No lower extremity cyanosis, clubbing or edema bilaterally  Neuro-alert and oriented, speech clear, moves all extremities   Psych-normal affect   Skin- No rash on exposed UE or LE bilaterally      Results Reviewed:  I have personally reviewed current lab, radiology, and data and agree.    Results from last 7 days   Lab Units  11/10/18   0546  11/10/18   0019   WBC 10*3/mm3  9.36  9.62   HEMOGLOBIN g/dL  11.4*  12.8   HEMATOCRIT %  34.8  39.4   PLATELETS 10*3/mm3  221  239     Results from last 7 days   Lab Units  18   0418  11/10/18   1152  11/10/18   0546  11/10/18   0019   SODIUM mmol/L  137   --   136  136   POTASSIUM mmol/L  4.0   --   3.5  3.7   CHLORIDE mmol/L  102   --   100  98*   CO2 mmol/L  31.0   --   27.0  30.0   BUN mg/dL  17   --   16  16   CREATININE mg/dL  0.77   --   0.62  0.75   GLUCOSE mg/dL  238*   --   203*  270*   CALCIUM mg/dL  8.3*   --   8.9  9.6   ALT (SGPT) U/L   --    --    --   30   AST (SGOT) U/L   --    --    --   52*   TROPONIN I ng/mL   --   11.376*  15.071*  11.854*     Estimated Creatinine Clearance: 45  mL/min (by C-G formula based on SCr of 0.77 mg/dL).  No results found for: BNP    Microbiology Results Abnormal     None          Imaging Results (last 24 hours)     ** No results found for the last 24 hours. **        Results for orders placed during the hospital encounter of 11/09/18   Adult Transthoracic Echo Complete W/ Cont if Necessary Per Protocol    Narrative · Left ventricular systolic function is normal. Estimated EF = 60%.  · The following left ventricular wall segments are hypokinetic: basal   inferolateral and mid inferolateral.  · Left ventricular diastolic dysfunction (grade I a) consistent with   impaired relaxation.          I have reviewed the medications.      amLODIPine 10 mg Oral Daily   aspirin 81 mg Oral Daily   atorvastatin 20 mg Oral Daily   budesonide 0.5 mg Nebulization BID - RT   carBAMazepine 100 mg Oral BID   carvedilol 3.125 mg Oral Q12H   chlorthalidone 25 mg Oral Daily   cholecalciferol 1,000 Units Oral Daily   cycloSPORINE 1 drop Both Eyes BID   diazePAM 2 mg Oral Nightly   enoxaparin 1 mg/kg Subcutaneous Q12H   ferrous sulfate 650 mg Oral Daily With Breakfast   fexofenadine 180 mg Oral Q12H   gabapentin 400 mg Oral 4x Daily   insulin detemir 10 Units Subcutaneous Q12H   insulin lispro 0-7 Units Subcutaneous 4x Daily With Meals & Nightly   losartan 100 mg Oral Daily   montelukast 10 mg Oral Nightly   Morphine 30 mg Oral Q12H   OCUVITE-LUTEIN 1 tablet Oral Daily   oxybutynin XL 5 mg Oral Daily   pantoprazole 40 mg Oral Q AM   sodium chloride 3 mL Intravenous Q12H   tiZANidine 4 mg Oral Nightly   vitamin B-12 1,000 mcg Oral Daily   ascorbic acid 1,000 mg Oral Daily         Assessment/Plan   Assessment / Plan     Active Hospital Problems    Diagnosis   • **NSTEMI (non-ST elevated myocardial infarction) (CMS/[facility])   • Hypertension     uncontrolled. I will add chlorthalidone 25 to her current regimen     • GERD (gastroesophageal reflux disease)   • Dyslipidemia     May 2012 - Total 156,  triglycerides 297, HDL 47, and LDL 54; on statin therapy      • Diabetes mellitus (CMS/[facility])     Hemoglobin A1c of 8.3.     • Coronary artery disease     post percutaneous coronary intervention and stenting with low normal left ventricular ejection fraction     • Cerebrovascular disease     a. TIA/CVA, September 2013.  b. Carotid CTA, September 2013:  60% to 70% left carotid bulb stenosis, severe right vertebral stenosis.     • Chest pain     c. On 01/10/2014:  NSTEMI with 3.0 x 15 Integrity BMS to ostium of circumflex per MGR.  Normal LVEF.  d. September 2015, admission to [facility] with ACS with negative enzymes and negative EKG.             Brief Hospital Course to date:  Fani Bird is a 82 y.o. female with h/o CAD and DMII presents after three days of chest pressure.       Assessment & Plan:    DMII  - a1c 8.3  - continue Increase basal insulin from 10 to 12 units BID, and add scheduled humalog 5 units TID meals.    NSTEMI  - LHC shows multivessel disease  - bypass grafting soon    Dyslipidemia  - LDL 86  -     HTN      DVT Prophylaxis:  lovenox    CODE STATUS:   Code Status and Medical Interventions:   Ordered at: 11/10/18 1124     Level Of Support Discussed With:    Patient     Code Status:    CPR     Medical Interventions (Level of Support Prior to Arrest):    Full       Disposition: I expect the patient to be discharged home in ? days.      Electronically signed by Júnior Bishop MD, 11/12/18, 5:18 PM.

## 2018-11-12 NOTE — PLAN OF CARE
Problem: Patient Care Overview  Goal: Plan of Care Review  Continuing workup for CABG, pt & family requesting to see Stacie & Virgilio. Stacie in clinic today unavailable. Per Dr Delacruz, will see Dr Cornejo. Pt A&O, NSR, up with assist. FSBS has been elevated today, no pain except head this am. No chest pain.

## 2018-11-12 NOTE — PAYOR COMM NOTE
"Alfreda Perez RN  Utilization Review  P: [phone]  F: [phone]    Updated Clinicals  Ref #: [id number]    Fani Bird (82 y.o. Female)     Date of Birth Social Security Number Address Home Phone MRN    1935  [address]  [facility] [place] [address] [phone] [id number]    Protestant Marital Status          Other        Admission Date Admission Type Admitting Provider Attending Provider Department, Room/Bed    11/9/18 Urgent Júnior Bishop MD Sloan, Walker E, MD [facility] 6A, N615/1    Discharge Date Discharge Disposition Discharge Destination                       Attending Provider:  Júnior Bishop MD    Allergies:  Isosorbide, Augmentin [Amoxicillin-pot Clavulanate]    Isolation:  None   Infection:  None   Code Status:  CPR    Ht:  154.9 cm (60.98\")   Wt:  57.6 kg (127 lb)    Admission Cmt:  None   Principal Problem:  NSTEMI (non-ST elevated myocardial infarction) (CMS/[facility]) [I21.4]                 Active Insurance as of 11/9/2018     Primary Coverage     Payor Plan Insurance Group Employer/Plan Group    HUMANA MEDICARE REPLACEMENT HUMANA MEDICARE REPL [id number]     Payor Plan Address Payor Plan Phone Number Payor Plan Fax Number Effective Dates    [address] [address] [phone]  1/1/2018 - None Entered    [facility] [address]       Subscriber Name Subscriber Birth Date Member ID       FANI BIRD 1935 [id number]                 Emergency Contacts      (Rel.) Home Phone Work Phone Mobile Phone    Yajaira Marie (Daughter) [phone] -- --            ICU Vital Signs     Row Name 11/12/18 1238 11/12/18 1110 11/12/18 0951 11/12/18 0710 11/12/18 0630       Vitals    Temp  --  98.9 °F (37.2 °C)  --  98.6 °F (37 °C)  --    Temp src  --  Oral  --  Oral  --    Pulse  --  81  73  66  --    Heart Rate Source  --  Monitor  Monitor  Monitor  --    Resp  --  16  16  16  --    Resp Rate Source  --  Visual  Visual  Visual  --    BP  140/90  179/88  --  " "141/66  --    BP Location  --  Right arm  --  Left arm  --    BP Method  --  Automatic  --  Automatic  --    Patient Position  --  Lying  --  Lying  --       Oxygen Therapy    Device (Oxygen Therapy)  --  --  room air  --  nasal cannula    Flow (L/min)  --  --  --  --  2    Row Name 11/12/18 0512 11/12/18 0456 11/12/18 0436 11/12/18 0220 11/11/18 2358       Height and Weight    Height  --  154.9 cm (60.98\")  --  --  --    Ideal Body Weight (IBW) (kg)  --  48.11  --  --  --    Weight in (lb) to have BMI = 25  --  132  --  --  --       Vitals    Temp  98.2 °F (36.8 °C)  --  --  --  --    Temp src  Oral  --  --  --  --    Pulse  77  --  --  --  --    Heart Rate Source  Monitor  --  --  --  --    Resp  18  --  --  --  --    Resp Rate Source  Visual  --  --  --  --    BP  171/72  --  --  --  --    Noninvasive MAP (mmHg)  143  --  --  --  --    BP Location  Right arm  --  --  --  --    BP Method  Automatic  --  --  --  --    Patient Position  Lying  --  --  --  --       Oxygen Therapy    SpO2  95 %  --  --  --  --    Pulse Oximetry Type  Continuous  --  --  --  --    Device (Oxygen Therapy)  nasal cannula  --  nasal cannula  nasal cannula  nasal cannula    Flow (L/min)  2  --  2  2  2    Row Name 11/11/18 2203 11/11/18 2107 11/11/18 2050 11/11/18 1908 11/11/18 1755       Vitals    Temp  --  --  --  98.9 °F (37.2 °C)  --    Temp src  --  --  --  Oral  --    Pulse  --  78  --  82  --    Heart Rate Source  --  --  --  Monitor  --    Resp  --  --  --  14  --    Resp Rate Source  --  --  --  Visual  --    BP  --  148/89  --  145/57  --    Noninvasive MAP (mmHg)  --  --  --  79  --    BP Location  --  --  --  Right arm  --    BP Method  --  --  --  Automatic  --    Patient Position  --  --  --  Lying  --       Oxygen Therapy    SpO2  --  --  --  94 %  --    Pulse Oximetry Type  --  --  --  Continuous  --    Device (Oxygen Therapy)  room air  --  room air  room air  room air    Row Name 11/11/18 1630 11/11/18 1524 11/11/18 1424 " 11/11/18 1201 11/11/18 1200       Vitals    Temp  --  98.2 °F (36.8 °C)  --  98 °F (36.7 °C)  --    Temp src  --  Oral  --  Oral  --    Pulse  --  75  --  76  --    Heart Rate Source  --  Monitor  --  Monitor  --    Resp  --  18  --  18  --    Resp Rate Source  --  Visual  --  Visual  --    BP  --  126/53  --  149/72  --    BP Location  --  Right arm  --  Right arm  --    BP Method  --  Automatic  --  Automatic  --    Patient Position  --  Lying  --  Lying  --       Oxygen Therapy    Device (Oxygen Therapy)  room air  room air  room air  room air  room air    Row Name 11/11/18 1045 11/11/18 1000 11/11/18 0924 11/11/18 0808 11/11/18 0806       Vitals    Pulse  72  --  76  --  68    Heart Rate Source  --  --  Monitor  --  --    Resp  --  --  16  --  --    Resp Rate Source  --  --  Visual  --  --    BP  136/58  --  --  --  165/71       Oxygen Therapy    Device (Oxygen Therapy)  --  room air  room air  room air  --    Row Name 11/11/18 0713 11/11/18 0600 11/11/18 0405 11/11/18 0336 11/11/18 0208       Vitals    Temp  98.2 °F (36.8 °C)  --  --  98.5 °F (36.9 °C)  --    Temp src  Oral  --  --  Oral  --    Pulse  71  --  --  67  --    Heart Rate Source  Monitor  --  --  Monitor  --    Resp  16  --  --  14  --    Resp Rate Source  Visual  --  --  Visual  --    BP  162/68  --  --  140/61  --    Noninvasive MAP (mmHg)  --  --  --  94  --    BP Location  Right arm  --  --  Right arm  --    BP Method  Automatic  --  --  Automatic  --    Patient Position  Lying  --  --  Lying  --       Oxygen Therapy    SpO2  94 %  --  --  95 %  --    Pulse Oximetry Type  Continuous  --  --  Continuous  --    Device (Oxygen Therapy)  nasal cannula  nasal cannula  nasal cannula  nasal cannula  nasal cannula    Flow (L/min)  2  2  2  2  2    Row Name 11/11/18 0000 11/10/18 2200 11/10/18 2053 11/10/18 2005 11/10/18 1916       Vitals    Temp  --  --  --  --  97.3 °F (36.3 °C)    Temp src  --  --  --  --  Oral    Pulse  --  --  77  --  74    Heart  Rate Source  --  --  Monitor  --  Monitor    Resp  --  --  16  --  16    Resp Rate Source  --  --  Visual  --  Visual    BP  --  --  --  --  146/69    Noninvasive MAP (mmHg)  --  --  --  --  106    BP Location  --  --  --  --  Right arm    BP Method  --  --  --  --  Automatic    Patient Position  --  --  --  --  Lying       Oxygen Therapy    SpO2  --  --  94 %  --  93 %    Pulse Oximetry Type  --  --  Continuous  --  Continuous    Device (Oxygen Therapy)  nasal cannula  room air  room air  room air  room air    Flow (L/min)  2  --  --  --  --    Row Name 11/10/18 1800 11/10/18 1620 11/10/18 1500 11/10/18 1400 11/10/18 1150       Vitals    Temp  --  --  98.6 °F (37 °C)  --  --    Temp src  --  --  Oral  --  --    Pulse  --  --  72  --  --    Heart Rate Source  --  --  Monitor  --  --    Resp  --  --  16  --  --    Resp Rate Source  --  --  Visual  --  --    BP  --  --  118/50  --  --    BP Location  --  --  Right arm  --  --    BP Method  --  --  Automatic  --  --    Patient Position  --  --  Lying  --  --       Oxygen Therapy    SpO2  --  --  96 %  --  --    Pulse Oximetry Type  --  --  Continuous  --  --    Device (Oxygen Therapy)  nasal cannula  nasal cannula  nasal cannula  nasal cannula  nasal cannula    Flow (L/min)  2  2  2  2  2    Row Name 11/10/18 1114 11/10/18 10:53:55 11/10/18 1039 11/10/18 1010 11/10/18 0915       Vitals    Temp  98.6 °F (37 °C)  --  --  --  --    Temp src  Oral  --  --  --  --    Pulse  69  70  72  --  76    Heart Rate Source  Monitor  --  --  --  Monitor    Resp  16 18 18  --  16    Resp Rate Source  Visual  --  --  --  Visual    BP  146/96  160/74  157/72  --  --    BP Location  Right arm  --  --  --  --    BP Method  Automatic  --  --  --  --    Patient Position  Lying  --  --  --  --       Oxygen Therapy    SpO2  98 %  90 %  99 %  --  --    Pulse Oximetry Type  Continuous  --  --  --  --    Device (Oxygen Therapy)  nasal cannula  --  --  nasal cannula  nasal cannula    Flow  (L/min)  2  --  --  2  2    ETCO2 (mmHg)  --  --  40 mmHg  --  --    Row Name 11/10/18 0900 11/10/18 0743 11/10/18 0730 11/10/18 0600 11/10/18 0400       Vitals    Temp  --  98.7 °F (37.1 °C)  --  --  --    Temp src  --  Oral  --  --  --    Pulse  --  75  --  --  --    Heart Rate Source  --  Monitor  --  --  --    Resp  --  16  --  --  --    Resp Rate Source  --  Visual  --  --  --    BP  --  124/66  --  --  --    BP Location  --  Right arm  --  --  --    BP Method  --  Automatic  --  --  --    Patient Position  --  Lying  --  --  --       Oxygen Therapy    Device (Oxygen Therapy)  nasal cannula  nasal cannula  nasal cannula  nasal cannula  nasal cannula    Flow (L/min)  2  2  2  2  2    Row Name 11/10/18 0300 11/10/18 0012 11/10/18 0009 11/09/18 2300          Height and Weight    Weight  --  --  --  57.6 kg (127 lb)     Weight Method  --  --  --  Standing scale        Vitals    Temp  97.5 °F (36.4 °C)  --  98.1 °F (36.7 °C)  --     Temp src  Oral  --  Oral  --     Pulse  91  94  95  --     Heart Rate Source  Monitor  Monitor  Monitor  --     Resp  16  --  14  --     Resp Rate Source  Visual  --  Visual  --     BP  139/107  (Abnormal)   149/73  173/81  --     Noninvasive MAP (mmHg)  --  120  99  --     BP Location  Right arm  Left arm  Right arm  --     BP Method  Automatic  Automatic  Automatic  --     Patient Position  Lying  Lying  Lying  --        Oxygen Therapy    SpO2  97 %  95 %  94 %  --     Pulse Oximetry Type  Continuous  --  Continuous  --     Device (Oxygen Therapy)  nasal cannula  --  room air  --     Flow (L/min)  2  --  --  --         Hospital Medications (all)       Dose Frequency Start End    acetaminophen (TYLENOL) tablet 650 mg 650 mg Every 4 Hours PRN 11/10/2018     Sig - Route: Take 2 tablets by mouth Every 4 (Four) Hours As Needed for Mild Pain . - Oral    albuterol (PROVENTIL) nebulizer solution 0.083% 2.5 mg/3mL 2.5 mg Every 4 Hours PRN 11/10/2018     Sig - Route: Take 2.5 mg by nebulization  Every 4 (Four) Hours As Needed for Shortness of Air. - Nebulization    amLODIPine (NORVASC) tablet 10 mg 10 mg Daily 11/10/2018     Sig - Route: Take 1 tablet by mouth Daily. - Oral    aspirin chewable tablet 81 mg 81 mg Daily 11/10/2018     Sig - Route: Chew 1 tablet Daily. - Oral    atorvastatin (LIPITOR) tablet 20 mg 20 mg Daily 11/10/2018     Sig - Route: Take 1 tablet by mouth Daily. - Oral    budesonide (PULMICORT) nebulizer solution 0.5 mg 0.5 mg 2 Times Daily - RT 11/10/2018     Sig - Route: Take 2 mL by nebulization 2 (Two) Times a Day. - Nebulization    carBAMazepine (TEGretol) tablet 100 mg 100 mg 2 Times Daily 11/10/2018     Sig - Route: Take 0.5 tablets by mouth 2 (Two) Times a Day. - Oral    carvedilol (COREG) tablet 3.125 mg 3.125 mg Every 12 Hours Scheduled 11/10/2018     Sig - Route: Take 1 tablet by mouth Every 12 (Twelve) Hours. - Oral    chlorthalidone (HYGROTON) tablet 25 mg 25 mg Daily 11/10/2018     Sig - Route: Take 1 tablet by mouth Daily. - Oral    cholecalciferol (VITAMIN D3) tablet 1,000 Units 1,000 Units Daily 11/10/2018     Sig - Route: Take 1 tablet by mouth Daily. - Oral    clopidogrel (PLAVIX) tablet 600 mg 600 mg Once 11/10/2018 11/10/2018    Sig - Route: Take 8 tablets by mouth 1 (One) Time. - Oral    cycloSPORINE (RESTASIS) 0.05 % ophthalmic emulsion 1 drop 1 drop 2 Times Daily 11/10/2018     Sig - Route: Administer 1 drop to both eyes 2 (Two) Times a Day. - Both Eyes    dextrose (D50W) 25 g/ 50mL Intravenous Solution 25 g 25 g Every 15 Minutes PRN 11/10/2018     Sig - Route: Infuse 50 mL into a venous catheter Every 15 (Fifteen) Minutes As Needed for Low Blood Sugar (Blood Sugar Less Than 70). - Intravenous    dextrose (GLUTOSE) oral gel 15 g 15 g Every 15 Minutes PRN 11/10/2018     Sig - Route: Take 15 g by mouth Every 15 (Fifteen) Minutes As Needed for Low Blood Sugar (Blood sugar less than 70). - Oral    diazePAM (VALIUM) tablet 2 mg 2 mg Nightly 11/10/2018     Sig - Route:  Take 1 tablet by mouth Every Night. - Oral    enoxaparin (LOVENOX) syringe 60 mg 1 mg/kg × 57.6 kg Every 12 Hours 11/10/2018     Sig - Route: Inject 0.6 mL under the skin into the appropriate area as directed Every 12 (Twelve) Hours. - Subcutaneous    ferrous sulfate tablet 650 mg 650 mg Daily With Breakfast 11/11/2018     Sig - Route: Take 2 tablets by mouth Daily With Breakfast. - Oral    fexofenadine (ALLEGRA) tablet 180 mg 180 mg Every 12 Hours 11/10/2018     Sig - Route: Take 1 tablet by mouth Every 12 (Twelve) Hours. - Oral    Notes to Pharmacy: Takes patient supply allegra    gabapentin (NEURONTIN) capsule 400 mg 400 mg 4 Times Daily 11/10/2018     Sig - Route: Take 1 capsule by mouth 4 (Four) Times a Day. - Oral    glucagon (human recombinant) (GLUCAGEN DIAGNOSTIC) injection 1 mg 1 mg As Needed 11/10/2018     Sig - Route: Inject 1 mg under the skin into the appropriate area as directed As Needed (Blood Glucose Less Than 70). - Subcutaneous    hydrALAZINE (APRESOLINE) tablet 25 mg 25 mg 3 Times Daily PRN 11/11/2018     Sig - Route: Take 1 tablet by mouth 3 (Three) Times a Day As Needed (SBP > 160mmHg). - Oral    insulin detemir (LEVEMIR) injection 10 Units 10 Units Every 12 Hours Scheduled 11/11/2018     Sig - Route: Inject 10 Units under the skin into the appropriate area as directed Every 12 (Twelve) Hours. - Subcutaneous    insulin lispro (humaLOG) injection 0-7 Units 0-7 Units 4 Times Daily With Meals & Nightly 11/10/2018     Sig - Route: Inject 0-7 Units under the skin into the appropriate area as directed 4 (Four) Times a Day With Meals & at Bedtime. - Subcutaneous    losartan (COZAAR) tablet 100 mg 100 mg Daily 11/10/2018     Sig - Route: Take 2 tablets by mouth Daily. - Oral    montelukast (SINGULAIR) tablet 10 mg 10 mg Nightly 11/10/2018     Sig - Route: Take 1 tablet by mouth Every Night. - Oral    Morphine (MS CONTIN) 12 hr tablet 30 mg 30 mg Every 12 Hours Scheduled 11/10/2018     Sig - Route:  "Take 1 tablet by mouth Every 12 (Twelve) Hours. - Oral    nitroglycerin (NITROSTAT) SL tablet 0.4 mg 0.4 mg Every 5 Minutes PRN 11/10/2018     Sig - Route: Place 1 tablet under the tongue Every 5 (Five) Minutes As Needed for Chest Pain. - Sublingual    nitroglycerin 50 mg/250 mL (0.2 mg/mL) infusion 10-50 mcg/min Titrated 11/10/2018     Sig - Route: Infuse 10-50 mcg/min into a venous catheter Dose Adjusted By Provider As Needed. - Intravenous    OCUVITE-LUTEIN (OCUVITE) tablet 1 tablet 1 tablet Daily 11/10/2018     Sig - Route: Take 1 tablet by mouth Daily. - Oral    ondansetron (ZOFRAN) injection 4 mg 4 mg Every 6 Hours PRN 11/10/2018     Sig - Route: Infuse 2 mL into a venous catheter Every 6 (Six) Hours As Needed for Nausea or Vomiting. - Intravenous    Linked Group 1:  \"Or\" Linked Group Details        ondansetron (ZOFRAN) tablet 4 mg 4 mg Every 6 Hours PRN 11/10/2018     Sig - Route: Take 1 tablet by mouth Every 6 (Six) Hours As Needed for Nausea or Vomiting. - Oral    Linked Group 1:  \"Or\" Linked Group Details        oxybutynin XL (DITROPAN-XL) 24 hr tablet 5 mg 5 mg Daily 11/10/2018     Sig - Route: Take 1 tablet by mouth Daily. - Oral    pantoprazole (PROTONIX) EC tablet 40 mg 40 mg Every Early Morning 11/10/2018     Sig - Route: Take 1 tablet by mouth Every Morning. - Oral    sodium chloride 0.9 % flush 3 mL 3 mL Every 12 Hours Scheduled 11/10/2018     Sig - Route: Infuse 3 mL into a venous catheter Every 12 (Twelve) Hours. - Intravenous    sodium chloride 0.9 % flush 3-10 mL 3-10 mL As Needed 11/10/2018     Sig - Route: Infuse 3-10 mL into a venous catheter As Needed for Line Care. - Intravenous    sodium chloride 0.9 % infusion 50 mL/hr Once 11/10/2018 11/10/2018    Sig - Route: Infuse 50 mL/hr into a venous catheter 1 (One) Time. - Intravenous    sodium chloride 0.9 % infusion 1-3 mL/kg/hr × 57.6 kg Continuous 11/10/2018     Sig - Route: Infuse 57.6-172.8 mL/hr into a venous catheter Continuous. - " Intravenous    tiZANidine (ZANAFLEX) tablet 4 mg 4 mg Nightly 11/10/2018     Sig - Route: Take 1 tablet by mouth Every Night. - Oral    vitamin B-12 (CYANOCOBALAMIN) tablet 1,000 mcg 1,000 mcg Daily 11/10/2018     Sig - Route: Take 1 tablet by mouth Daily. - Oral    vitamin C (ASCORBIC ACID) tablet 1,000 mg 1,000 mg Daily 11/10/2018     Sig - Route: Take 2 tablets by mouth Daily. - Oral    carBAMazepine (TEGretol) tablet 100 mg (Discontinued) 100 mg 2 Times Daily 11/10/2018 11/10/2018    Sig - Route: Take 0.5 tablets by mouth 2 (Two) Times a Day. - Oral    celecoxib (CeleBREX) capsule 200 mg (Discontinued) 200 mg Daily 11/10/2018 11/10/2018    Sig - Route: Take 1 capsule by mouth Daily. - Oral    cetirizine (zyrTEC) tablet 5 mg (Discontinued) 5 mg Daily 11/10/2018 11/10/2018    Sig - Route: Take 0.5 tablets by mouth Daily. - Oral    clopidogrel (PLAVIX) tablet 600 mg (Discontinued) 600 mg Once 11/10/2018 11/10/2018    Sig - Route: Take 8 tablets by mouth 1 (One) Time. - Oral    Reason for Discontinue: Duplicate order    fentaNYL citrate (PF) (SUBLIMAZE) injection (Discontinued)  As Needed 11/10/2018 11/10/2018    Sig: As Needed.    Reason for Discontinue: Patient Discharge    ferrous sulfate tablet 325 mg (Discontinued) 325 mg Daily With Breakfast 11/10/2018 11/10/2018    Sig - Route: Take 1 tablet by mouth Daily With Breakfast. - Oral    insulin detemir (LEVEMIR) injection 10 Units (Discontinued) 10 Units Every 12 Hours Scheduled 11/10/2018 11/10/2018    Sig - Route: Inject 10 Units under the skin into the appropriate area as directed Every 12 (Twelve) Hours. - Subcutaneous    insulin detemir (LEVEMIR) injection 7 Units (Discontinued) 7 Units Every 12 Hours Scheduled 11/10/2018 11/11/2018    Sig - Route: Inject 7 Units under the skin into the appropriate area as directed Every 12 (Twelve) Hours. - Subcutaneous    iopamidol (ISOVUE-370) 76 % injection (Discontinued)  As Needed 11/10/2018 11/10/2018    Sig: As  Needed.    Reason for Discontinue: Patient Discharge    lidocaine (XYLOCAINE) 1 % injection (Discontinued)  As Needed 11/10/2018 11/10/2018    Sig: As Needed.    Reason for Discontinue: Patient Discharge    midazolam (VERSED) injection (Discontinued)  As Needed 11/10/2018 11/10/2018    Sig: As Needed.    Reason for Discontinue: Patient Discharge    tiZANidine (ZANAFLEX) tablet 4 mg (Discontinued) 4 mg Daily 11/10/2018 11/10/2018    Sig - Route: Take 1 tablet by mouth Daily. - Oral          Lab Results (most recent)     Procedure Component Value Units Date/Time    POC Glucose Once [[id number]]  (Abnormal) Collected:  11/12/18 1110    Specimen:  Blood Updated:  11/12/18 1112     Glucose 335 mg/dL     POC Glucose Once [[id number]]  (Abnormal) Collected:  11/12/18 0722    Specimen:  Blood Updated:  11/12/18 0726     Glucose 254 mg/dL     POC Glucose Once [[id number]]  (Abnormal) Collected:  11/11/18 2026    Specimen:  Blood Updated:  11/11/18 2028     Glucose 322 mg/dL     POC Glucose Once [[id number]]  (Abnormal) Collected:  11/11/18 1621    Specimen:  Blood Updated:  11/11/18 1623     Glucose 253 mg/dL     POC Glucose Once [[id number]]  (Abnormal) Collected:  11/11/18 1106    Specimen:  Blood Updated:  11/11/18 1108     Glucose 272 mg/dL     P2Y12 Platelet Inhibition [[id number]] Collected:  11/11/18 0418    Specimen:  Blood Updated:  11/11/18 0739     P2Y12 Reactivity Unit 154 PRU     Narrative:       P2Y12 Interpretation:  Pre-Drug normal reference range is 194-418 PRU.  Test results are reported in P2Y12 reaction units (PRU). This measures the extent of platelet aggregation in the presence of P2Y12 inhibitor drugs, such as clopidogrel (Plavix), prasugrel (Effient), ticagrelor (Brilinta), ticlopidine (Ticlid).  P2Y12 values <194 PRU (low end of reference range) are specific evidence of a P2Y12 inhibitor effect.  Patients who have been treated with Glycoprotein IIb/IIIa inhibitors should not be tested until platelet  function has recovered. This time period is approximately 14 days after discontinuation of abciximab (ReoPro) and up to 48 hours after discontinuation of eptifibatide (Integrilin) and tirofiban (Aggrastat).   The P2Y12 test results should be interpreted in conjunction with other clinical and lab data available to the clinician.    POC Glucose Once [[id number]]  (Abnormal) Collected:  11/11/18 0637    Specimen:  Blood Updated:  11/11/18 0639     Glucose 222 mg/dL     Basic Metabolic Panel [[id number]]  (Abnormal) Collected:  11/11/18 0418    Specimen:  Blood Updated:  11/11/18 0618     Glucose 238 mg/dL      BUN 17 mg/dL      Creatinine 0.77 mg/dL      Sodium 137 mmol/L      Potassium 4.0 mmol/L      Chloride 102 mmol/L      CO2 31.0 mmol/L      Calcium 8.3 mg/dL      eGFR Non African Amer 72 mL/min/1.73      BUN/Creatinine Ratio 22.1     Anion Gap 4.0 mmol/L     Narrative:       National Kidney Foundation Guidelines    Stage     Description        GFR  1         Normal or High     90+  2         Mild decrease      60-89  3         Moderate decrease  30-59  4         Severe decrease    15-29  5         Kidney failure     <15    The MDRD GFR formula is only valid for adults with stable renal function between ages 18 and 70.    Lipid Panel [[id number]]  (Abnormal) Collected:  11/11/18 0418    Specimen:  Blood Updated:  11/11/18 0618     Total Cholesterol 137 mg/dL      Triglycerides 278 mg/dL      HDL Cholesterol 36 mg/dL      LDL Cholesterol  78 mg/dL     Narrative:       Cholesterol Reference Ranges:   Desirable       < 200 mg/dL   Borderline    200-239 mg/dL   High Risk       > 239 mg/dL    Triglyceride Reference Ranges:   Normal          < 150 mg/dL   Borderline    150-199 mg/dL   High          200-499 mg/dL   Very High       > 499 mg/dL    HDL Reference Ranges:   Low              < 40 mg/dL   High             > 59 mg/dL    LDL Reference Ranges:   Optimal         < 100 mg/dL   Near Optimal  100-129 mg/dL   Borderline     130-159 mg/dL   High          160-189 mg/dL   Very High       > 189 mg/dL    POC Glucose Once [[id number]]  (Abnormal) Collected:  11/11/18 0335    Specimen:  Blood Updated:  11/11/18 0337     Glucose 263 mg/dL     POC Glucose Once [[id number]]  (Abnormal) Collected:  11/11/18 0011    Specimen:  Blood Updated:  11/11/18 0013     Glucose 261 mg/dL     POC Glucose Once [[id number]]  (Abnormal) Collected:  11/10/18 2030    Specimen:  Blood Updated:  11/10/18 2031     Glucose 254 mg/dL     POC Glucose Once [[id number]]  (Abnormal) Collected:  11/10/18 1712    Specimen:  Blood Updated:  11/10/18 1714     Glucose 235 mg/dL     Troponin [[id number]]  (Abnormal) Collected:  11/10/18 1152    Specimen:  Blood Updated:  11/10/18 1245     Troponin I 11.376 ng/mL      Comment: Results may be falsely decreased if patient taking Biotin.       POC Glucose Once [[id number]]  (Abnormal) Collected:  11/10/18 1229    Specimen:  Blood Updated:  11/10/18 1231     Glucose 132 mg/dL     Hemoglobin A1c [[id number]]  (Abnormal) Collected:  11/10/18 0546    Specimen:  Blood Updated:  11/10/18 0838     Hemoglobin A1C 8.30 %     Narrative:       The American Diabetes Association recommends maintenance of Hemoglobin A1C at 7.0% or lower. Goals for Hemoglobin A1C reduction may need to be modified if hypoglycemia is a problem.    Troponin [[id number]]  (Abnormal) Collected:  11/10/18 0546    Specimen:  Blood Updated:  11/10/18 0723     Troponin I 15.071 ng/mL      Comment: Results may be falsely decreased if patient taking Biotin.       Basic Metabolic Panel [[id number]]  (Abnormal) Collected:  11/10/18 0546    Specimen:  Blood Updated:  11/10/18 0716     Glucose 203 mg/dL      BUN 16 mg/dL      Creatinine 0.62 mg/dL      Sodium 136 mmol/L      Potassium 3.5 mmol/L      Chloride 100 mmol/L      CO2 27.0 mmol/L      Calcium 8.9 mg/dL      eGFR Non African Amer 92 mL/min/1.73      BUN/Creatinine Ratio 25.8     Anion Gap 9.0 mmol/L     Narrative:        National Kidney Foundation Guidelines    Stage     Description        GFR  1         Normal or High     90+  2         Mild decrease      60-89  3         Moderate decrease  30-59  4         Severe decrease    15-29  5         Kidney failure     <15    The MDRD GFR formula is only valid for adults with stable renal function between ages 18 and 70.    Lipid Panel [[id number]]  (Abnormal) Collected:  11/10/18 0546    Specimen:  Blood Updated:  11/10/18 0716     Total Cholesterol 152 mg/dL      Triglycerides 379 mg/dL      HDL Cholesterol 37 mg/dL      LDL Cholesterol  86 mg/dL     Narrative:       Cholesterol Reference Ranges:   Desirable       < 200 mg/dL   Borderline    200-239 mg/dL   High Risk       > 239 mg/dL    Triglyceride Reference Ranges:   Normal          < 150 mg/dL   Borderline    150-199 mg/dL   High          200-499 mg/dL   Very High       > 499 mg/dL    HDL Reference Ranges:   Low              < 40 mg/dL   High             > 59 mg/dL    LDL Reference Ranges:   Optimal         < 100 mg/dL   Near Optimal  100-129 mg/dL   Borderline    130-159 mg/dL   High          160-189 mg/dL   Very High       > 189 mg/dL    CBC Auto Differential [[id number]]  (Abnormal) Collected:  11/10/18 0546    Specimen:  Blood Updated:  11/10/18 0621     WBC 9.36 10*3/mm3      RBC 3.86 10*6/mm3      Hemoglobin 11.4 g/dL      Hematocrit 34.8 %      MCV 90.2 fL      MCH 29.5 pg      MCHC 32.8 g/dL      RDW 12.6 %      RDW-SD 41.0 fl      MPV 11.2 fL      Platelets 221 10*3/mm3      Neutrophil % 65.4 %      Lymphocyte % 24.6 %      Monocyte % 6.2 %      Eosinophil % 3.5 %      Basophil % 0.3 %      Immature Grans % 0.3 %      Neutrophils, Absolute 6.12 10*3/mm3      Lymphocytes, Absolute 2.30 10*3/mm3      Monocytes, Absolute 0.58 10*3/mm3      Eosinophils, Absolute 0.33 10*3/mm3      Basophils, Absolute 0.03 10*3/mm3      Immature Grans, Absolute 0.03 10*3/mm3     Troponin [[id number]]  (Abnormal) Collected:  11/10/18 0019    Specimen:   Blood Updated:  11/10/18 0413     Troponin I 11.854 ng/mL      Comment: Results may be falsely decreased if patient taking Biotin.       Comprehensive Metabolic Panel [[id number]]  (Abnormal) Collected:  11/10/18 0019    Specimen:  Blood Updated:  11/10/18 0350     Glucose 270 mg/dL      BUN 16 mg/dL      Creatinine 0.75 mg/dL      Sodium 136 mmol/L      Potassium 3.7 mmol/L      Chloride 98 mmol/L      CO2 30.0 mmol/L      Calcium 9.6 mg/dL      Total Protein 6.9 g/dL      Albumin 4.31 g/dL      ALT (SGPT) 30 U/L      AST (SGOT) 52 U/L      Alkaline Phosphatase 74 U/L      Total Bilirubin 0.2 mg/dL      eGFR Non African Amer 74 mL/min/1.73      Globulin 2.6 gm/dL      A/G Ratio 1.7 g/dL      BUN/Creatinine Ratio 21.3     Anion Gap 8.0 mmol/L     Narrative:       [employer] Guidelines    Stage     Description        GFR  1         Normal or High     90+  2         Mild decrease      60-89  3         Moderate decrease  30-59  4         Severe decrease    15-29  5         Kidney failure     <15    The MDRD GFR formula is only valid for adults with stable renal function between ages 18 and 70.    CBC & Differential [[id number]] Collected:  11/10/18 0019    Specimen:  Blood Updated:  11/10/18 0048    Narrative:       The following orders were created for panel order CBC & Differential.  Procedure                               Abnormality         Status                     ---------                               -----------         ------                     CBC Auto Differential[[id number]]         Abnormal            Final result                 Please view results for these tests on the individual orders.    CBC Auto Differential [[id number]]  (Abnormal) Collected:  11/10/18 0019    Specimen:  Blood Updated:  11/10/18 0048     WBC 9.62 10*3/mm3      RBC 4.25 10*6/mm3      Hemoglobin 12.8 g/dL      Hematocrit 39.4 %      MCV 92.7 fL      MCH 30.1 pg      MCHC 32.5 g/dL      RDW 12.4 %      RDW-SD 42.3 fl       MPV 11.6 fL      Platelets 239 10*3/mm3      Neutrophil % 63.9 %      Lymphocyte % 26.2 %      Monocyte % 6.1 %      Eosinophil % 3.1 %      Basophil % 0.4 %      Immature Grans % 0.3 %      Neutrophils, Absolute 6.14 10*3/mm3      Lymphocytes, Absolute 2.52 10*3/mm3      Monocytes, Absolute 0.59 10*3/mm3      Eosinophils, Absolute 0.30 10*3/mm3      Basophils, Absolute 0.04 10*3/mm3      Immature Grans, Absolute 0.03 10*3/mm3     POC Glucose Once [[id number]]  (Abnormal) Collected:  11/10/18 0028    Specimen:  Blood Updated:  11/10/18 0030     Glucose 279 mg/dL     Narrative:       Meter: [id number] : [id number]          Imaging Results (last 72 hours)     ** No results found for the last 72 hours. **        ECG/EMG Results (last 72 hours)     Procedure Component Value Units Date/Time    Adult Transthoracic Echo Complete W/ Cont if Necessary Per Protocol [[id number]] Collected:  11/10/18 1356     Updated:  11/10/18 2028     BSA 1.6 m^2      IVSd 1.2 cm      LVIDd 3.4 cm      LVIDs 2.0 cm      LVPWd 1.2 cm      IVS/LVPW 0.99     FS 41.6 %      EDV(Teich) 46.0 ml      ESV(Teich) 12.1 ml      EF(Teich) 73.7 %      EDV(cubed) 37.9 ml      ESV(cubed) 7.5 ml      EF(cubed) 80.1 %      LV mass(C)d 123.2 grams      LV mass(C)dI 79.1 grams/m^2      SV(Teich) 33.9 ml      SI(Teich) 21.8 ml/m^2      SV(cubed) 30.3 ml      SI(cubed) 19.5 ml/m^2      Ao root diam 2.8 cm      Ao root area 6.3 cm^2      LA dimension 3.6 cm      LA/Ao 1.3     LAd major 5.2 cm      Ao root area (BSA corrected) 1.8     LA Volume Index 23.8 ml/m^2      MV E max meka 94.8 cm/sec      MV A max meka 121.9 cm/sec      MV E/A 0.78     MV P1/2t max meka 88.0 cm/sec      MV P1/2t 85.6 msec      MVA(P1/2t) 2.6 cm^2      MV dec slope 301.1 cm/sec^2      MV dec time 0.28 sec      PA acc slope 672.9 cm/sec^2      PA acc time 0.12 sec      RV V1 max PG 2.2 mmHg      RV V1 max 73.5 cm/sec      PA pr(Accel) 25.5 mmHg      MVA P1/2T LCG 2.5 cm^2       Lat E/e'  13.0     Med E/e' 16.8     Lat Peak E' Neil 7.2 cm/sec      Med Peak E' Neil 5.5 cm/sec       CV ECHO JUDE - BZI_BMI 24.0 kilograms/m^2       CV ECHO JUDE - BSA(HAYCOCK) 1.6 m^2       CV ECHO JUDE - BZI_METRIC_WEIGHT 57.6 kg       CV ECHO JUDE - BZI_METRIC_HEIGHT 154.9 cm      Avg E/e' ratio 14.93     TDI S' 10.60 cm/sec      RV Base 2.70 cm      RV Length 6.10 cm      RV Mid 2.30 cm      LA volume 37.0 cm3      TAPSE (>1.6) 2.30 cm2      Echo EF Estimated 60 %     Narrative:       · Left ventricular systolic function is normal. Estimated EF = 60%.  · The following left ventricular wall segments are hypokinetic: basal   inferolateral and mid inferolateral.  · Left ventricular diastolic dysfunction (grade I a) consistent with   impaired relaxation.       ECG 12 Lead [[id number]] Collected:  11/10/18 0204     Updated:  11/12/18 0922    Narrative:       Test Reason : chest pain  Blood Pressure : **/** mmHG  Vent. Rate : 093 BPM     Atrial Rate : 093 BPM     P-R Int : 200 ms          QRS Dur : 092 ms      QT Int : 372 ms       P-R-T Axes : 065 -50 098 degrees     QTc Int : 462 ms    Normal sinus rhythm  Left axis deviation  Nonspecific ST and T wave abnormality  Abnormal ECG  When compared with ECG of 14-MAY-2018 15:26,  Criteria for Septal infarct are no longer present  ST now depressed in Lateral leads  Inverted T waves have replaced nonspecific T wave abnormality in Lateral  leads  Confirmed by GAUDENCIO PLUNKETT MD ([id number]) on 11/12/2018 9:22:02 AM    Referred By:  STEPHY           Confirmed By:GAUDENCIO PLUNKETT MD             Physician Progress Notes (last 72 hours) (Notes from 11/9/2018  1:12 PM through 11/12/2018  1:12 PM)      Pankaj Rg PA at 11/12/2018  8:09 AM            CTS Progress Note      Chief Complaint: Some shortness of breath      Subjective  Up in bed.  Conversant.  Denies chest pain or shortness of air currently      Objective    Physical Exam:   Vital Signs   Temp:  [98 °F (36.7 °C)-98.9 °F  (37.2 °C)] 98.2 °F (36.8 °C)  Heart Rate:  [72-82] 77  Resp:  [14-18] 18  BP: (126-171)/(53-89) 171/72   GEN: NAD   CV: Regular rate and rhythm    RESP: deCreased throughout but clear to auscultation   EXT: Warm no edema    Results     Results from last 7 days   Lab Units  11/10/18   0546   WBC 10*3/mm3  9.36   HEMOGLOBIN g/dL  11.4*   HEMATOCRIT %  34.8   PLATELETS 10*3/mm3  221     Results from last 7 days   Lab Units  18   0418   SODIUM mmol/L  137   POTASSIUM mmol/L  4.0   CHLORIDE mmol/L  102   CO2 mmol/L  31.0   BUN mg/dL  17   CREATININE mg/dL  0.77   GLUCOSE mg/dL  238*   CALCIUM mg/dL  8.3*             Assessment/Plan       NSTEMI (non-ST elevated myocardial infarction) (CMS/[facility])    Chest pain    Cerebrovascular disease    Coronary artery disease    Hypertension    Dyslipidemia    GERD (gastroesophageal reflux disease)    Diabetes mellitus (CMS/[facility])        Plan   Continue preop workup for tentative CABG next week  Patient and family requesting to speak with Dr. Cornejo and Dr. Angi SCHMITZ. YURIY Rg  18  8:09 AM                    Electronically signed by Pankaj Rg PA at 2018  9:02 AM     Júnior Bishop MD at 2018  4:26 PM              [facility] Medicine Services  PROGRESS NOTE    Patient Name: Fani Bird  : 1935  MRN: [id number]    Date of Admission: 2018  Length of Stay: 1  Primary Care Physician: Emerson Alvarez MD    Subjective   Subjective     CC:  DMII    HPI:  No chest pain or dyspnea    Review of Systems  Gen- No fevers, chills  CV- No chest pain, palpitations  Resp- No cough, dyspnea  GI- No N/V/D, abd pain    Otherwise ROS is negative except as mentioned in the HPI.    Objective   Objective     Vital Signs:   Temp:  [97.3 °F (36.3 °C)-98.5 °F (36.9 °C)] 98.2 °F (36.8 °C)  Heart Rate:  [67-77] 75  Resp:  [14-18] 18  BP: (126-165)/(53-72) 126/53        Physical Exam:  Constitutional -no acute distress, in  bed  HEENT-NCAT, mucous membranes moist  CV-RRR, no murmur  Resp-CTAB  Abd-soft, non-tender, non-distended, normo active bowel sounds  Ext-No lower extremity cyanosis, clubbing or edema bilaterally  Neuro-alert and oriented, speech clear, moves all extremities   Psych-normal affect   Skin- No rash on exposed UE or LE bilaterally      Results Reviewed:  I have personally reviewed current lab, radiology, and data and agree.    Results from last 7 days   Lab Units  11/10/18   0546  11/10/18   0019   WBC 10*3/mm3  9.36  9.62   HEMOGLOBIN g/dL  11.4*  12.8   HEMATOCRIT %  34.8  39.4   PLATELETS 10*3/mm3  221  239     Results from last 7 days   Lab Units  11/11/18   0418  11/10/18   1152  11/10/18   0546  11/10/18   0019   SODIUM mmol/L  137   --   136  136   POTASSIUM mmol/L  4.0   --   3.5  3.7   CHLORIDE mmol/L  102   --   100  98*   CO2 mmol/L  31.0   --   27.0  30.0   BUN mg/dL  17   --   16  16   CREATININE mg/dL  0.77   --   0.62  0.75   GLUCOSE mg/dL  238*   --   203*  270*   CALCIUM mg/dL  8.3*   --   8.9  9.6   ALT (SGPT) U/L   --    --    --   30   AST (SGOT) U/L   --    --    --   52*   TROPONIN I ng/mL   --   11.376*  15.071*  11.854*     Estimated Creatinine Clearance: 45 mL/min (by C-G formula based on SCr of 0.77 mg/dL).  No results found for: BNP    Microbiology Results Abnormal     None          Imaging Results (last 24 hours)     ** No results found for the last 24 hours. **        Results for orders placed during the hospital encounter of 11/09/18   Adult Transthoracic Echo Complete W/ Cont if Necessary Per Protocol    Narrative · Left ventricular systolic function is normal. Estimated EF = 60%.  · The following left ventricular wall segments are hypokinetic: basal   inferolateral and mid inferolateral.  · Left ventricular diastolic dysfunction (grade I a) consistent with   impaired relaxation.          I have reviewed the medications.      amLODIPine 10 mg Oral Daily   aspirin 81 mg Oral Daily    atorvastatin 20 mg Oral Daily   budesonide 0.5 mg Nebulization BID - RT   carBAMazepine 100 mg Oral BID   carvedilol 3.125 mg Oral Q12H   chlorthalidone 25 mg Oral Daily   cholecalciferol 1,000 Units Oral Daily   cycloSPORINE 1 drop Both Eyes BID   diazePAM 2 mg Oral Nightly   enoxaparin 1 mg/kg Subcutaneous Q12H   ferrous sulfate 650 mg Oral Daily With Breakfast   fexofenadine 180 mg Oral Q12H   gabapentin 400 mg Oral 4x Daily   insulin detemir 7 Units Subcutaneous Q12H   insulin lispro 0-7 Units Subcutaneous 4x Daily With Meals & Nightly   losartan 100 mg Oral Daily   montelukast 10 mg Oral Nightly   Morphine 30 mg Oral Q12H   OCUVITE-LUTEIN 1 tablet Oral Daily   oxybutynin XL 5 mg Oral Daily   pantoprazole 40 mg Oral Q AM   sodium chloride 3 mL Intravenous Q12H   tiZANidine 4 mg Oral Nightly   vitamin B-12 1,000 mcg Oral Daily   ascorbic acid 1,000 mg Oral Daily         Assessment/Plan   Assessment / Plan     Active Hospital Problems    Diagnosis   • **NSTEMI (non-ST elevated myocardial infarction) (CMS/[facility])   • Hypertension     uncontrolled. I will add chlorthalidone 25 to her current regimen     • GERD (gastroesophageal reflux disease)   • Dyslipidemia     May 2012 - Total 156, triglycerides 297, HDL 47, and LDL 54; on statin therapy      • Diabetes mellitus (CMS/[facility])     Hemoglobin A1c of 8.3.     • Coronary artery disease     post percutaneous coronary intervention and stenting with low normal left ventricular ejection fraction     • Cerebrovascular disease     a. TIA/CVA, September 2013.  b. Carotid CTA, September 2013:  60% to 70% left carotid bulb stenosis, severe right vertebral stenosis.     • Chest pain     c. On 01/10/2014:  NSTEMI with 3.0 x 15 Integrity BMS to ostium of circumflex per MGR.  Normal LVEF.  d. September 2015, admission to [facility] with ACS with negative enzymes and negative EKG.             Brief Hospital Course to date:  Fani Bird is a 82 y.o. female with  h/o CAD and DMII presents after three days of chest pressure.       Assessment & Plan:    DMII  - a1c 8.3  - continue Increase basal insulin from 7 to 10 units BID    NSTEMI  - [facility] shows multivessel disease  - bypass grafting next week.    Dyslipidemia  - LDL 86  -     HTN      DVT Prophylaxis:  lovenox    CODE STATUS:   Code Status and Medical Interventions:   Ordered at: 11/10/18 1124     Level Of Support Discussed With:    Patient     Code Status:    CPR     Medical Interventions (Level of Support Prior to Arrest):    Full       Disposition: I expect the patient to be discharged home in ? days.      Electronically signed by Júnior Bishop MD, 11/11/18, 4:26 PM.        Electronically signed by Júnior Bishop MD at 11/11/2018  4:29 PM     Srinivasa Delacruz MD at 11/11/2018 12:24 PM          Fani Tobar Marge  [id number]  1935      CC: I feel pretty good idea remember talking to you yesterday    Subjective:  82-year-old  female with a documented past history of coronary artery disease and a previous cerebrovascular accident.  For the past 2-3 weeks she has had intermittent episodes of chest pain.  Cardiac catheterization revealed a tight left main coronary artery stenosis.  The patient is being prepared for coronary artery bypass surgery next week.      NSTEMI (non-ST elevated myocardial infarction) (CMS/HCC)    Chest pain    Cerebrovascular disease    Coronary artery disease    Hypertension    Dyslipidemia    GERD (gastroesophageal reflux disease)    Diabetes mellitus (CMS/HCC)      Objective:    Vital Signs:  Temp:  [97.3 °F (36.3 °C)-98.6 °F (37 °C)] 98 °F (36.7 °C)  Heart Rate:  [67-77] 76  Resp:  [14-18] 18  BP: (118-165)/(50-72) 149/72    Physical Exam:   General Appearance: alert, appears stated age    Lungs: clear to auscultation    Heart: regular rhythm & normal rate, normal S1, S2,no murmur    Skin:warm and dry        Results from last 7 days   Lab Units  11/10/18   0546   WBC  10*3/mm3  9.36   HEMOGLOBIN g/dL  11.4*   HEMATOCRIT %  34.8   PLATELETS 10*3/mm3  221     Results from last 7 days   Lab Units  11/11/18   0418   SODIUM mmol/L  137   POTASSIUM mmol/L  4.0   CHLORIDE mmol/L  102   CO2 mmol/L  31.0   BUN mg/dL  17   CREATININE mg/dL  0.77   GLUCOSE mg/dL  238*   CALCIUM mg/dL  8.3*       Imaging Results (last 24 hours)     ** No results found for the last 24 hours. **           Diagnosis:  CAD with subsequent NSTEMI and shortness of breath.  Patient has previously had a cerebrovascular accident and has limited mobility secondary to theCVA.      Plan   Schedule CABG next week  I have reviewed, verified, and confirmed the above history and current status.  I have examined the patient and confirmed the above physical findings.    Srinivasa Delacruz MD  11/11/18  12:24 PM    Electronically signed by Srinivasa Delacruz MD at 11/11/2018 12:27 PM     Bethel Mercedes MD at 11/11/2018  8:51 AM          [facility]    Inpatient Progress Note      Chief Complaint/Reason for service:    · Follow-up NSTEMI    Subjective     Subjective:       Patient up resting in bed. Denies any chest pain or shortness of breath. Patient is anxious about upcoming CABG with Dr. Delacruz.     Past medical, surgical, social and family history reviewed in the patient's electronic medical record.    Review of Systems:   Positive for Anxiety.  Negative for exertional chest pain, dyspnea with exertion, lower extremity edema, palpitations.    Problem List  Active Hospital Problems    Diagnosis Date Noted   • **NSTEMI (non-ST elevated myocardial infarction) (CMS/HCC) [I21.4] 11/10/2018   • Hypertension [I10]    • GERD (gastroesophageal reflux disease) [K21.9]    • Dyslipidemia [E78.5]    • Diabetes mellitus (CMS/[facility]) [E11.9]    • Coronary artery disease [I25.10]    • Cerebrovascular disease [I67.9]    • Chest pain [R07.9]       Resolved Hospital Problems   No resolved problems to display.        Objective     Objective:      Current Facility-Administered Medications:   •  acetaminophen (TYLENOL) tablet 650 mg, 650 mg, Oral, Q4H PRN, Patsy, Katarzyna G, DO, 650 mg at 11/10/18 0649  •  albuterol (PROVENTIL) nebulizer solution 0.083% 2.5 mg/3mL, 2.5 mg, Nebulization, Q4H PRN, Patsy, Katarzyna G, DO, 2.5 mg at 11/10/18 2053  •  amLODIPine (NORVASC) tablet 10 mg, 10 mg, Oral, Daily, Patsy, Katarzyna G, DO, 10 mg at 11/11/18 0808  •  aspirin chewable tablet 81 mg, 81 mg, Oral, Daily, Patsy, Katarzyna G, DO, 81 mg at 11/11/18 0808  •  atorvastatin (LIPITOR) tablet 20 mg, 20 mg, Oral, Daily, Patsy, Katarzyna G, DO, 20 mg at 11/11/18 0807  •  budesonide (PULMICORT) nebulizer solution 0.5 mg, 0.5 mg, Nebulization, BID - RT, Patsy, Katarzyna G, DO, 0.5 mg at 11/10/18 2053  •  carBAMazepine (TEGretol) tablet 100 mg, 100 mg, Oral, BID, Roxane, Ayde, APRN, 100 mg at 11/11/18 0808  •  carvedilol (COREG) tablet 3.125 mg, 3.125 mg, Oral, Q12H, Patsy, Katarzyna G, DO, 3.125 mg at 11/11/18 0807  •  chlorthalidone (HYGROTON) tablet 25 mg, 25 mg, Oral, Daily, Patsy, Katarzyna G, DO, 25 mg at 11/11/18 0808  •  cholecalciferol (VITAMIN D3) tablet 1,000 Units, 1,000 Units, Oral, Daily, Patsy, Katarzyna G, DO, 1,000 Units at 11/11/18 0808  •  cycloSPORINE (RESTASIS) 0.05 % ophthalmic emulsion 1 drop, 1 drop, Both Eyes, BID, Patsy, Katarzyna G, DO, 1 drop at 11/10/18 2040  •  dextrose (D50W) 25 g/ 50mL Intravenous Solution 25 g, 25 g, Intravenous, Q15 Min PRN, PatsyKate urrutiasie G, DO  •  dextrose (GLUTOSE) oral gel 15 g, 15 g, Oral, Q15 Min PRN, Patsy Katarzyna G, DO  •  diazePAM (VALIUM) tablet 2 mg, 2 mg, Oral, Nightly, PatsyMaria Eugenia urrutiae G, DO, 2 mg at 11/10/18 2031  •  enoxaparin (LOVENOX) syringe 60 mg, 1 mg/kg, Subcutaneous, Q12H, Bethel Mercedes MD, 60 mg at 11/11/18 0808  •  ferrous sulfate tablet 650 mg, 650 mg, Oral, Daily With Breakfast, Júnior Bishop MD, 650 mg at 11/11/18 0807  •  fexofenadine (ALLEGRA) tablet 180 mg, 180 mg, Oral, Q12H, Dario  Júnior BARLOW MD, 180 mg at 11/11/18 0704  •  gabapentin (NEURONTIN) capsule 400 mg, 400 mg, Oral, 4x Daily, Patsy, Katarzyna G, DO, 400 mg at 11/11/18 0700  •  glucagon (human recombinant) (GLUCAGEN DIAGNOSTIC) injection 1 mg, 1 mg, Subcutaneous, PRN, Patsy, Katarzyna G, DO  •  insulin detemir (LEVEMIR) injection 7 Units, 7 Units, Subcutaneous, Q12H, Júnior Bishop MD, 7 Units at 11/11/18 0817  •  insulin lispro (humaLOG) injection 0-7 Units, 0-7 Units, Subcutaneous, 4x Daily With Meals & Nightly, Patsy, Katarzyna G, DO, 3 Units at 11/11/18 0814  •  losartan (COZAAR) tablet 100 mg, 100 mg, Oral, Daily, Patsy, Katarzyna G, DO, 100 mg at 11/11/18 0807  •  montelukast (SINGULAIR) tablet 10 mg, 10 mg, Oral, Nightly, Patsy, Katarzyna G, DO, 10 mg at 11/10/18 2031  •  Morphine (MS CONTIN) 12 hr tablet 30 mg, 30 mg, Oral, Q12H, Patsy, Katarzyna G, DO, 30 mg at 11/11/18 0806  •  nitroglycerin (NITROSTAT) SL tablet 0.4 mg, 0.4 mg, Sublingual, Q5 Min PRN, Patsy, Katarzyna G, DO  •  nitroglycerin 50 mg/250 mL (0.2 mg/mL) infusion, 10-50 mcg/min, Intravenous, Titrated, Bethel Mercedes MD, Stopped at 11/10/18 1625  •  OCUVITE-LUTEIN (OCUVITE) tablet 1 tablet, 1 tablet, Oral, Daily, Pasty, Katarzyna G, DO, 1 tablet at 11/11/18 0807  •  ondansetron (ZOFRAN) tablet 4 mg, 4 mg, Oral, Q6H PRN **OR** ondansetron (ZOFRAN) injection 4 mg, 4 mg, Intravenous, Q6H PRN, Patsy, Katarzyna G, DO, 4 mg at 11/10/18 0717  •  oxybutynin XL (DITROPAN-XL) 24 hr tablet 5 mg, 5 mg, Oral, Daily, Patsy, Katarzyna G, DO, 5 mg at 11/11/18 0807  •  pantoprazole (PROTONIX) EC tablet 40 mg, 40 mg, Oral, Q AM, Patsy, Katarzyna G, DO, 40 mg at 11/11/18 0700  •  sodium chloride 0.9 % flush 3 mL, 3 mL, Intravenous, Q12H, Patsy, Katarzyna G, DO, 3 mL at 11/10/18 2040  •  sodium chloride 0.9 % flush 3-10 mL, 3-10 mL, Intravenous, PRN, Patsy, Katarzyna G, DO  •  sodium chloride 0.9 % infusion, 1-3 mL/kg/hr, Intravenous, Continuous, Bethel Mercedes MD, Stopped at 11/10/18 1316  •  tiZANidine (ZANAFLEX)  tablet 4 mg, 4 mg, Oral, Nightly, Roxane, Ayde, APRN, 4 mg at 11/10/18 2031  •  vitamin B-12 (CYANOCOBALAMIN) tablet 1,000 mcg, 1,000 mcg, Oral, Daily, Patsy, Katarzyna G, DO, 1,000 mcg at 11/11/18 0806  •  vitamin C (ASCORBIC ACID) tablet 1,000 mg, 1,000 mg, Oral, Daily, Patsy, Katarzyna G, DO, 1,000 mg at 11/11/18 0807    Vital Sign Min/Max for last 24 hours  Temp  Min: 97.3 °F (36.3 °C)  Max: 98.6 °F (37 °C)   BP  Min: 118/50  Max: 165/71   Pulse  Min: 67  Max: 77   Resp  Min: 14  Max: 18   SpO2  Min: 90 %  Max: 99 %   No Data Recorded      Intake/Output Summary (Last 24 hours) at 11/11/2018 0851  Last data filed at 11/11/2018 0713  Gross per 24 hour   Intake 720 ml   Output 2250 ml   Net -1530 ml           CONSTITUTIONAL: No acute distress, normal affect  RESPIRATORY: Normal effort. Clear to auscultation bilaterally without wheezing or rales  CARDIOVASCULAR: Regular rate and rhythm with normal S1 and S2. Without murmur, gallop or rub.  PERIPHERAL VASCULAR: Normal radial pulses bilaterally. There is no significant peripheral edema bilaterally.    Results Review:   Lab Results   Component Value Date    TROPONINI 11.376 (C) 11/10/2018       BUN   Date Value Ref Range Status   11/11/2018 17 9 - 23 mg/dL Final     Creatinine   Date Value Ref Range Status   11/11/2018 0.77 0.60 - 1.30 mg/dL Final     Potassium   Date Value Ref Range Status   11/11/2018 4.0 3.5 - 5.5 mmol/L Final     ALT (SGPT)   Date Value Ref Range Status   11/10/2018 30 7 - 40 U/L Final     AST (SGOT)   Date Value Ref Range Status   11/10/2018 52 (H) 0 - 33 U/L Final       Lab Results   Component Value Date    CHOL 137 11/11/2018    CHOL 152 11/10/2018     Lab Results   Component Value Date    TRIG 278 (H) 11/11/2018    TRIG 379 (H) 11/10/2018     Lab Results   Component Value Date    HDL 36 (L) 11/11/2018    HDL 37 (L) 11/10/2018     No components found for: LDLCALC  No results found for: VLDL  No results found for: LDLHDL    Tele:  NSR    TTE  11/10/2018  · Left ventricular systolic function is normal. Estimated EF = 60%.  · The following left ventricular wall segments are hypokinetic: basal inferolateral and mid inferolateral.  · Left ventricular diastolic dysfunction (grade I a) consistent with impaired relaxation.    [facility] 11/10/2018  · There is severe distal left main as well as ostial LAD and ostial circumflex stenosis.  · There is a 40% hazy, calcified distal left main stenosis at the bifurcation of the LAD and circumflex arteries.  There is a 60% hazy, calcified ostial LAD stenosis.  There is a 95% in-stent restenosis in a previously placed stent in the ostial to proximal circumflex artery.  · Normal left ventricular ejection fraction with no regional wall motion abnormalities    Assessment     Assessment/Plan:     ASSESSMENT:  -NSTEMI, troponin 11.854 on arrival, now 15.071. Currently pain free with nitroglycerin. Last ischemic evaluation 1/2014 with placement of bare metal stent. Cardiac catheterization revealed severe distal left, ostial LAD, and ostial circumflex stenosis.  -CAD, s/p BMS 1/2014, ASA and statin at home. Follows with Dr. Whitley as an outpatient.   -Hypertension, on multiple antihypertensives as an outpatient.   -Hyperlipidemia, atorvastatin, LDL 86   -Diabtetes Mellitus    PLAN:  -Tentative CABG x2 with Dr. Delacruz next week.   -Continue current CV meds.  -Added PRN hydralazine for SBP > 160mmHg    KERI Joe obtained past medical, family history, social history, review of systems and functioned as a scribe for the remainder of the dictation for Dr. Mercedes.    Electronically signed by KERI Beyer, 11/11/18, 9:06 AM.      11/11/2018    I, Bethel Mercedes MD, personally performed the services as scribed by the above named individual. I have made any necessary edits and it is both accurate and complete.     Bethel Mercedes MD, MSc, [facility]  Interventional Cardiology  [facility]  [facility]      Electronically signed by Bethel Mercedes MD at 2018  9:41 AM     Júnior Bishop MD at 11/10/2018  4:05 PM              [facility] Medicine Services  PROGRESS NOTE    Patient Name: Fani Bird  : 1935  MRN: [id number]    Date of Admission: 2018  Length of Stay: 0  Primary Care Physician: Emerson Alvarez MD    Subjective   Subjective     CC:  DMII    HPI:  Denies chest pain or dyspnea. Some anxiety over prospect of possible surgery.    Review of Systems  Gen- No fevers, chills  CV- No chest pain, palpitations  Resp- No cough, dyspnea  GI- No N/V/D, abd pain    Otherwise ROS is negative except as mentioned in the HPI.    Objective   Objective     Vital Signs:   Temp:  [97.5 °F (36.4 °C)-98.7 °F (37.1 °C)] 98.6 °F (37 °C)  Heart Rate:  [69-95] 72  Resp:  [14-18] 16  BP: (118-173)/() 118/50        Physical Exam:  Constitutional -no acute distress, non toxic, in bed  HEENT-NCAT, mucous membranes moist  CV-RRR, S1 S2 normal, no m/r/g  Resp-CTAB, no wheezes, rhonchi or rales  Abd-soft, non-tender, non-distended, normo active bowel sounds  Ext-No lower extremity cyanosis, clubbing or edema bilaterally  Neuro-alert and oriented, speech clear, moves all extremities   Psych-normal affect   Skin- No rash on exposed UE or LE bilaterally      Results Reviewed:  I have personally reviewed current lab, radiology, and data and agree.    Results from last 7 days   Lab Units  11/10/18   0546  11/10/18   0019   WBC 10*3/mm3  9.36  9.62   HEMOGLOBIN g/dL  11.4*  12.8   HEMATOCRIT %  34.8  39.4   PLATELETS 10*3/mm3  221  239     Results from last 7 days   Lab Units  11/10/18   1152  11/10/18   0546  11/10/18   0019   SODIUM mmol/L   --   136  136   POTASSIUM mmol/L   --   3.5  3.7   CHLORIDE mmol/L   --   100  98*   CO2 mmol/L   --   27.0  30.0   BUN mg/dL   --   16  16   CREATININE mg/dL   --   0.62  0.75   GLUCOSE mg/dL   --   203*  270*   CALCIUM mg/dL   --   8.9   9.6   ALT (SGPT) U/L   --    --   30   AST (SGOT) U/L   --    --   52*   TROPONIN I ng/mL  11.376*  15.071*  11.854*     Estimated Creatinine Clearance: 45 mL/min (by C-G formula based on SCr of 0.62 mg/dL).  No results found for: BNP    Microbiology Results Abnormal     None          Imaging Results (last 24 hours)     ** No results found for the last 24 hours. **             I have reviewed the medications.      amLODIPine 10 mg Oral Daily   aspirin 81 mg Oral Daily   atorvastatin 20 mg Oral Daily   budesonide 0.5 mg Nebulization BID - RT   carBAMazepine 100 mg Oral BID   carvedilol 3.125 mg Oral Q12H   cetirizine 5 mg Oral Daily   chlorthalidone 25 mg Oral Daily   cholecalciferol 1,000 Units Oral Daily   cycloSPORINE 1 drop Both Eyes BID   diazePAM 2 mg Oral Nightly   enoxaparin 1 mg/kg Subcutaneous Q12H   [START ON 11/11/2018] ferrous sulfate 650 mg Oral Daily With Breakfast   gabapentin 400 mg Oral 4x Daily   insulin detemir 7 Units Subcutaneous Q12H   insulin lispro 0-7 Units Subcutaneous 4x Daily With Meals & Nightly   losartan 100 mg Oral Daily   montelukast 10 mg Oral Nightly   Morphine 30 mg Oral Q12H   OCUVITE-LUTEIN 1 tablet Oral Daily   oxybutynin XL 5 mg Oral Daily   pantoprazole 40 mg Oral Q AM   sodium chloride 3 mL Intravenous Q12H   tiZANidine 4 mg Oral Nightly   vitamin B-12 1,000 mcg Oral Daily   ascorbic acid 1,000 mg Oral Daily         Assessment/Plan   Assessment / Plan     Active Hospital Problems    Diagnosis   • **NSTEMI (non-ST elevated myocardial infarction) (CMS/[facility])   • Hypertension     uncontrolled. I will add chlorthalidone 25 to her current regimen     • GERD (gastroesophageal reflux disease)   • Dyslipidemia     May 2012 - Total 156, triglycerides 297, HDL 47, and LDL 54; on statin therapy      • Diabetes mellitus (CMS/[facility])     Hemoglobin A1c of 8.3.     • Coronary artery disease     post percutaneous coronary intervention and stenting with low normal left ventricular ejection  fraction     • Cerebrovascular disease     e. TIA/CVA, September 2013.  f. Carotid CTA, September 2013:  60% to 70% left carotid bulb stenosis, severe right vertebral stenosis.     • Chest pain     g. On 01/10/2014:  NSTEMI with 3.0 x 15 Integrity BMS to ostium of circumflex per MGR.  Normal LVEF.  h. September 2015, admission to [facility] with ACS with negative enzymes and negative EKG.             Brief Hospital Course to date:  Fani Bird is a 82 y.o. female with h/o CAD and DMII presents after three days of chest pressure.       Assessment & Plan:    DMII  - a1c 8.3  - continue basal bolus but will lower levemir dose this evening. Check glucose qac, hs, mn, 3am. Bmp am    NSTEMI  - LHC shows multivessel disease  - consideration of bypass grafting    Dyslipidemia  - LDL 86  -     HTN      DVT Prophylaxis:  lovenox    CODE STATUS:   Code Status and Medical Interventions:   Ordered at: 11/10/18 1124     Level Of Support Discussed With:    Patient     Code Status:    CPR     Medical Interventions (Level of Support Prior to Arrest):    Full       Disposition: I expect the patient to be discharged home in ? days.      Electronically signed by Júnior Bishop MD, 11/10/18, 4:06 PM.        Electronically signed by Júnior Bishop MD at 11/10/2018  4:11 PM          Consult Notes (last 72 hours) (Notes from 11/9/2018  1:12 PM through 11/12/2018  1:12 PM)      Srinivasa Delacruz MD at 11/10/2018  1:23 PM          CTS Consult  Fani Bird  [id number]  1935    Patient Care Team:  Emerson Alvarez MD as PCP - General (Family Medicine)  Emerson Alvarez MD as PCP - Claims Attributed    CC: My chest pain is gone      Reason for Consult:  LMCA stenosis    HPI: 82-year-old  female with a significant past history of coronary artery disease, carotid and cerebral vascular disease, hypertension, dyslipidemia, type 2 diabetes, and previous cerebrovascular accident.  The patient  underwent coronary artery stenting 5 years ago and has done well until the past 2-3 weeks.  She is recently noted intermittent episodes of substernal chest pain and chest pressure associated with shortness of breath.  She has not had nausea or vomiting she has not had syncope she has not had diaphoresis.  In this setting she states that her pain is similar to what she had 5 years ago.  She presented to the emergency room at [facility] and was transferred to [facility] for further evaluation and for cardiac catheterization.  She was seen by Dr. Mercedes felt that catheterization was indicated.  I have reviewed the cardiac catheter films and data.  This patient has a tight left main coronary artery stenosis with an associated ostial stenosis of the circumflex coronary artery.  The right coronary artery which is dominant has nonflow restrictive disease.  The patient's ejection fraction is normal.      NSTEMI (non-ST elevated myocardial infarction) (CMS/HCC)    Chest pain    Cerebrovascular disease    Coronary artery disease    Hypertension    Dyslipidemia    GERD (gastroesophageal reflux disease)    Diabetes mellitus (CMS/[facility])      Review of Systems:  General: No anorexia, no weight loss, general malaise and weakness.  No fever chills or night sweats.  HEENT: No headaches no visual changes no hearing loss no rhinitis no pharyngitis  Pulmonary:(+) shortness of breath, no cough, no hemoptysis  Heart: No palpitations,  (+) malaise, (+) shortness of breath, no atrial fibrillation, no bradycardia, no syncope.  (+) anginal quality chest pain.  Gastrointestinal: No nausea, vomiting, diarrhea, or constipation. No acholic stool, no jaundice.  Renal: No dysuria, no frequency, no hematuria.  Skin: No rash, no skin lesions, no skin tumors.  Neurologic: No seizures, no muscle weakness, no sensory deficit, no amaurosis,  Psychiatric: No anxiety, no history of psychosis  Hematologic: No bleeding history,  no ease of bruising, no history of blood disorder.  All other systems were reviewed and are negative.    History  Past Medical History:   Diagnosis Date   • Cataract    • Cerebrovascular disease    • Chest pain    • Coronary artery disease     no recent ischemic evaluation   • Diabetes mellitus (CMS/[facility])     Hemoglobin A1c of 8.3.   • Dyslipidemia     May 2012 - Total 156, triglycerides 297, HDL 47, and LDL 54.   • Dyspnea    • GERD (gastroesophageal reflux disease)    • Hypertension    • Pneumonia    • Swelling of lower extremity     improved with stockings   • Vitamin D deficiency      Past Surgical History:   Procedure Laterality Date   • BLADDER REPAIR     • BREAST BIOPSY     • BREAST CYST ASPIRATION     • CARDIAC CATHETERIZATION     • CARPAL TUNNEL RELEASE Bilateral    • CATARACT EXTRACTION     • CHOLECYSTECTOMY     • CORONARY STENT PLACEMENT     • HYSTERECTOMY     • SPINAL FUSION       Family History   Problem Relation Age of Onset   • Heart disease Mother    • Hypertension Mother    • Heart disease Father    • Hypertension Father    • Heart attack Father    • Diabetes Father    • Diabetes Brother    • Liver cancer Brother    • Hypertension Brother    • Stroke Maternal Grandmother    • Heart attack Maternal Grandmother    • Stroke Maternal Grandfather    • No Known Problems Paternal Grandmother    • No Known Problems Paternal Grandfather      Social History     Tobacco Use   • Smoking status: Never Smoker   • Smokeless tobacco: Never Used   Substance Use Topics   • Alcohol use: No   • Drug use: No     Medications Prior to Admission   Medication Sig Dispense Refill Last Dose   • amLODIPine (NORVASC) 10 MG tablet Take 10 mg by mouth Daily.   Past Week at Unknown time   • ascorbic acid (VITAMIN C) 1000 MG tablet Take 1 tablet by mouth Daily.   Past Week at Unknown time   • aspirin 81 MG tablet Take  by mouth daily.   Past Week at Unknown time   • atorvastatin (LIPITOR) 10 MG tablet Take 20 mg by mouth Daily.    Past Week at Unknown time   • celecoxib (CELEBREX) 200 MG capsule Take  by mouth Daily.   Past Week at Unknown time   • chlorthalidone (HYGROTON) 25 MG tablet Take 1 tablet by mouth Daily. 90 tablet 2 Past Week at Unknown time   • Cholecalciferol (VITAMIN D PO) Take  by mouth daily.   Past Week at Unknown time   • Cranberry 1000 MG capsule Take  by mouth Daily.   Past Week at Unknown time   • diazepam (VALIUM) 2 MG tablet Take 2 mg by mouth Every Night.   Past Week at Unknown time   • estropipate (OGEN) 1.5 MG tablet Take  by mouth daily.   Past Week at Unknown time   • ferrous sulfate 325 (65 FE) MG tablet Take  by mouth Daily. 2 daily   Past Week at Unknown time   • fexofenadine (ALLEGRA) 180 MG tablet Take 180 mg by mouth 2 (Two) Times a Day.   Past Week at Unknown time   • fluticasone (FLOVENT HFA) 220 MCG/ACT inhaler daily.   Past Week at Unknown time   • gabapentin (NEURONTIN) 400 MG capsule Take 400 mg by mouth 4 (Four) Times a Day.   Past Week at Unknown time   • insulin aspart prot-insulin aspart (novoLOG 70/30) (70-30) 100 UNIT/ML injection Inject  under the skin into the appropriate area as directed 2 (Two) Times a Day With Meals. 34 in morning and 22 a night   Past Week at Unknown time   • losartan (COZAAR) 100 MG tablet Take  by mouth daily.   Past Week at Unknown time   • mometasone (NASONEX) 50 MCG/ACT nasal spray 2 sprays into each nostril Daily.   Past Week at Unknown time   • montelukast (SINGULAIR) 10 MG tablet Take 10 mg by mouth Every Night.   Past Week at Unknown time   • Morphine (MS CONTIN) 30 MG 12 hr tablet Take 30 mg by mouth 2 (Two) Times a Day.   Past Week at Unknown time   • Morphine (MSIR) 15 MG tablet Take 15 mg by mouth Every 4 (Four) Hours As Needed for Severe Pain .   Past Week at Unknown time   • Multiple Vitamins-Minerals (EYE VITAMINS PO) Take  by mouth Daily.   Taking   • nitrofurantoin, macrocrystal-monohydrate, (MACROBID) 100 MG capsule Take 50 mg by mouth Daily.   Past Week  at Unknown time   • nitroglycerin (NITROSTAT) 0.4 MG SL tablet Place 1 tablet under the tongue Every 5 (Five) Minutes As Needed for Chest Pain. 25 tablet 3 Past Week at Unknown time   • O2 (OXYGEN) Inhale 2 L/min Every Night.   Past Week at Unknown time   • Omega-3 Fatty Acids (FISH OIL PO) Take  by mouth daily.   Past Week at Unknown time   • pantoprazole (PROTONIX) 40 MG EC tablet Take  by mouth 2 (two) times a day.   Past Week at Unknown time   • tiZANidine (ZANAFLEX) 4 MG tablet Take  by mouth Daily.   Past Week at Unknown time   • tolterodine LA (DETROL LA) 4 MG 24 hr capsule Take 1 capsule by mouth daily.   Past Week at Unknown time   • albuterol (PROVENTIL) (2.5 MG/3ML) 0.083% nebulizer solution Take 2.5 mg by nebulization Every 4 (Four) Hours As Needed.   Taking   • albuterol (VENTOLIN HFA) 108 (90 BASE) MCG/ACT inhaler 2 (Two) Times a Day.   Taking   • carBAMazepine (TEGRETOL) 200 MG tablet Take 100 mg by mouth 2 (Two) Times a Day.   Taking   • Cyanocobalamin (VITAMIN B-12 PO) Take 1 tablet by mouth 1 (one) time per week.   Taking   • cycloSPORINE (RESTASIS) 0.05 % ophthalmic emulsion Apply  to eye 2 (two) times a day.   Taking   • Multiple Vitamins-Minerals (ICAPS AREDS 2 PO) Take  by mouth Daily.   Taking     Allergies:  Isosorbide and Augmentin [amoxicillin-pot clavulanate]    Objective    Vital Signs  Temp:  [97.5 °F (36.4 °C)-98.7 °F (37.1 °C)] 98.6 °F (37 °C)  Heart Rate:  [69-95] 69  Resp:  [14-18] 16  BP: (124-173)/() 146/96    Physical Exam:  General Appearance: alert, appears stated age and cooperative  Head: normocephalic, without obvious abnormality and atraumatic  Ears/Nose: no rhinitis, external ears normal  Throat:  no oral lesions, no pharyngitis  Neck: no adenopathy, suppple, trachea midline, no thyromegaly, no carotid bruit and no JVD  Lungs: clear to auscultation, respirations regular, respirations even and respirations unlabored  Heart: regular rhythm & normal rate, normal S1, S2,  no murmur  Abdomen: normal bowel sounds, no masses, soft, non-tender  Extremities: moves extremities well and no edema  Pulses: pulses palpable and equal bilaterally (femoral, DP, PT)  Skin: no bleeding, bruising or rash  Neurologic: mental status orientated to person, place, time and situation, CN intact, no motor or sensory loss          Data Review:  Results from last 7 days   Lab Units  11/10/18   0546   WBC 10*3/mm3  9.36   HEMOGLOBIN g/dL  11.4*   HEMATOCRIT %  34.8   PLATELETS 10*3/mm3  221     Results from last 7 days   Lab Units  11/10/18   0546   SODIUM mmol/L  136   POTASSIUM mmol/L  3.5   CHLORIDE mmol/L  100   CO2 mmol/L  27.0   BUN mg/dL  16   CREATININE mg/dL  0.62   GLUCOSE mg/dL  203*   CALCIUM mg/dL  8.9              Imaging:Cardiac Catheterization ( results as noted in PI).        Assessment: Unstable angina with LMCA stenosis.  Past history of CVA with current limited mobility.       Plan:  Schedule CABGx2 Tuesday/ Wednesday depending on P2Y12.    I have reviewed, verified, and confirmed the above history and current status.  I have examined the patient and confirmed the above physical findings.Above plan and treatment regimen discussed in detail with patient.  Options of treatment, attendant risks vs benefits, and my recommendations were discussed and all questions answered.      Srinivasa Delacruz MD  11/10/18  1:23 PM    Electronically signed by Srinivasa Delacruz MD at 11/10/2018  1:47 PM     Bethel Mercedes MD at 11/10/2018  8:05 AM      Consult Orders    1. Inpatient Cardiology Consult [[id number]] ordered by Katarzyna Garner DO at 11/10/18 0022                 [place] [facility]   [address], [address]  [place], [place], [address] [phone]  [email or web address]           INPATIENT CONSULTATION NOTE    Referring Physician: DO Patsy Romeo Josie G, Do  [address] [place] [address]  [address]  [place] [address]    Patient Care Team:  Patient Care Team:  Antonio  Emerson STOUT MD as PCP - General (Family Medicine)  Emerson Alvarez MD as PCP - Claims Attributed    Reason for consultation: NSTEMI    Subjective     HPI:    Fani Bird is a 82 y.o. female.  History of Present Illness    The patient has a history of CAD s/p BMS 1/2014, TIA/CVA, hypertension, hyperlipidemia, GERD, vitamin D deficiency, diabetes mellitus, and rhabdomyolysis.  The patient presented to the [facility] as a transfer from [facility].  She presented to their ED with complaints of intermittent midsternal chest pain that had been ongoing for several days.  She states that this pain is similar to the pain she felt prior to undergoing bare metal stenting in 2014.  She notes that she had been pain free since that time.  Nitroglycerin relieved her pain and she is currently pain-free.  She also notes that she has had recent palpitations and nausea.  She denies any shortness of breath, diaphoresis, lower extremity edema, lightheadedness, syncope.    PFSH:  Patient Active Problem List   Diagnosis   • Chest pain   • Cerebrovascular disease   • Coronary artery disease   • Hypertension   • Dyslipidemia   • GERD (gastroesophageal reflux disease)   • Vitamin D deficiency   • Diabetes mellitus (CMS/[facility])   • Dyspnea   • NSTEMI (non-ST elevated myocardial infarction) (CMS/[facility])       No current facility-administered medications on file prior to encounter.      Current Outpatient Medications on File Prior to Encounter   Medication Sig Dispense Refill   • amLODIPine (NORVASC) 10 MG tablet Take 10 mg by mouth Daily.     • ascorbic acid (VITAMIN C) 1000 MG tablet Take 1 tablet by mouth Daily.     • aspirin 81 MG tablet Take  by mouth daily.     • atorvastatin (LIPITOR) 10 MG tablet Take 20 mg by mouth Daily.     • celecoxib (CELEBREX) 200 MG capsule Take  by mouth Daily.     • chlorthalidone (HYGROTON) 25 MG tablet Take 1 tablet by mouth Daily. 90 tablet 2   • Cholecalciferol (VITAMIN D PO) Take  by mouth daily.     •  Cranberry 1000 MG capsule Take  by mouth Daily.     • diazepam (VALIUM) 2 MG tablet Take 2 mg by mouth Every Night.     • estropipate (OGEN) 1.5 MG tablet Take  by mouth daily.     • ferrous sulfate 325 (65 FE) MG tablet Take  by mouth Daily. 2 daily     • fexofenadine (ALLEGRA) 180 MG tablet Take 180 mg by mouth 2 (Two) Times a Day.     • fluticasone (FLOVENT HFA) 220 MCG/ACT inhaler daily.     • gabapentin (NEURONTIN) 400 MG capsule Take 400 mg by mouth 4 (Four) Times a Day.     • insulin aspart prot-insulin aspart (novoLOG 70/30) (70-30) 100 UNIT/ML injection Inject  under the skin into the appropriate area as directed 2 (Two) Times a Day With Meals. 34 in morning and 22 a night     • losartan (COZAAR) 100 MG tablet Take  by mouth daily.     • mometasone (NASONEX) 50 MCG/ACT nasal spray 2 sprays into each nostril Daily.     • montelukast (SINGULAIR) 10 MG tablet Take 10 mg by mouth Every Night.     • Morphine (MS CONTIN) 30 MG 12 hr tablet Take 30 mg by mouth 2 (Two) Times a Day.     • Morphine (MSIR) 15 MG tablet Take 15 mg by mouth Every 4 (Four) Hours As Needed for Severe Pain .     • Multiple Vitamins-Minerals (EYE VITAMINS PO) Take  by mouth Daily.     • nitrofurantoin, macrocrystal-monohydrate, (MACROBID) 100 MG capsule Take 50 mg by mouth Daily.     • nitroglycerin (NITROSTAT) 0.4 MG SL tablet Place 1 tablet under the tongue Every 5 (Five) Minutes As Needed for Chest Pain. 25 tablet 3   • O2 (OXYGEN) Inhale 2 L/min Every Night.     • Omega-3 Fatty Acids (FISH OIL PO) Take  by mouth daily.     • pantoprazole (PROTONIX) 40 MG EC tablet Take  by mouth 2 (two) times a day.     • tiZANidine (ZANAFLEX) 4 MG tablet Take  by mouth Daily.     • tolterodine LA (DETROL LA) 4 MG 24 hr capsule Take 1 capsule by mouth daily.     • albuterol (PROVENTIL) (2.5 MG/3ML) 0.083% nebulizer solution Take 2.5 mg by nebulization Every 4 (Four) Hours As Needed.     • albuterol (VENTOLIN HFA) 108 (90 BASE) MCG/ACT inhaler 2 (Two)  Times a Day.     • carBAMazepine (TEGRETOL) 200 MG tablet Take 100 mg by mouth 2 (Two) Times a Day.     • Cyanocobalamin (VITAMIN B-12 PO) Take 1 tablet by mouth 1 (one) time per week.     • cycloSPORINE (RESTASIS) 0.05 % ophthalmic emulsion Apply  to eye 2 (two) times a day.     • Multiple Vitamins-Minerals (ICAPS AREDS 2 PO) Take  by mouth Daily.       Allergies   Allergen Reactions   • Isosorbide    • Augmentin [Amoxicillin-Pot Clavulanate] Diarrhea and Rash       Social History     Socioeconomic History   • Marital status:      Spouse name: Not on file   • Number of children: Not on file   • Years of education: Not on file   • Highest education level: Not on file   Occupational History   • Occupation: retired   Tobacco Use   • Smoking status: Never Smoker   • Smokeless tobacco: Never Used   Substance and Sexual Activity   • Alcohol use: No   • Drug use: No   • Sexual activity: Defer   Social History Narrative    Caffeine: 3 servings per day    Patient lives with her urmila     Family History   Problem Relation Age of Onset   • Heart disease Mother    • Hypertension Mother    • Heart disease Father    • Hypertension Father    • Heart attack Father    • Diabetes Father    • Diabetes Brother    • Liver cancer Brother    • Hypertension Brother    • Stroke Maternal Grandmother    • Heart attack Maternal Grandmother    • Stroke Maternal Grandfather    • No Known Problems Paternal Grandmother    • No Known Problems Paternal Grandfather        Review of Systems:  Positive for chest pain, palpitations, nausea  Negative for shortness of breath, diaphoresis, lower extremity edema, lightheadedness, and syncope.  All other systems reviewed are negative.    Objective     Objective:     Vital Sign Min/Max for last 24 hours  Temp  Min: 97.5 °F (36.4 °C)  Max: 98.7 °F (37.1 °C)   BP  Min: 124/66  Max: 173/81   Pulse  Min: 75  Max: 95   Resp  Min: 14  Max: 16   SpO2  Min: 94 %  Max: 97 %   No Data Recorded       Intake/Output Summary (Last 24 hours) at 11/10/2018 0805  Last data filed at 11/10/2018 0626  Gross per 24 hour   Intake --   Output 1200 ml   Net -1200 ml           Vitals:    11/10/18 0743   BP: 124/66   Pulse: 75   Resp: 16   Temp: 98.7 °F (37.1 °C)   SpO2:        CONSTITUTIONAL: Well-nourished. In no acute distress.   SKIN: Warm and dry. No rashes noted  HEENT: Head is normocephalic and atraumatic. Mucous membranes are pink and moist.   NECK: Supple without masses or thyromegaly.   LUNGS: Normal effort. Clear to auscultation bilaterally without wheezing, rhonchi, or rales noted.   CARDIOVASCULAR: The heart has a regular rate with a normal S1 and S2. There is no gallop, rub, or click appreciated.   PERIPHERAL VASCULAR: Carotid upstroke is 2+ bilaterally and with bruits. Radial pulses are 2+ bilaterally. Posterior tibial pulses are 2+ and symmetrical. There is no lower extremity edema.   ABDOMEN: Soft with no tenderness with palpitation. No hepatosplenomegaly  MUSCULOSKELETAL:  No digital cyanosis  NEUROLOGICAL: Nonfocal.  PSYCHIATRIC: Alert, orientated x 3, appropriate affect     Labs:  Lab Results   Component Value Date    CKTOTAL 78 09/07/2015    CKMB 1.2 09/07/2015    CKMBINDEX 2 09/07/2015    TROPONINI 15.071 (C) 11/10/2018       Glucose   Date Value Ref Range Status   11/10/2018 203 (H) 70 - 100 mg/dL Final     BUN   Date Value Ref Range Status   11/10/2018 16 9 - 23 mg/dL Final     Creatinine   Date Value Ref Range Status   11/10/2018 0.62 0.60 - 1.30 mg/dL Final     Sodium   Date Value Ref Range Status   11/10/2018 136 132 - 146 mmol/L Final     Potassium   Date Value Ref Range Status   11/10/2018 3.5 3.5 - 5.5 mmol/L Final     Chloride   Date Value Ref Range Status   11/10/2018 100 99 - 109 mmol/L Final     CO2   Date Value Ref Range Status   11/10/2018 27.0 20.0 - 31.0 mmol/L Final     Calcium   Date Value Ref Range Status   11/10/2018 8.9 8.7 - 10.4 mg/dL Final     Total Protein   Date Value Ref Range  Status   11/10/2018 6.9 5.7 - 8.2 g/dL Final     Albumin   Date Value Ref Range Status   11/10/2018 4.31 3.20 - 4.80 g/dL Final     ALT (SGPT)   Date Value Ref Range Status   11/10/2018 30 7 - 40 U/L Final     AST (SGOT)   Date Value Ref Range Status   11/10/2018 52 (H) 0 - 33 U/L Final     Alkaline Phosphatase   Date Value Ref Range Status   11/10/2018 74 25 - 100 U/L Final     Total Bilirubin   Date Value Ref Range Status   11/10/2018 0.2 (L) 0.3 - 1.2 mg/dL Final     eGFR Non  Amer   Date Value Ref Range Status   11/10/2018 92 >60 mL/min/1.73 Final     BUN/Creatinine Ratio   Date Value Ref Range Status   11/10/2018 25.8 (H) 7.0 - 25.0 Final     Anion Gap   Date Value Ref Range Status   11/10/2018 9.0 3.0 - 11.0 mmol/L Final       Lab Results   Component Value Date    CHOL 152 11/10/2018     Lab Results   Component Value Date    TRIG 379 (H) 11/10/2018     Lab Results   Component Value Date    HDL 37 (L) 11/10/2018     No components found for: LDLCALC  No results found for: VLDL  No results found for: LDLHDL    WBC   Date Value Ref Range Status   11/10/2018 9.36 3.50 - 10.80 10*3/mm3 Final     RBC   Date Value Ref Range Status   11/10/2018 3.86 (L) 3.89 - 5.14 10*6/mm3 Final     Hemoglobin   Date Value Ref Range Status   11/10/2018 11.4 (L) 11.5 - 15.5 g/dL Final     Hematocrit   Date Value Ref Range Status   11/10/2018 34.8 34.5 - 44.0 % Final     MCV   Date Value Ref Range Status   11/10/2018 90.2 80.0 - 99.0 fL Final     MCH   Date Value Ref Range Status   11/10/2018 29.5 27.0 - 31.0 pg Final     MCHC   Date Value Ref Range Status   11/10/2018 32.8 32.0 - 36.0 g/dL Final     RDW   Date Value Ref Range Status   11/10/2018 12.6 11.3 - 14.5 % Final     RDW-SD   Date Value Ref Range Status   11/10/2018 41.0 37.0 - 54.0 fl Final     MPV   Date Value Ref Range Status   11/10/2018 11.2 6.0 - 12.0 fL Final     Platelets   Date Value Ref Range Status   11/10/2018 221 150 - 450 10*3/mm3 Final     Neutrophil %    Date Value Ref Range Status   11/10/2018 65.4 41.0 - 71.0 % Final     Lymphocyte %   Date Value Ref Range Status   11/10/2018 24.6 24.0 - 44.0 % Final     Monocyte %   Date Value Ref Range Status   11/10/2018 6.2 0.0 - 12.0 % Final     Eosinophil %   Date Value Ref Range Status   11/10/2018 3.5 (H) 0.0 - 3.0 % Final     Basophil %   Date Value Ref Range Status   11/10/2018 0.3 0.0 - 1.0 % Final     Immature Grans %   Date Value Ref Range Status   11/10/2018 0.3 0.0 - 0.6 % Final     Neutrophils, Absolute   Date Value Ref Range Status   11/10/2018 6.12 1.50 - 8.30 10*3/mm3 Final     Lymphocytes, Absolute   Date Value Ref Range Status   11/10/2018 2.30 0.60 - 4.80 10*3/mm3 Final     Monocytes, Absolute   Date Value Ref Range Status   11/10/2018 0.58 0.00 - 1.00 10*3/mm3 Final     Eosinophils, Absolute   Date Value Ref Range Status   11/10/2018 0.33 (H) 0.00 - 0.30 10*3/mm3 Final     Basophils, Absolute   Date Value Ref Range Status   11/10/2018 0.03 0.00 - 0.20 10*3/mm3 Final     Immature Grans, Absolute   Date Value Ref Range Status   11/10/2018 0.03 0.00 - 0.03 10*3/mm3 Final     Troponin at OSH 1.15 ---> 3.69 --> 15    Diagnostic Data:    EKG:  Normal sinus rhythm, nonspecific ST and T wave abnormality, left axis deviation    Tele:  NSR    Carotid Duplex 10/2017  · No obstructive carotid stenosis bilaterally  · Antegrade bilateral vertebral flow.    [facility] 1/2014  1.   Mild regional wall motion abnormality with normal left        ventricular systolic function.   2.   Successful Integrity bare-metal stent deployment in        the ostium of the left circumflex coronary artery for        single-vessel coronary artery disease.   Assessment     Assessment and Plan:   ASSESSMENT:  -NSTEMI, troponin 11.854 on arrival, now 15.071. Currently pain free with nitroglycerin. Last ischemic evaluation 1/2014 with placement of bare metal stent.  -CAD, s/p BMS 1/2014, ASA and statin at home. Follows with Dr. Whitley as an outpatient.    -Hypertension, on multiple antihypertensives as an outpatient.   -Hyperlipidemia, atorvastatin, LDL 86   -Diabtetes Mellitus    PLAN:  -LHC +/- CBI via the right femoral approach. The risks, benefits, and alternatives of the procedure have been reviewed and the patient wishes to proceed.   -TTE  -Keep NPO.    KERI Joe obtained past medical, family history, social history, review of systems and functioned as a scribe for the remainder of the dictation for Dr. Mercedes.     Electronically signed by KERI Beyer, 11/10/18, 8:54 AM.    11/10/2018    I, Bethel Mercedes MD, personally performed the services as scribed by the above named individual. I have made any necessary edits and it is both accurate and complete.     Bethel Mercedes MD, MSc, [facility]  Interventional Cardiology  [facility]          Electronically signed by Bethel Mercedes MD at 11/10/2018  9:09 AM

## 2018-11-13 ENCOUNTER — APPOINTMENT (OUTPATIENT)
Dept: GENERAL RADIOLOGY | Facility: HOSPITAL | Age: 83
End: 2018-11-13

## 2018-11-13 LAB
GLUCOSE BLDC GLUCOMTR-MCNC: 184 MG/DL (ref 70–130)
GLUCOSE BLDC GLUCOMTR-MCNC: 266 MG/DL (ref 70–130)
GLUCOSE BLDC GLUCOMTR-MCNC: 303 MG/DL (ref 70–130)
GLUCOSE BLDC GLUCOMTR-MCNC: 322 MG/DL (ref 70–130)
PA ADP PRP-ACNC: 150 PRU

## 2018-11-13 PROCEDURE — 97162 PT EVAL MOD COMPLEX 30 MIN: CPT

## 2018-11-13 PROCEDURE — 63710000001 INSULIN DETEMIR PER 5 UNITS: Performed by: INTERNAL MEDICINE

## 2018-11-13 PROCEDURE — 85576 BLOOD PLATELET AGGREGATION: CPT | Performed by: THORACIC SURGERY (CARDIOTHORACIC VASCULAR SURGERY)

## 2018-11-13 PROCEDURE — 99231 SBSQ HOSP IP/OBS SF/LOW 25: CPT | Performed by: INTERNAL MEDICINE

## 2018-11-13 PROCEDURE — 97166 OT EVAL MOD COMPLEX 45 MIN: CPT

## 2018-11-13 PROCEDURE — 71045 X-RAY EXAM CHEST 1 VIEW: CPT

## 2018-11-13 PROCEDURE — 99232 SBSQ HOSP IP/OBS MODERATE 35: CPT | Performed by: INTERNAL MEDICINE

## 2018-11-13 PROCEDURE — 82962 GLUCOSE BLOOD TEST: CPT

## 2018-11-13 PROCEDURE — 25010000002 ENOXAPARIN PER 10 MG: Performed by: INTERNAL MEDICINE

## 2018-11-13 PROCEDURE — 99233 SBSQ HOSP IP/OBS HIGH 50: CPT | Performed by: THORACIC SURGERY (CARDIOTHORACIC VASCULAR SURGERY)

## 2018-11-13 PROCEDURE — 94640 AIRWAY INHALATION TREATMENT: CPT

## 2018-11-13 PROCEDURE — 94799 UNLISTED PULMONARY SVC/PX: CPT

## 2018-11-13 RX ORDER — DOCUSATE SODIUM 100 MG/1
100 CAPSULE, LIQUID FILLED ORAL AS NEEDED
COMMUNITY
End: 2019-10-14

## 2018-11-13 RX ORDER — L.ACID,PARA/B.BIFIDUM/S.THERM 8B CELL
1 CAPSULE ORAL DAILY
Status: DISCONTINUED | OUTPATIENT
Start: 2018-11-14 | End: 2018-11-14 | Stop reason: HOSPADM

## 2018-11-13 RX ORDER — MORPHINE SULFATE 15 MG/1
15 TABLET, FILM COATED, EXTENDED RELEASE ORAL 2 TIMES DAILY
Status: DISCONTINUED | OUTPATIENT
Start: 2018-11-13 | End: 2018-11-14 | Stop reason: HOSPADM

## 2018-11-13 RX ORDER — MORPHINE SULFATE 30 MG/1
30 TABLET, FILM COATED, EXTENDED RELEASE ORAL 2 TIMES DAILY
Status: DISCONTINUED | OUTPATIENT
Start: 2018-11-13 | End: 2018-11-14 | Stop reason: HOSPADM

## 2018-11-13 RX ORDER — DOXYCYCLINE 100 MG/1
100 CAPSULE ORAL EVERY 12 HOURS SCHEDULED
Status: DISCONTINUED | OUTPATIENT
Start: 2018-11-13 | End: 2018-11-14 | Stop reason: HOSPADM

## 2018-11-13 RX ORDER — PANTOPRAZOLE SODIUM 40 MG/10ML
40 INJECTION, POWDER, LYOPHILIZED, FOR SOLUTION INTRAVENOUS ONCE
Status: COMPLETED | OUTPATIENT
Start: 2018-11-13 | End: 2018-11-13

## 2018-11-13 RX ADMIN — CYCLOSPORINE 1 DROP: 0.5 EMULSION OPHTHALMIC at 09:13

## 2018-11-13 RX ADMIN — INSULIN LISPRO 5 UNITS: 100 INJECTION, SOLUTION INTRAVENOUS; SUBCUTANEOUS at 20:17

## 2018-11-13 RX ADMIN — Medication 3 ML: at 08:42

## 2018-11-13 RX ADMIN — CARBAMAZEPINE 100 MG: 200 TABLET ORAL at 08:42

## 2018-11-13 RX ADMIN — ATORVASTATIN CALCIUM 80 MG: 40 TABLET, FILM COATED ORAL at 08:37

## 2018-11-13 RX ADMIN — AMLODIPINE BESYLATE 10 MG: 10 TABLET ORAL at 08:38

## 2018-11-13 RX ADMIN — BUDESONIDE 0.5 MG: 0.5 INHALANT RESPIRATORY (INHALATION) at 19:41

## 2018-11-13 RX ADMIN — LOSARTAN POTASSIUM 100 MG: 50 TABLET ORAL at 08:39

## 2018-11-13 RX ADMIN — INSULIN LISPRO 4 UNITS: 100 INJECTION, SOLUTION INTRAVENOUS; SUBCUTANEOUS at 12:34

## 2018-11-13 RX ADMIN — FEXOFENADINE HCL 180 MG: 180 TABLET ORAL at 20:20

## 2018-11-13 RX ADMIN — CYANOCOBALAMIN TAB 1000 MCG 1000 MCG: 1000 TAB at 08:36

## 2018-11-13 RX ADMIN — ASPIRIN 81 MG 81 MG: 81 TABLET ORAL at 08:37

## 2018-11-13 RX ADMIN — OXYCODONE HYDROCHLORIDE AND ACETAMINOPHEN 1000 MG: 500 TABLET ORAL at 08:39

## 2018-11-13 RX ADMIN — TIZANIDINE 4 MG: 4 TABLET ORAL at 20:18

## 2018-11-13 RX ADMIN — GABAPENTIN 400 MG: 400 CAPSULE ORAL at 17:44

## 2018-11-13 RX ADMIN — CARVEDILOL 3.12 MG: 3.12 TABLET, FILM COATED ORAL at 08:36

## 2018-11-13 RX ADMIN — GABAPENTIN 400 MG: 400 CAPSULE ORAL at 12:34

## 2018-11-13 RX ADMIN — VITAMIN D, TAB 1000IU (100/BT) 1000 UNITS: 25 TAB at 08:39

## 2018-11-13 RX ADMIN — MORPHINE SULFATE 30 MG: 30 TABLET, EXTENDED RELEASE ORAL at 08:38

## 2018-11-13 RX ADMIN — PANTOPRAZOLE SODIUM 40 MG: 40 INJECTION, POWDER, FOR SOLUTION INTRAVENOUS at 22:18

## 2018-11-13 RX ADMIN — MORPHINE SULFATE 30 MG: 30 TABLET, EXTENDED RELEASE ORAL at 22:18

## 2018-11-13 RX ADMIN — MORPHINE SULFATE 15 MG: 15 TABLET, EXTENDED RELEASE ORAL at 17:44

## 2018-11-13 RX ADMIN — GABAPENTIN 400 MG: 400 CAPSULE ORAL at 06:05

## 2018-11-13 RX ADMIN — GABAPENTIN 400 MG: 400 CAPSULE ORAL at 23:44

## 2018-11-13 RX ADMIN — PANTOPRAZOLE SODIUM 40 MG: 40 TABLET, DELAYED RELEASE ORAL at 06:05

## 2018-11-13 RX ADMIN — Medication 3 ML: at 20:18

## 2018-11-13 RX ADMIN — INSULIN DETEMIR 12 UNITS: 100 INJECTION, SOLUTION SUBCUTANEOUS at 09:13

## 2018-11-13 RX ADMIN — GABAPENTIN 400 MG: 400 CAPSULE ORAL at 00:50

## 2018-11-13 RX ADMIN — CHLORTHALIDONE 25 MG: 25 TABLET ORAL at 08:37

## 2018-11-13 RX ADMIN — INSULIN LISPRO 2 UNITS: 100 INJECTION, SOLUTION INTRAVENOUS; SUBCUTANEOUS at 08:41

## 2018-11-13 RX ADMIN — ENOXAPARIN SODIUM 60 MG: 60 INJECTION SUBCUTANEOUS at 08:38

## 2018-11-13 RX ADMIN — CYCLOSPORINE 1 DROP: 0.5 EMULSION OPHTHALMIC at 20:18

## 2018-11-13 RX ADMIN — CARBAMAZEPINE 100 MG: 200 TABLET ORAL at 20:18

## 2018-11-13 RX ADMIN — BUDESONIDE 0.5 MG: 0.5 INHALANT RESPIRATORY (INHALATION) at 08:24

## 2018-11-13 RX ADMIN — MONTELUKAST SODIUM 10 MG: 10 TABLET, FILM COATED ORAL at 20:18

## 2018-11-13 RX ADMIN — DIAZEPAM 2 MG: 2 TABLET ORAL at 20:18

## 2018-11-13 RX ADMIN — OXYBUTYNIN CHLORIDE 5 MG: 5 TABLET, EXTENDED RELEASE ORAL at 08:37

## 2018-11-13 RX ADMIN — Medication 650 MG: at 08:39

## 2018-11-13 RX ADMIN — INSULIN LISPRO 5 UNITS: 100 INJECTION, SOLUTION INTRAVENOUS; SUBCUTANEOUS at 17:46

## 2018-11-13 RX ADMIN — INSULIN DETEMIR 12 UNITS: 100 INJECTION, SOLUTION SUBCUTANEOUS at 20:16

## 2018-11-13 RX ADMIN — ENOXAPARIN SODIUM 60 MG: 60 INJECTION SUBCUTANEOUS at 20:18

## 2018-11-13 RX ADMIN — CLOPIDOGREL BISULFATE 75 MG: 75 TABLET ORAL at 08:37

## 2018-11-13 RX ADMIN — DOXYCYCLINE 100 MG: 100 CAPSULE ORAL at 22:18

## 2018-11-13 RX ADMIN — CARVEDILOL 3.12 MG: 3.12 TABLET, FILM COATED ORAL at 20:19

## 2018-11-13 RX ADMIN — Medication 1 TABLET: at 08:37

## 2018-11-13 NOTE — THERAPY EVALUATION
Acute Care - Occupational Therapy Initial Evaluation  [facility]     Patient Name: Fani Bird  : 1935  MRN: [id number]  Today's Date: 2018  Onset of Illness/Injury or Date of Surgery: 18  Date of Referral to OT: 18  Referring Physician: MD Bishop    Admit Date: 2018       ICD-10-CM ICD-9-CM   1. NSTEMI (non-ST elevated myocardial infarction) (CMS/HCC) I21.4 410.70   2. Impaired mobility and ADLs Z74.09 799.89   3. Impaired functional mobility, balance, gait, and endurance Z74.09 V49.89     Patient Active Problem List   Diagnosis   • Chest pain   • Cerebrovascular disease   • Coronary artery disease   • Hypertension   • Dyslipidemia   • GERD (gastroesophageal reflux disease)   • Vitamin D deficiency   • Diabetes mellitus (CMS/HCC)   • Dyspnea   • NSTEMI (non-ST elevated myocardial infarction) (CMS/HCC)     Past Medical History:   Diagnosis Date   • Cataract    • Cerebrovascular disease    • Chest pain    • Coronary artery disease     no recent ischemic evaluation   • Diabetes mellitus (CMS/[facility])     Hemoglobin A1c of 8.3.   • Dyslipidemia     May 2012 - Total 156, triglycerides 297, HDL 47, and LDL 54.   • Dyspnea    • GERD (gastroesophageal reflux disease)    • Hypertension    • Pneumonia    • Swelling of lower extremity     improved with stockings   • Vitamin D deficiency      Past Surgical History:   Procedure Laterality Date   • BLADDER REPAIR     • BREAST BIOPSY     • BREAST CYST ASPIRATION     • CARDIAC CATHETERIZATION     • CARPAL TUNNEL RELEASE Bilateral    • CATARACT EXTRACTION     • CHOLECYSTECTOMY     • CORONARY STENT PLACEMENT     • HYSTERECTOMY     • SPINAL FUSION            OT ASSESSMENT FLOWSHEET (last 72 hours)      Occupational Therapy Evaluation     Row Name 18 1425                   OT Evaluation Time/Intention    Subjective Information  complains of;weakness;fatigue  -HK        Document Type  evaluation  -HK        Mode of Treatment  individual  therapy;occupational therapy  -HK        Patient Effort  good  -HK        Symptoms Noted During/After Treatment  fatigue  -HK           General Information    Patient Profile Reviewed?  yes  -HK        Onset of Illness/Injury or Date of Surgery  11/09/18  -HK        Referring Physician  MD Dario  -HK        Patient Observations  alert;cooperative;agree to therapy  -HK        Patient/Family Observations  No family at bedside.   -HK        General Observations of Patient  Pt received upright in chair with IV heplocked.   -HK        Prior Level of Function  independent:;all household mobility;community mobility;gait;transfer;bed mobility;min assist:;ADL's  -HK        Equipment Currently Used at Home  shower chair;walker, rolling;raised toilet;grab bar  -HK        Pertinent History of Current Functional Problem  Pt is a 82 YOF who presents to [facility] from OSH due to non-STEMI. Pt reports 3 fay history of intermittent midsternal chest pressure. Pt reports symptoms have been occuring about 2 hours after eating. Pt has PMH of CAD s/p stent in 2014, hypertension, hyperlipidemia, and DM.    -HK        Existing Precautions/Restrictions  fall  -HK        Risks Reviewed  patient:;LOB;nausea/vomiting;dizziness;increased discomfort;change in vital signs;increased drainage;lines disloged  -HK        Benefits Reviewed  patient:;improve function;increase independence;increase strength;decrease risk of DVT;decrease pain;increase balance;improve skin integrity;increase knowledge  -HK        Barriers to Rehab  none identified  -HK           Relationship/Environment    Primary Source of Support/Comfort  child(jaime)  -HK        Lives With  child(jaime), adult  -HK           Resource/Environmental Concerns    Current Living Arrangements  home/apartment/condo  -HK           Home Main Entrance    Number of Stairs, Main Entrance  three  -HK        Stair Railings, Main Entrance  railing on left side (ascending)  -HK           Cognitive  Assessment/Interventions    Additional Documentation  Cognitive Assessment/Intervention (Group)  -HK           Cognitive Assessment/Intervention- PT/OT    Affect/Mental Status (Cognitive)  WFL  -HK        Orientation Status (Cognition)  oriented x 4  -HK        Follows Commands (Cognition)  follows one step commands;over 90% accuracy  -HK        Cognitive Function (Cognitive)  safety deficit  -HK        Safety Deficit (Cognitive)  mild deficit;safety precautions awareness;safety precautions follow-through/compliance  -HK        Personal Safety Interventions  fall prevention program maintained;gait belt;nonskid shoes/slippers when out of bed  -HK           Safety Issues, Functional Mobility    Safety Issues Affecting Function (Mobility)  safety precautions follow-through/compliance;safety precaution awareness  -HK        Impairments Affecting Function (Mobility)  balance;strength;endurance/activity tolerance;shortness of breath  -HK           Bed Mobility Assessment/Treatment    Bed Mobility Assessment/Treatment  sit-supine  -HK        Sit-Supine [place] (Bed Mobility)  contact guard  -HK        Bed Mobility, Safety Issues  decreased use of legs for bridging/pushing  -HK        Assistive Device (Bed Mobility)  bed rails  -HK           Functional Mobility    Functional Mobility- Ind. Level  contact guard assist  -HK        Functional Mobility- Device  rolling walker  -HK        Functional Mobility-Distance (Feet)  160  -HK        Functional Mobility- Safety Issues  weight-shifting ability decreased;step length decreased  -HK           Transfer Assessment/Treatment    Transfer Assessment/Treatment  sit-stand transfer;stand-sit transfer;toilet transfer  -HK        Comment (Transfers)  verbal cues for safe hand placemenet   -HK           Sit-Stand Transfer    Sit-Stand [place] (Transfers)  contact guard;verbal cues  -HK        Assistive Device (Sit-Stand Transfers)  walker, front-wheeled  -HK            Stand-Sit Transfer    Stand-Sit [place] (Transfers)  contact guard  -HK        Assistive Device (Stand-Sit Transfers)  crutches, forearm;walker, front-wheeled  -HK           Toilet Transfer    Type (Toilet Transfer)  stand-sit;sit-stand  -HK        [place] Level (Toilet Transfer)  contact guard;verbal cues  -HK        Assistive Device (Toilet Transfer)  walker, front-wheeled;raised toilet seat;grab bars/safety frame  -HK           ADL Assessment/Intervention    BADL Assessment/Intervention  lower body dressing;toileting;bathing  -HK           Bathing Assessment/Intervention    Comment (Bathing)  OT educated pt on use of tub transfer bench for tub transfers and to increase safety.   -HK           Lower Body Dressing Assessment/Training    Lower Body Dressing [place] Level  don;shoes/slippers;supervision  -HK        Lower Body Dressing Position  unsupported sitting  -HK        Comment (Lower Body Dressing)  Pt able to bend to floor to don slip on shoes  -HK           Toileting Assessment/Training    [place] Level (Toileting)  toileting skills;supervision  -HK        Toileting Position  unsupported sitting  -HK           BADL Safety/Performance    Impairments, BADL Safety/Performance  balance;endurance/activity tolerance;strength;shortness of breath  -HK           General ROM    RT Upper Ext  Rt Shoulder Flexion  -HK        LT Upper Ext  Lt Shoulder Flexion  -HK           Right Upper Ext    Rt Shoulder Flexion AROM  grossly 100 degrees AROM   -HK           Left Upper Ext    Lt Shoulder Flexion AROM  grossly 100 degrees AROM   -HK           MMT (Manual Muscle Testing)    Rt Upper Ext  Rt Shoulder Flexion  -HK        Lt Upper Ext  Lt Shoulder Flexion  -HK           MMT Right Upper Ext    Rt Shoulder Flexion MMT, Gross Movement  (3+/5) fair plus  -HK           MMT Left Upper Ext    Lt Shoulder Flexion MMT, Gross Movement  (3+/5) fair plus  -HK           Motor Assessment/Interventions    Additional  Documentation  Balance (Group)  -HK           Sensory Assessment/Intervention    Sensory General Assessment  light touch sensation deficits identified  -HK           Light Touch Sensation Assessment    Left Upper Extremity: Light Touch Sensation Assessment  mild impairment, 75% or more correct responses  -HK        Comment, Left Upper Extremity: Light Touch Sensation Assessment  numbness and tingling in hands and fingers   -HK        Right Upper Extremity: Light Touch Sensation Assessment  mild impairment, 75% or more correct responses  -HK        Comment, Right Upper Extremity: Light Touch Sensation Assessment  numbness and tingling in hands and fingers   -HK           Positioning and Restraints    Pre-Treatment Position  sitting in chair/recliner  -HK        Post Treatment Position  bed  -HK        In Bed  notified nsg;supine;call light within reach;encouraged to call for assist  -HK           Pain Assessment    Additional Documentation  Pain Scale: Numbers Pre/Post-Treatment (Group)  -HK           Pain Scale: Numbers Pre/Post-Treatment    Pain Scale: Numbers, Pretreatment  0/10 - no pain  -HK        Pain Scale: Numbers, Post-Treatment  0/10 - no pain  -HK           Coping    Observed Emotional State  accepting;calm  -HK           Plan of Care Review    Plan of Care Reviewed With  patient  -HK           Clinical Impression (OT)    Date of Referral to OT  11/13/18  -HK        OT Diagnosis  Decreased independence with ADLs and Mobility   -HK        Patient/Family Goals Statement (OT Eval)  Pt would like to improve and return home.   -HK        Criteria for Skilled Therapeutic Interventions Met (OT Eval)  yes;treatment indicated  -HK        Rehab Potential (OT Eval)  good, to achieve stated therapy goals  -HK        Therapy Frequency (OT Eval)  daily  -HK        Care Plan Review (OT)  evaluation/treatment results reviewed;care plan/treatment goals reviewed;patient/other agree to care plan  -HK        Anticipated  Equipment Needs at Discharge (OT)  tub bench  -HK        Anticipated Discharge Disposition (OT)  inpatient rehabilitation facility  -HK           Vital Signs    Pre Systolic BP Rehab  -- RN cleared for tx  -HK        Pre Patient Position  Sitting  -HK        Intra Patient Position  Standing  -HK        Post Patient Position  Supine  -HK           OT Goals    Bed Mobility Goal Selection (OT)  bed mobility, OT goal 1  -HK        Transfer Goal Selection (OT)  transfer, OT goal 1  -HK        Dressing Goal Selection (OT)  dressing, OT goal 1  -HK        Grooming Goal Selection (OT)  grooming, OT goal 1  -HK        Additional Documentation  Grooming Goal Selection (OT) (Row)  -HK           Bed Mobility Goal 1 (OT)    Activity/Assistive Device (Bed Mobility Goal 1, OT)  sit to supine/supine to sit;scooting  -HK        [place] Level/Cues Needed (Bed Mobility Goal 1, OT)  supervision required  -HK        Time Frame (Bed Mobility Goal 1, OT)  5 days  -HK        Progress/Outcomes (Bed Mobility Goal 1, OT)  goal ongoing  -HK           Transfer Goal 1 (OT)    Activity/Assistive Device (Transfer Goal 1, OT)  sit-to-stand/stand-to-sit;toilet  -HK        [place] Level/Cues Needed (Transfer Goal 1, OT)  supervision required  -HK        Time Frame (Transfer Goal 1, OT)  5 days  -HK        Progress/Outcome (Transfer Goal 1, OT)  goal ongoing  -HK           Dressing Goal 1 (OT)    Activity/Assistive Device (Dressing Goal 1, OT)  lower body dressing  -HK        [place]/Cues Needed (Dressing Goal 1, OT)  independent  -HK        Time Frame (Dressing Goal 1, OT)  5 days  -HK        Progress/Outcome (Dressing Goal 1, OT)  goal ongoing  -HK           Grooming Goal 1 (OT)    Activity/Device (Grooming Goal 1, OT)  hair care;oral care;wash face, hands  -HK        [place] (Grooming Goal 1, OT)  minimum assist (75% or more patient effort);verbal cues required  -HK        Time Frame (Grooming Goal 1, OT)  5 days  -HK         Progress/Outcome (Grooming Goal 1, OT)  goal ongoing  -           Living Environment    Home Accessibility  stairs to enter home;tub/shower is not walk in  -          User Key  (r) = Recorded By, (t) = Taken By, (c) = Cosigned By    Initials Name Effective Dates     Iman Feliciano OT 03/07/18 -          Occupational Therapy Education     Title: PT OT SLP Therapies (Active)     Topic: Occupational Therapy (Active)     Point: ADL training (Done)     Description: Instruct learner(s) on proper safety adaptation and remediation techniques during self care or transfers.   Instruct in proper use of assistive devices.    Learning Progress Summary           Patient Acceptance, E,TB, VU by  at 11/13/2018  2:25 PM    Comment:  Pt educated on ADL retraining with LBD and toileting, safety precautions, and appropriate body mechanics.                   Point: Precautions (Done)     Description: Instruct learner(s) on prescribed precautions during self-care and functional transfers.    Learning Progress Summary           Patient Acceptance, E,TB, VU by  at 11/13/2018  2:25 PM    Comment:  Pt educated on ADL retraining with LBD and toileting, safety precautions, and appropriate body mechanics.                   Point: Body mechanics (Done)     Description: Instruct learner(s) on proper positioning and spine alignment during self-care, functional mobility activities and/or exercises.    Learning Progress Summary           Patient Acceptance, E,TB, VU by  at 11/13/2018  2:25 PM    Comment:  Pt educated on ADL retraining with LBD and toileting, safety precautions, and appropriate body mechanics.                               User Key     Initials Effective Dates Name Provider Type Discipline     03/07/18 -  Iman Feliciano, OT Occupational Therapist OT                  OT Recommendation and Plan  Outcome Summary/Treatment Plan (OT)  Anticipated Equipment Needs at Discharge (OT): tub bench  Anticipated Discharge Disposition  (OT): inpatient rehabilitation facility  Therapy Frequency (OT Eval): daily  Plan of Care Review  Plan of Care Reviewed With: patient  Plan of Care Reviewed With: patient  Outcome Summary: OT eval complete. Pt compelted LBD and toileting with supervision. Pt completes sit to stand with CGA and ambulates with CGA and RW. Pt demonstrates decreased BUE strength and activity tolerance. Recommend discharge to [facility] prior to returning home.     Outcome Measures     Row Name 11/13/18 1425             How much help from another is currently needed...    Putting on and taking off regular lower body clothing?  3  -HK      Bathing (including washing, rinsing, and drying)  3  -HK      Toileting (which includes using toilet bed pan or urinal)  3  -HK      Putting on and taking off regular upper body clothing  4  -HK      Taking care of personal grooming (such as brushing teeth)  3  -HK      Eating meals  4  -HK      Score  20  -HK         Functional Assessment    Outcome Measure Options  AM-PAC 6 Clicks Daily Activity (OT)  -HK        User Key  (r) = Recorded By, (t) = Taken By, (c) = Cosigned By    Initials Name Provider Type    Iman Peterson OT Occupational Therapist          Time Calculation:   Time Calculation- OT     Row Name 11/13/18 1425             Time Calculation- OT    OT Start Time  1425  -HK      OT Received On  11/13/18  -      OT Goal Re-Cert Due Date  11/23/18  -        User Key  (r) = Recorded By, (t) = Taken By, (c) = Cosigned By    Initials Name Provider Type    Iman Peterson OT Occupational Therapist        Therapy Suggested Charges     Code   Minutes Charges    None           Therapy Charges for Today     Code Description Service Date Service Provider Modifiers Qty    [id number]  OT EVAL MOD COMPLEXITY 4 11/13/2018 Iman Feliciano OT GO 1               Iman Feliciano OT  11/13/2018

## 2018-11-13 NOTE — THERAPY EVALUATION
Acute Care - Physical Therapy Initial Evaluation  [facility]     Patient Name: Fani Bird  : 1935  MRN: [id number]  Today's Date: 2018   Onset of Illness/Injury or Date of Surgery: 18  Date of Referral to PT: 18  Referring Physician: MD Bishop      Admit Date: 2018    Visit Dx:     ICD-10-CM ICD-9-CM   1. NSTEMI (non-ST elevated myocardial infarction) (CMS/HCC) I21.4 410.70   2. Impaired mobility and ADLs Z74.09 799.89   3. Impaired functional mobility, balance, gait, and endurance Z74.09 V49.89     Patient Active Problem List   Diagnosis   • Chest pain   • Cerebrovascular disease   • Coronary artery disease   • Hypertension   • Dyslipidemia   • GERD (gastroesophageal reflux disease)   • Vitamin D deficiency   • Diabetes mellitus (CMS/[facility])   • Dyspnea   • NSTEMI (non-ST elevated myocardial infarction) (CMS/[facility])     Past Medical History:   Diagnosis Date   • Cataract    • Cerebrovascular disease    • Chest pain    • Coronary artery disease     no recent ischemic evaluation   • Diabetes mellitus (CMS/[facility])     Hemoglobin A1c of 8.3.   • Dyslipidemia     May 2012 - Total 156, triglycerides 297, HDL 47, and LDL 54.   • Dyspnea    • GERD (gastroesophageal reflux disease)    • Hypertension    • Pneumonia    • Swelling of lower extremity     improved with stockings   • Vitamin D deficiency      Past Surgical History:   Procedure Laterality Date   • BLADDER REPAIR     • BREAST BIOPSY     • BREAST CYST ASPIRATION     • CARDIAC CATHETERIZATION     • CARPAL TUNNEL RELEASE Bilateral    • CATARACT EXTRACTION     • CHOLECYSTECTOMY     • CORONARY STENT PLACEMENT     • HYSTERECTOMY     • SPINAL FUSION          PT ASSESSMENT (last 12 hours)      Physical Therapy Evaluation     Row Name 18 1437          PT Evaluation Time/Intention    Subjective Information  complains of;weakness  -TOY     Document Type  evaluation  -TOY     Mode of Treatment  physical therapy  -TOY     Patient Effort   good  -TOY     Symptoms Noted During/After Treatment  fatigue;shortness of breath  -TOY     Row Name 11/13/18 1437          General Information    Patient Profile Reviewed?  yes  -TOY     Onset of Illness/Injury or Date of Surgery  11/09/18  -TOY     Referring Physician  MD Bishop  -TOY     Patient Observations  alert;cooperative;agree to therapy  -TOY     Patient/Family Observations  no family present  -TOY     Prior Level of Function  independent:;gait;transfer;bed mobility;ADL's  -TOY     Equipment Currently Used at Home  shower chair;walker, rolling;raised toilet;grab bar  -TOY     Pertinent History of Current Functional Problem  patient had 3 day hx of chest pain she went to her local hosp and was dx with non STEMI and was transferred to [facility]  -TOY     Existing Precautions/Restrictions  cardiac  -TOY     Risks Reviewed  patient:;LOB;increased discomfort  -TOY     Benefits Reviewed  patient:;improve function;increase independence;increase strength;decrease risk of DVT  -TOY     Row Name 11/13/18 1437          Relationship/Environment    Lives With  child(jaime), adult  -TOY     Row Name 11/13/18 1437          Resource/Environmental Concerns    Current Living Arrangements  home/apartment/condo  -TYO     Resource/Environmental Concerns  none  -TOY     Row Name 11/13/18 1437          Cognitive Assessment/Intervention- PT/OT    Orientation Status (Cognition)  oriented x 4  -TOY     Follows Commands (Cognition)  WFL  -TOY     Safety Deficit (Cognitive)  safety precautions awareness;safety precautions follow-through/compliance  -TOY     Row Name 11/13/18 1437          Safety Issues, Functional Mobility    Safety Issues Affecting Function (Mobility)  safety precautions follow-through/compliance;safety precaution awareness  -TOY     Impairments Affecting Function (Mobility)  balance;endurance/activity tolerance;strength  -TOY     Row Name 11/13/18 1437          Bed Mobility Assessment/Treatment    Bed Mobility Assessment/Treatment  sit-supine   -TOY     Sit-Supine [place] (Bed Mobility)  contact guard  -TOY     Bed Mobility, Safety Issues  decreased use of legs for bridging/pushing  -     Assistive Device (Bed Mobility)  bed rails;draw sheet  -     Row Name 11/13/18 1437          Transfer Assessment/Treatment    Transfer Assessment/Treatment  sit-stand transfer;stand-sit transfer;toilet transfer  -     Sit-Stand [place] (Transfers)  contact guard  -TOY     Stand-Sit [place] (Transfers)  contact guard  -TOY     [place] Level (Toilet Transfer)  contact guard  -TOY     Assistive Device (Toilet Transfer)  commode  -     Row Name 11/13/18 1437          Sit-Stand Transfer    Assistive Device (Sit-Stand Transfers)  walker, front-wheeled  -TOY     Row Name 11/13/18 1437          Stand-Sit Transfer    Assistive Device (Stand-Sit Transfers)  walker, front-wheeled  -     Row Name 11/13/18 1437          Toilet Transfer    Type (Toilet Transfer)  sit-stand;stand-sit  -     Row Name 11/13/18 1437          Gait/Stairs Assessment/Training    Gait/Stairs Assessment/Training  gait/ambulation independence;gait/ambulation assistive device  -     [place] Level (Gait)  contact guard  -TOY     Assistive Device (Gait)  walker, front-wheeled  -TOY     Distance in Feet (Gait)  150  -TOY     Pattern (Gait)  step-through  -TOY     Comment (Gait/Stairs)  patient needs cues for longer strides patient also takes extra time in turning  -     Row Name 11/13/18 1437          General ROM    GENERAL ROM COMMENTS  no ROM deficits  -     Row Name 11/13/18 1437          MMT (Manual Muscle Testing)    General MMT Comments  generalized weakness 4-/5  -[facility] Name 11/13/18 1437          Motor Assessment/Intervention    Additional Documentation  Balance (Group);Therapeutic Exercise (Group);Therapeutic Exercise Interventions (Group)  -     Row Name 11/13/18 1437          Therapeutic Exercise    Therapeutic Exercise  seated, lower extremities;seated, upper  extremities  -TOY     Additional Documentation  Therapeutic Exercise (Row)  -     Row Name 11/13/18 1437          Balance    Balance  static sitting balance;static standing balance  -     Row Name 11/13/18 1437          Static Sitting Balance    Level of [place] (Unsupported Sitting, Static Balance)  contact guard assist  -TOY     Sitting Position (Unsupported Sitting, Static Balance)  sitting on edge of bed  -TOY     Time Able to Maintain Position (Unsupported Sitting, Static Balance)  more than 5 minutes  -TOY     Row Name 11/13/18 1437          Static Standing Balance    Level of [place] (Supported Standing, Static Balance)  contact guard assist  -TOY     Time Able to Maintain Position (Supported Standing, Static Balance)  1 to 2 minutes  -TOY     Assistive Device Utilized (Supported Standing, Static Balance)  rolling walker  -     Row Name 11/13/18 1437          Pain Assessment    Additional Documentation  Pain Scale: Numbers Pre/Post-Treatment (Group)  -     Row Name 11/13/18 1437          Pain Scale: Numbers Pre/Post-Treatment    Pain Scale: Numbers, Pretreatment  2/10  -TOY     Pain Scale: Numbers, Post-Treatment  4/10  -TOY     Pain Location - Side  Right  -TOY     Pain Location  shoulder  -     Pain Intervention(s)  Heat applied;Repositioned;Ambulation/increased activity  -     Row Name 11/13/18 1437          Coping    Observed Emotional State  calm;cooperative  -     Verbalized Emotional State  acceptance  -     Row Name 11/13/18 1437          Plan of Care Review    Plan of Care Reviewed With  patient  -     Row Name 11/13/18 1437          Physical Therapy Clinical Impression    Date of Referral to PT  11/13/18  -TOY     PT Diagnosis (PT Clinical Impression)  impaired bed mobility transfer and gait  -     Patient/Family Goals Statement (PT Clinical Impression)  possible short rehab stay  -     Criteria for Skilled Interventions Met (PT Clinical Impression)  yes;treatment indicated   -TOY     Rehab Potential (PT Clinical Summary)  good, to achieve stated therapy goals  -TOY     Care Plan Review (PT)  evaluation/treatment results reviewed;care plan/treatment goals reviewed;risks/benefits reviewed;patient/other agree to care plan  -     Row Name 11/13/18 1437          Physical Therapy Goals    Bed Mobility Goal Selection (PT)  bed mobility, PT goal 1  -TOY     Transfer Goal Selection (PT)  transfer, PT goal 1  -TOY     Gait Training Goal Selection (PT)  gait training, PT goal 1  -     Row Name 11/13/18 1437          Bed Mobility Goal 1 (PT)    Activity/Assistive Device (Bed Mobility Goal 1, PT)  sit to supine/supine to sit  -TOY     [place] Level/Cues Needed (Bed Mobility Goal 1, PT)  independent  -TOY     Time Frame (Bed Mobility Goal 1, PT)  long term goal (LTG);10 days  -TOY     Progress/Outcomes (Bed Mobility Goal 1, PT)  goal ongoing  -     Row Name 11/13/18 1437          Transfer Goal 1 (PT)    Activity/Assistive Device (Transfer Goal 1, PT)  sit-to-stand/stand-to-sit  -TOY     [place] Level/Cues Needed (Transfer Goal 1, PT)  independent  -TOY     Time Frame (Transfer Goal 1, PT)  long term goal (LTG);10 days  -TOY     Progress/Outcome (Transfer Goal 1, PT)  goal ongoing  -     Row Name 11/13/18 1437          Gait Training Goal 1 (PT)    Activity/Assistive Device (Gait Training Goal 1, PT)  gait (walking locomotion);assistive device use;walker, rolling  -TOY     [place] Level (Gait Training Goal 1, PT)  independent  -TOY     Distance (Gait Goal 1, PT)  400  -TOY     Time Frame (Gait Training Goal 1, PT)  long term goal (LTG);10 days  -TOY     Progress/Outcome (Gait Training Goal 1, PT)  goal ongoing  -     Row Name 11/13/18 1437          Patient Education Goal (PT)    Activity (Patient Education Goal, PT)  HEP  -TOY     [place]/Cues/Accuracy (Memory Goal 2, PT)  verbalizes understanding  -TOY     Time Frame (Patient Education Goal, PT)  long term goal (LTG);10 days  -TOY      Progress/Outcome (Patient Education Goal, PT)  goal ongoing  -     Row Name 11/13/18 1437          Positioning and Restraints    Pre-Treatment Position  sitting in chair/recliner  -     Post Treatment Position  bed  -TOY     In Bed  notified nsg;supine;call light within reach  -       User Key  (r) = Recorded By, (t) = Taken By, (c) = Cosigned By    Initials Name Provider Type    Bernarda Wallis PT Physical Therapist        Physical Therapy Education     Title: PT OT SLP Therapies (Active)     Topic: Physical Therapy (Active)     Point: Mobility training (Active)     Learning Progress Summary           Patient Acceptance, E, NR by TOY at 11/13/2018  2:37 PM                   Point: Home exercise program (Active)     Learning Progress Summary           Patient Acceptance, E, NR by TOY at 11/13/2018  2:37 PM                   Point: Body mechanics (Active)     Learning Progress Summary           Patient Acceptance, E, NR by TOY at 11/13/2018  2:37 PM                   Point: Precautions (Active)     Learning Progress Summary           Patient Acceptance, E, NR by TOY at 11/13/2018  2:37 PM                               User Key     Initials Effective Dates Name Provider Type Discipline    TOY 06/19/15 -  Bernarda Montgomery PT Physical Therapist PT              PT Recommendation and Plan  Anticipated Discharge Disposition (PT): inpatient rehabilitation facility  Planned Therapy Interventions (PT Eval): balance training, bed mobility training, gait training, home exercise program, transfer training, strengthening  Therapy Frequency (PT Clinical Impression): daily  Outcome Summary/Treatment Plan (PT)  Anticipated Discharge Disposition (PT): inpatient rehabilitation facility  Plan of Care Reviewed With: patient  Outcome Summary: PT eval completed patient is able to ambulate 150 ft with walker with CGA. patient fatigues easily and would benefit from IPR prior t oreturning home  Outcome Measures     Row Name  11/13/18 1437 11/13/18 1425          How much help from another person do you currently need...    Turning from your back to your side while in flat bed without using bedrails?  4  -TOY  --     Moving from lying on back to sitting on the side of a flat bed without bedrails?  3  -TOY  --     Moving to and from a bed to a chair (including a wheelchair)?  3  -TOY  --     Standing up from a chair using your arms (e.g., wheelchair, bedside chair)?  3  -TOY  --     Climbing 3-5 steps with a railing?  2  -TOY  --     To walk in hospital room?  3  -TOY  --     AM-PAC 6 Clicks Score  18  -TOY  --        How much help from another is currently needed...    Putting on and taking off regular lower body clothing?  --  3  -HK     Bathing (including washing, rinsing, and drying)  --  3  -HK     Toileting (which includes using toilet bed pan or urinal)  --  3  -HK     Putting on and taking off regular upper body clothing  --  4  -HK     Taking care of personal grooming (such as brushing teeth)  --  3  -HK     Eating meals  --  4  -HK     Score  --  20  -HK        Functional Assessment    Outcome Measure Options  AM-PAC 6 Clicks Basic Mobility (PT)  -TOY  AM-PAC 6 Clicks Daily Activity (OT)  -       User Key  (r) = Recorded By, (t) = Taken By, (c) = Cosigned By    Initials Name Provider Type    Bernarda Wallis PT Physical Therapist    HK Iman Feliciano, OT Occupational Therapist         Time Calculation:   PT Charges     Row Name 11/13/18 1545             Time Calculation    Start Time  1437  -TOY      PT Received On  11/13/18  -TOY      PT Goal Re-Cert Due Date  11/23/18  -TOY        User Key  (r) = Recorded By, (t) = Taken By, (c) = Cosigned By    Initials Name Provider Type    Bernarda Wallis PT Physical Therapist        Therapy Suggested Charges     Code   Minutes Charges    None           Therapy Charges for Today     Code Description Service Date Service Provider Modifiers Qty    [id number] HC PT EVAL MOD COMPLEXITY 4  11/13/2018 Bernarda Montgomery, PT GP 1          PT G-Codes  Outcome Measure Options: AM-PAC 6 Clicks Basic Mobility (PT)  AM-PAC 6 Clicks Score: 18  Score: 20      Bernarda Montgomery, PT  11/13/2018

## 2018-11-13 NOTE — PLAN OF CARE
Problem: Patient Care Overview  Goal: Plan of Care Review  Outcome: Ongoing (interventions implemented as appropriate)   11/13/18 7514   Coping/Psychosocial   Plan of Care Reviewed With patient   Plan of Care Review   Progress improving   OTHER   Outcome Summary VSS, ambulates with one and the walker. No c/o pain this shift. Will continue to monitor.       Problem: Fall Risk (Adult)  Goal: Identify Related Risk Factors and Signs and Symptoms  Outcome: Ongoing (interventions implemented as appropriate)    Goal: Absence of Fall  Outcome: Ongoing (interventions implemented as appropriate)      Problem: Cardiac: ACS (Acute Coronary Syndrome) (Adult)  Goal: Signs and Symptoms of Listed Potential Problems Will be Absent, Minimized or Managed (Cardiac: ACS)  Outcome: Ongoing (interventions implemented as appropriate)

## 2018-11-13 NOTE — PLAN OF CARE
Problem: Patient Care Overview  Goal: Plan of Care Review  Outcome: Ongoing (interventions implemented as appropriate)   11/13/18 7444   Coping/Psychosocial   Plan of Care Reviewed With patient   Plan of Care Review   Progress improving   OTHER   Outcome Summary OT eval complete. Pt compelted LBD and toileting with supervision. Pt completes sit to stand with CGA and ambulates with CGA and RW. Pt demonstrates decreased BUE strength and activity tolerance. Recommend discharge to [facility] prior to returning home.

## 2018-11-13 NOTE — PROGRESS NOTES
Continued Stay Note  [facility]     Patient Name: Fani Bird  MRN: [id number]  Today's Date: 11/13/2018    Admit Date: 11/9/2018    Discharge Plan     Row Name 11/13/18 1130       Plan    Plan  placement    Patient/Family in Agreement with Plan  yes    Plan Comments  Pt may not have procedure - but still would like to pursue rehab for PT/OT r/t weakness.  PT/OT ordered today.  Spoke rivas Silva at [facility] [phone].  CM will need to fax referral and call back when PT/OT notes are in-probably tomorrow. CM will continue to follow.        Discharge Codes    No documentation.             Julienne Salmeron RN

## 2018-11-13 NOTE — PLAN OF CARE
Problem: Patient Care Overview  Goal: Plan of Care Review  Outcome: Ongoing (interventions implemented as appropriate)   11/13/18 6037   Coping/Psychosocial   Plan of Care Reviewed With patient   Plan of Care Review   Progress no change   OTHER   Outcome Summary Pt. was anxious during the evening regarding her medication; provider notified; room air during the evening, but felt like she needed oxygen at night; very particular with repeated requests for staff to do things for her; good output during the shift- 1750 out so far; NSR; no other problems identified; continue to monitor.

## 2018-11-13 NOTE — PLAN OF CARE
Problem: Patient Care Overview  Goal: Plan of Care Review  Outcome: Ongoing (interventions implemented as appropriate)   11/13/18 2939   Coping/Psychosocial   Plan of Care Reviewed With patient   OTHER   Outcome Summary PT eval completed patient is able to ambulate 150 ft with walker with CGA. patient fatigues easily and would benefit from IPR prior t oreturning home

## 2018-11-13 NOTE — PROGRESS NOTES
"[facility] Progress Note     LOS: 3 days   Patient Care Team:  Emerson Alvarez MD as PCP - General (Family Medicine)  Emerson Alvarez MD as PCP - Claims Attributed  PCP:  Emerson Alvarez MD    Chief Complaint:  Fu nstemi    SUBJECTIVE:   Pt reports no further CP. Feels weak. Some stable dyspnea. BP high at present prior to morning rx.       Review of Systems:   All systems have been reviewed and are negative with the exception of those mentioned above.      OBJECTIVE:    Vital Sign Min/Max for last 24 hours  Temp  Min: 97.7 °F (36.5 °C)  Max: 98.9 °F (37.2 °C)   BP  Min: 125/50  Max: 179/88   Pulse  Min: 73  Max: 84   Resp  Min: 16  Max: 18   SpO2  Min: 95 %  Max: 96 %   No Data Recorded   No Data Recorded     Flowsheet Rows      First Filed Value   Admission Height  154.9 cm (60.98\") Documented at 11/12/2018 0456   Admission Weight  57.6 kg (127 lb) Documented at 11/09/2018 2300          Telemetry: sr      Intake/Output Summary (Last 24 hours) at 11/13/2018 0802  Last data filed at 11/13/2018 0600  Gross per 24 hour   Intake 590 ml   Output 2650 ml   Net -2060 ml     Intake & Output (last 3 days)       11/10 0701 - 11/11 0700 11/11 0701 - 11/12 0700 11/12 0701 - 11/13 0700 11/13 0701 - 11/14 0700    P.O. 720 1080 590     Total Intake(mL/kg) 720 (12.5) 1080 (18.8) 590 (10.2)     Urine (mL/kg/hr) 2050 (1.5) 3400 (2.5) 2650 (1.9)     Stool  0 0     Total Output 2050 3400 2650     Net -1330 -2320 -2060             Stool Unmeasured Occurrence  1 x 1 x            Physical Exam:  Physical Exam   A&O X 3  s1s2 hsm   Chest decreased  abd soft +bs  Ext no edema    LABS/DIAGNOSTIC DATA:  Results from last 7 days   Lab Units  11/10/18   0546  11/10/18   0019   WBC 10*3/mm3  9.36  9.62   HEMOGLOBIN g/dL  11.4*  12.8   HEMATOCRIT %  34.8  39.4   PLATELETS 10*3/mm3  221  239     No results found for: TROPONINT      Results from last 7 days   Lab Units  11/11/18   0418  11/10/18   0546  " 11/10/18   0019   SODIUM mmol/L  137  136  136   POTASSIUM mmol/L  4.0  3.5  3.7   CHLORIDE mmol/L  102  100  98*   CO2 mmol/L  31.0  27.0  30.0   BUN mg/dL  17  16  16   CREATININE mg/dL  0.77  0.62  0.75   CALCIUM mg/dL  8.3*  8.9  9.6   BILIRUBIN mg/dL   --    --   0.2*   ALK PHOS U/L   --    --   74   ALT (SGPT) U/L   --    --   30   AST (SGOT) U/L   --    --   52*   GLUCOSE mg/dL  238*  203*  270*     Results from last 7 days   Lab Units  11/10/18   0546   HEMOGLOBIN A1C %  8.30*     Results from last 7 days   Lab Units  11/11/18   0418   CHOLESTEROL mg/dL  137   TRIGLYCERIDES mg/dL  278*   HDL CHOL mg/dL  36*   LDL CHOL mg/dL  78               Medication Review:     amLODIPine 10 mg Oral Daily   aspirin 81 mg Oral Daily   atorvastatin 80 mg Oral Daily   budesonide 0.5 mg Nebulization BID - RT   carBAMazepine 100 mg Oral BID   carvedilol 3.125 mg Oral Q12H   chlorthalidone 25 mg Oral Daily   cholecalciferol 1,000 Units Oral Daily   clopidogrel 75 mg Oral Daily   cycloSPORINE 1 drop Both Eyes BID   diazePAM 2 mg Oral Nightly   enoxaparin 1 mg/kg Subcutaneous Q12H   ferrous sulfate 650 mg Oral Daily With Breakfast   fexofenadine 180 mg Oral Q12H   gabapentin 400 mg Oral 4x Daily   insulin detemir 12 Units Subcutaneous Q12H   insulin lispro 0-7 Units Subcutaneous 4x Daily With Meals & Nightly   insulin lispro 5 Units Subcutaneous TID With Meals   losartan 100 mg Oral Daily   montelukast 10 mg Oral Nightly   Morphine 30 mg Oral Q12H   OCUVITE-LUTEIN 1 tablet Oral Daily   oxybutynin XL 5 mg Oral Daily   pantoprazole 40 mg Oral Q AM   pharmacy consult - MTM  Does not apply Daily   sodium chloride 3 mL Intravenous Q12H   tiZANidine 4 mg Oral Nightly   vitamin B-12 1,000 mcg Oral Daily   ascorbic acid 1,000 mg Oral Daily        nitroglycerin 10-50 mcg/min Last Rate: Stopped (11/10/18 1626)   sodium chloride 1-3 mL/kg/hr Last Rate: Stopped (11/10/18 2434)          NSTEMI (non-ST elevated myocardial infarction)  (CMS/[facility])    Chest pain    Cerebrovascular disease    Coronary artery disease    Hypertension    Dyslipidemia    GERD (gastroesophageal reflux disease)    Diabetes mellitus (CMS/[facility])      ASSESSMENT/PLAN:  nstemi w distal LM/ostial CX ISS/ostial LAD calcified stenosis EF 60%  -Pt poor candidate for CABG due to dismal arthritis and functional capacity.  After discussion w patient and daughter they wish to pursue non surgical options.  -DAPT and high intensity statin added    Electronically signed by Mely Gomez PA-C, 11/13/18, 8:41 AM as scribe for Dr Chairez.  I, nathanael chairez md, personally performed the services described in this documentation as scribed by the above named individual in my presence, and it is both accurate and complete.  11/13/2018  5:28 PM

## 2018-11-13 NOTE — PROGRESS NOTES
Order received for Phase II Cardiac Rehab. Staff will continue to follow throughout hospital stay.

## 2018-11-14 VITALS
TEMPERATURE: 98.1 F | OXYGEN SATURATION: 97 % | BODY MASS INDEX: 23.98 KG/M2 | RESPIRATION RATE: 16 BRPM | SYSTOLIC BLOOD PRESSURE: 136 MMHG | DIASTOLIC BLOOD PRESSURE: 69 MMHG | HEIGHT: 61 IN | WEIGHT: 127 LBS | HEART RATE: 73 BPM

## 2018-11-14 PROBLEM — I21.4 NSTEMI (NON-ST ELEVATED MYOCARDIAL INFARCTION) (HCC): Status: RESOLVED | Noted: 2018-11-10 | Resolved: 2018-11-14

## 2018-11-14 LAB
ANION GAP SERPL CALCULATED.3IONS-SCNC: 10 MMOL/L (ref 3–11)
BASOPHILS # BLD AUTO: 0.03 10*3/MM3 (ref 0–0.2)
BASOPHILS NFR BLD AUTO: 0.4 % (ref 0–1)
BNP SERPL-MCNC: 165 PG/ML (ref 0–100)
BUN BLD-MCNC: 22 MG/DL (ref 9–23)
BUN/CREAT SERPL: 32.8 (ref 7–25)
CALCIUM SPEC-SCNC: 8.9 MG/DL (ref 8.7–10.4)
CHLORIDE SERPL-SCNC: 95 MMOL/L (ref 99–109)
CO2 SERPL-SCNC: 27 MMOL/L (ref 20–31)
CREAT BLD-MCNC: 0.67 MG/DL (ref 0.6–1.3)
DEPRECATED RDW RBC AUTO: 41.7 FL (ref 37–54)
EOSINOPHIL # BLD AUTO: 0.4 10*3/MM3 (ref 0–0.3)
EOSINOPHIL NFR BLD AUTO: 4.8 % (ref 0–3)
ERYTHROCYTE [DISTWIDTH] IN BLOOD BY AUTOMATED COUNT: 12.7 % (ref 11.3–14.5)
GFR SERPL CREATININE-BSD FRML MDRD: 84 ML/MIN/1.73
GLUCOSE BLD-MCNC: 186 MG/DL (ref 70–100)
GLUCOSE BLDC GLUCOMTR-MCNC: 196 MG/DL (ref 70–130)
GLUCOSE BLDC GLUCOMTR-MCNC: 279 MG/DL (ref 70–130)
GLUCOSE BLDC GLUCOMTR-MCNC: 329 MG/DL (ref 70–130)
HCT VFR BLD AUTO: 33.1 % (ref 34.5–44)
HGB BLD-MCNC: 10.9 G/DL (ref 11.5–15.5)
IMM GRANULOCYTES # BLD: 0.04 10*3/MM3 (ref 0–0.03)
IMM GRANULOCYTES NFR BLD: 0.5 % (ref 0–0.6)
LYMPHOCYTES # BLD AUTO: 2.03 10*3/MM3 (ref 0.6–4.8)
LYMPHOCYTES NFR BLD AUTO: 24.2 % (ref 24–44)
MCH RBC QN AUTO: 29.7 PG (ref 27–31)
MCHC RBC AUTO-ENTMCNC: 32.9 G/DL (ref 32–36)
MCV RBC AUTO: 90.2 FL (ref 80–99)
MONOCYTES # BLD AUTO: 0.68 10*3/MM3 (ref 0–1)
MONOCYTES NFR BLD AUTO: 8.1 % (ref 0–12)
NEUTROPHILS # BLD AUTO: 5.26 10*3/MM3 (ref 1.5–8.3)
NEUTROPHILS NFR BLD AUTO: 62.5 % (ref 41–71)
PLATELET # BLD AUTO: 208 10*3/MM3 (ref 150–450)
PMV BLD AUTO: 11.4 FL (ref 6–12)
POTASSIUM BLD-SCNC: 3.9 MMOL/L (ref 3.5–5.5)
RBC # BLD AUTO: 3.67 10*6/MM3 (ref 3.89–5.14)
SODIUM BLD-SCNC: 132 MMOL/L (ref 132–146)
WBC NRBC COR # BLD: 8.4 10*3/MM3 (ref 3.5–10.8)

## 2018-11-14 PROCEDURE — 63710000001 INSULIN DETEMIR PER 5 UNITS: Performed by: INTERNAL MEDICINE

## 2018-11-14 PROCEDURE — 80048 BASIC METABOLIC PNL TOTAL CA: CPT | Performed by: INTERNAL MEDICINE

## 2018-11-14 PROCEDURE — 82962 GLUCOSE BLOOD TEST: CPT

## 2018-11-14 PROCEDURE — 83880 ASSAY OF NATRIURETIC PEPTIDE: CPT | Performed by: INTERNAL MEDICINE

## 2018-11-14 PROCEDURE — 99239 HOSP IP/OBS DSCHRG MGMT >30: CPT | Performed by: INTERNAL MEDICINE

## 2018-11-14 PROCEDURE — 94799 UNLISTED PULMONARY SVC/PX: CPT

## 2018-11-14 PROCEDURE — 85025 COMPLETE CBC W/AUTO DIFF WBC: CPT | Performed by: INTERNAL MEDICINE

## 2018-11-14 PROCEDURE — 25010000002 ENOXAPARIN PER 10 MG: Performed by: INTERNAL MEDICINE

## 2018-11-14 RX ORDER — ATORVASTATIN CALCIUM 80 MG/1
80 TABLET, FILM COATED ORAL DAILY
Qty: 30 TABLET | Refills: 3 | Status: SHIPPED | OUTPATIENT
Start: 2018-11-15 | End: 2020-01-01 | Stop reason: SDUPTHER

## 2018-11-14 RX ORDER — INSULIN ASPART 100 [IU]/ML
22 INJECTION, SUSPENSION SUBCUTANEOUS
COMMUNITY
End: 2020-01-01

## 2018-11-14 RX ORDER — NITROFURANTOIN MACROCRYSTALS 50 MG/1
50 CAPSULE ORAL NIGHTLY
COMMUNITY

## 2018-11-14 RX ORDER — HYDROCHLOROTHIAZIDE 25 MG/1
25 TABLET ORAL DAILY
COMMUNITY
End: 2018-11-14 | Stop reason: HOSPADM

## 2018-11-14 RX ORDER — CARVEDILOL 3.12 MG/1
3.12 TABLET ORAL EVERY 12 HOURS SCHEDULED
Qty: 60 TABLET | Refills: 3 | Status: SHIPPED | OUTPATIENT
Start: 2018-11-14 | End: 2020-01-01 | Stop reason: DRUGHIGH

## 2018-11-14 RX ORDER — GUAIFENESIN 200 MG/1
200 TABLET ORAL NIGHTLY
COMMUNITY
End: 2019-03-20

## 2018-11-14 RX ORDER — CLOPIDOGREL BISULFATE 75 MG/1
75 TABLET ORAL DAILY
Qty: 30 TABLET | Refills: 3 | Status: SHIPPED | OUTPATIENT
Start: 2018-11-15 | End: 2020-01-01 | Stop reason: SDUPTHER

## 2018-11-14 RX ORDER — SODIUM PHOSPHATE,MONO-DIBASIC 19G-7G/118
2 ENEMA (ML) RECTAL DAILY
COMMUNITY
End: 2019-03-20

## 2018-11-14 RX ORDER — DOXYCYCLINE 100 MG/1
100 CAPSULE ORAL EVERY 12 HOURS SCHEDULED
Qty: 8 CAPSULE | Refills: 0 | Status: SHIPPED | OUTPATIENT
Start: 2018-11-14 | End: 2018-11-18

## 2018-11-14 RX ADMIN — CARVEDILOL 3.12 MG: 3.12 TABLET, FILM COATED ORAL at 09:02

## 2018-11-14 RX ADMIN — Medication 650 MG: at 09:01

## 2018-11-14 RX ADMIN — INSULIN LISPRO 5 UNITS: 100 INJECTION, SOLUTION INTRAVENOUS; SUBCUTANEOUS at 12:01

## 2018-11-14 RX ADMIN — OXYCODONE HYDROCHLORIDE AND ACETAMINOPHEN 1000 MG: 500 TABLET ORAL at 09:02

## 2018-11-14 RX ADMIN — DOXYCYCLINE 100 MG: 100 CAPSULE ORAL at 09:01

## 2018-11-14 RX ADMIN — GABAPENTIN 400 MG: 400 CAPSULE ORAL at 12:01

## 2018-11-14 RX ADMIN — CHLORTHALIDONE 25 MG: 25 TABLET ORAL at 09:02

## 2018-11-14 RX ADMIN — ENOXAPARIN SODIUM 60 MG: 60 INJECTION SUBCUTANEOUS at 09:03

## 2018-11-14 RX ADMIN — GABAPENTIN 400 MG: 400 CAPSULE ORAL at 17:10

## 2018-11-14 RX ADMIN — VITAMIN D, TAB 1000IU (100/BT) 1000 UNITS: 25 TAB at 09:02

## 2018-11-14 RX ADMIN — ATORVASTATIN CALCIUM 80 MG: 40 TABLET, FILM COATED ORAL at 09:02

## 2018-11-14 RX ADMIN — MORPHINE SULFATE 15 MG: 15 TABLET, EXTENDED RELEASE ORAL at 09:02

## 2018-11-14 RX ADMIN — INSULIN DETEMIR 12 UNITS: 100 INJECTION, SOLUTION SUBCUTANEOUS at 09:00

## 2018-11-14 RX ADMIN — GABAPENTIN 400 MG: 400 CAPSULE ORAL at 06:51

## 2018-11-14 RX ADMIN — CARBAMAZEPINE 100 MG: 200 TABLET ORAL at 09:02

## 2018-11-14 RX ADMIN — CYANOCOBALAMIN TAB 1000 MCG 1000 MCG: 1000 TAB at 09:02

## 2018-11-14 RX ADMIN — MORPHINE SULFATE 30 MG: 30 TABLET, EXTENDED RELEASE ORAL at 12:01

## 2018-11-14 RX ADMIN — ASPIRIN 81 MG 81 MG: 81 TABLET ORAL at 09:02

## 2018-11-14 RX ADMIN — OXYBUTYNIN CHLORIDE 5 MG: 5 TABLET, EXTENDED RELEASE ORAL at 09:01

## 2018-11-14 RX ADMIN — AMLODIPINE BESYLATE 10 MG: 10 TABLET ORAL at 09:02

## 2018-11-14 RX ADMIN — BUDESONIDE 0.5 MG: 0.5 INHALANT RESPIRATORY (INHALATION) at 10:48

## 2018-11-14 RX ADMIN — Medication 1 CAPSULE: at 09:02

## 2018-11-14 RX ADMIN — PANTOPRAZOLE SODIUM 40 MG: 40 TABLET, DELAYED RELEASE ORAL at 06:51

## 2018-11-14 RX ADMIN — INSULIN LISPRO 4 UNITS: 100 INJECTION, SOLUTION INTRAVENOUS; SUBCUTANEOUS at 17:11

## 2018-11-14 RX ADMIN — Medication 1 TABLET: at 09:02

## 2018-11-14 RX ADMIN — MORPHINE SULFATE 15 MG: 15 TABLET, EXTENDED RELEASE ORAL at 17:11

## 2018-11-14 RX ADMIN — CLOPIDOGREL BISULFATE 75 MG: 75 TABLET ORAL at 09:01

## 2018-11-14 RX ADMIN — CYCLOSPORINE 1 DROP: 0.5 EMULSION OPHTHALMIC at 10:41

## 2018-11-14 RX ADMIN — Medication 3 ML: at 10:50

## 2018-11-14 RX ADMIN — INSULIN LISPRO 2 UNITS: 100 INJECTION, SOLUTION INTRAVENOUS; SUBCUTANEOUS at 09:01

## 2018-11-14 RX ADMIN — LOSARTAN POTASSIUM 100 MG: 50 TABLET ORAL at 09:01

## 2018-11-14 NOTE — DISCHARGE SUMMARY
[facility] Medicine Services  DISCHARGE SUMMARY    Patient Name: Fani Bird  : 1935  MRN: [id number]    Date of Admission: 2018  Date of Discharge:  18  Primary Care Physician: Emerson Alvarez MD    Consults     Date and Time Order Name Status Description    2018 0903 Inpatient Cardiology Consult      11/10/2018 0022 Inpatient Cardiology Consult Completed         Hospital Course     Presenting Problem:   Non-STEMI (non-ST elevated myocardial infarction) (CMS/HCC) [I21.4]  Non-STEMI (non-ST elevated myocardial infarction) (CMS/HCC) [I21.4]    Active Hospital Problems    Diagnosis Date Noted   • Hypertension [I10]    • GERD (gastroesophageal reflux disease) [K21.9]    • Dyslipidemia [E78.5]    • Diabetes mellitus (CMS/HCC) [E11.9]    • Coronary artery disease [I25.10]    • Cerebrovascular disease [I67.9]       Resolved Hospital Problems    Diagnosis Date Noted Date Resolved   • **NSTEMI (non-ST elevated myocardial infarction) (CMS/HCC) [I21.4] 11/10/2018 2018   • Chest pain [R07.9]  2018          Hospital Course:  Fani Bird is a 82 y.o. female with a past medical history significant for coronary artery disease status post stent in , hypertension, hyperlipidemia, diabetes mellitus who presents as a transfer from [facility] due to non-STEMI.  Patient reports a 3 day history of intermittent midsternal chest pressure.  She reports that her symptoms have been occurring about 2 hours after eating and she thought her chest pressure may be related to acid reflux.  Today, the patient had recurrence of midsternal chest pain 3 hours after breakfast, more severe.  She presented to an outside ED for further evaluation.      NSTEMI  - Cardiac catheterization revealed severe distal left, ostial LAD, and ostial circumflex stenosis.  - initial plan for coronary artery bypass grafting, but after discussing the case with her primary  cardiologist and obtaining a second opinion from CT Surgery, it was felt that Ms Bird would be a poor surgical candidate due to marginal functional capacity and her debilitated state from arthritis  - medical management with DAPT, statin, coreg  - follow up with Dr Whitley in his [place] office in one month    Probable Bronchitis  - new productive cough  - CXR no infiltrate; no leukocytosis or fever  - continue doxy x 5 days total     DMII  - a1c 8.3    Generalized weakness  - PT/OT  - rehab recommended, patient and family though prefer to return home at discharge. Case management making arrangements for HH and Home OT.     Dyslipidemia  - LDL 86  -      HTN     Chronic Pain  - also on Celebrex for her chronic pain - now on DAPT therapy too. No history of GI bleeding. After discussing with Ms Bird, I doubt she will be able to give up her NSAID due to her debilitating arthritic pain.     Discharge Follow Up Recommendations for labs/diagnostics:      Day of Discharge     HPI:   Denies chest pain or shortness of breath. Eager to go home    Review of Systems    Gen- No fevers, chills  CV- No chest pain, palpitations  Resp- No cough, dyspnea  GI- No N/V/D, abd pain      Otherwise ROS is negative except as mentioned in the HPI.    Vital Signs:   Temp:  [98.1 °F (36.7 °C)-98.7 °F (37.1 °C)] 98.1 °F (36.7 °C)  Heart Rate:  [68-83] 73  Resp:  [16-20] 16  BP: (131-183)/(67-83) 136/69     Physical Exam:  Constitutional -no acute distress, non toxic, up in chair  HEENT-NCAT, mucous membranes moist  CV-RRR no MRG  Resp-CTAB  Abd-soft, non-tender, non-distended, normo active bowel sounds  Ext-No lower extremity cyanosis, clubbing or edema bilaterally  Neuro-alert and oriented  Psych-normal affect   Skin- No rash on exposed UE or LE bilaterally    Pertinent  and/or Most Recent Results     Results from last 7 days   Lab Units  11/14/18   0539  11/11/18   0418  11/10/18   0546  11/10/18   0019   WBC 10*3/mm3  8.40   --    9.36  9.62   HEMOGLOBIN g/dL  10.9*   --   11.4*  12.8   HEMATOCRIT %  33.1*   --   34.8  39.4   PLATELETS 10*3/mm3  208   --   221  239   SODIUM mmol/L  132  137  136  136   POTASSIUM mmol/L  3.9  4.0  3.5  3.7   CHLORIDE mmol/L  95*  102  100  98*   CO2 mmol/L  27.0  31.0  27.0  30.0   BUN mg/dL  22  17  16  16   CREATININE mg/dL  0.67  0.77  0.62  0.75   GLUCOSE mg/dL  186*  238*  203*  270*   CALCIUM mg/dL  8.9  8.3*  8.9  9.6     Results from last 7 days   Lab Units  11/10/18   0019   BILIRUBIN mg/dL  0.2*   ALK PHOS U/L  74   ALT (SGPT) U/L  30   AST (SGOT) U/L  52*     Results from last 7 days   Lab Units  11/11/18   0418  11/10/18   0546   CHOLESTEROL mg/dL  137  152   TRIGLYCERIDES mg/dL  278*  379*   HDL CHOL mg/dL  36*  37*     Results from last 7 days   Lab Units  11/14/18   0539  11/10/18   1152  11/10/18   0546  11/10/18   0019   HEMOGLOBIN A1C %   --    --   8.30*   --    BNP pg/mL  165.0*   --    --    --    TROPONIN I ng/mL   --   11.376*  15.071*  11.854*     Brief Urine Lab Results     None          Microbiology Results Abnormal     None          Imaging Results (all)     Procedure Component Value Units Date/Time    XR Chest 1 View [[id number]] Collected:  11/13/18 1623     Updated:  11/13/18 1642    Narrative:       EXAMINATION: XR CHEST 1 VW- 11/13/2018     INDICATION: productive cough; I21.4-Non-ST elevation (NSTEMI) myocardial  infarction; Z74.09-Other reduced mobility     COMPARISON: 04/04/2017     FINDINGS: The cardiac silhouette is normal. There is no pulmonary  inflammatory process. There is no mass or effusion.           Impression:       No active disease.     D:  11/13/2018  E:  11/13/2018     This report was finalized on 11/13/2018 4:40 PM by Dr. Kendrick Stephen MD.             Results for orders placed during the hospital encounter of 11/09/18   Duplex Carotid Ultrasound CAR    Narrative · Proximal right internal carotid artery plaque without significant   stenosis.  · Right internal  carotid artery stenosis of 0-49%.  · Proximal left internal carotid artery plaque without significant   stenosis.  · Left internal carotid artery stenosis of 0-49%.  · Nominal antegrade bilateral vertebral artery flow demonstrated.          Results for orders placed during the hospital encounter of 11/09/18   Duplex Carotid Ultrasound CAR    Narrative · Proximal right internal carotid artery plaque without significant   stenosis.  · Right internal carotid artery stenosis of 0-49%.  · Proximal left internal carotid artery plaque without significant   stenosis.  · Left internal carotid artery stenosis of 0-49%.  · Nominal antegrade bilateral vertebral artery flow demonstrated.          Results for orders placed during the hospital encounter of 11/09/18   Adult Transthoracic Echo Complete W/ Cont if Necessary Per Protocol    Narrative · Left ventricular systolic function is normal. Estimated EF = 60%.  · The following left ventricular wall segments are hypokinetic: basal   inferolateral and mid inferolateral.  · Left ventricular diastolic dysfunction (grade I a) consistent with   impaired relaxation.            Discharge Details        Discharge Medications      New Medications      Instructions Start Date   carvedilol 3.125 MG tablet  Commonly known as:  COREG   3.125 mg, Oral, Every 12 Hours Scheduled      clopidogrel 75 MG tablet  Commonly known as:  PLAVIX   75 mg, Oral, Daily      doxycycline 100 MG capsule  Commonly known as:  MONODOX   100 mg, Oral, Every 12 Hours Scheduled         Changes to Medications      Instructions Start Date   atorvastatin 80 MG tablet  Commonly known as:  LIPITOR  What changed:    · medication strength  · how much to take  · when to take this   80 mg, Oral, Daily         Continue These Medications      Instructions Start Date   amLODIPine 10 MG tablet  Commonly known as:  NORVASC   10 mg, Oral, Daily      ascorbic acid 1000 MG tablet  Commonly known as:  VITAMIN C   1 tablet, Oral,  Daily      aspirin 81 MG tablet   81 mg, Oral, Daily      CELEBREX 200 MG capsule  Generic drug:  celecoxib   200 mg, Oral, Daily      chlorthalidone 25 MG tablet  Commonly known as:  HYGROTON   25 mg, Oral, Daily      Cranberry 1000 MG capsule   1 capsule, Oral, Daily      DETROL LA 4 MG 24 hr capsule  Generic drug:  tolterodine LA   1 capsule, Oral, Daily      diazePAM 2 MG tablet  Commonly known as:  VALIUM   2 mg, Oral, Nightly      docusate sodium 100 MG capsule  Commonly known as:  COLACE   100 mg, Oral, 2 Times Daily      estropipate 1.5 MG tablet  Commonly known as:  OGEN   1.5 mg, Oral, Daily      ferrous sulfate 325 (65 FE) MG tablet   325 mg, Oral, Daily With Breakfast & Dinner, 2 daily      fexofenadine 180 MG tablet  Commonly known as:  ALLEGRA   180 mg, Oral, 2 Times Daily      FISH OIL PO   1,000 mg, Oral, Daily      FLOVENT  MCG/ACT inhaler  Generic drug:  fluticasone   1 puff, Inhalation, 2 Times Daily      gabapentin 400 MG capsule  Commonly known as:  NEURONTIN   400 mg, Oral, 4 Times Daily      glucosamine-chondroitin 500-400 MG capsule capsule   2 capsules, Oral, Daily      guaiFENesin 200 MG tablet   200 mg, Oral, Nightly      ICAPS AREDS 2 PO   1 capsule, Oral, Daily      insulin aspart prot-insulin aspart (70-30) 100 UNIT/ML injection  Commonly known as:  novoLOG 70/30   22 Units, Subcutaneous, Daily Before Supper      insulin aspart prot-insulin aspart (70-30) 100 UNIT/ML injection  Commonly known as:  novoLOG 70/30   24 Units, Subcutaneous, Every Morning Before Breakfast      losartan 100 MG tablet  Commonly known as:  COZAAR   100 mg, Oral, Daily      mometasone 50 MCG/ACT nasal spray  Commonly known as:  NASONEX   2 sprays, Nasal, Every Evening      montelukast 10 MG tablet  Commonly known as:  SINGULAIR   10 mg, Oral, Nightly      Morphine 30 MG 12 hr tablet  Commonly known as:  MS CONTIN   30 mg, Oral, 2 Times Daily      Morphine 15 MG tablet  Commonly known as:  MSIR   15 mg,  Oral, 2 Times Daily PRN      nitrofurantoin 50 MG capsule  Commonly known as:  MACRODANTIN   50 mg, Oral, Nightly      nitroglycerin 0.4 MG SL tablet  Commonly known as:  NITROSTAT   0.4 mg, Sublingual, Every 5 Minutes PRN      PROTONIX 40 MG EC tablet  Generic drug:  pantoprazole   40 mg, Oral, 2 Times Daily      RESTASIS 0.05 % ophthalmic emulsion  Generic drug:  cycloSPORINE   1 drop, Ophthalmic, 2 Times Daily      TEGRETOL 200 MG tablet  Generic drug:  carBAMazepine   100 mg, Oral, 2 Times Daily      tiZANidine 4 MG tablet  Commonly known as:  ZANAFLEX   4 mg, Oral, Nightly      VENTOLIN  (90 Base) MCG/ACT inhaler  Generic drug:  albuterol   1 puff, Inhalation, 2 Times Daily, Before taking Flovent      albuterol (2.5 MG/3ML) 0.083% nebulizer solution  Commonly known as:  PROVENTIL   2.5 mg, Nebulization, Every 4 Hours PRN      VITAMIN B-12 PO   1 tablet, Oral, Weekly, Taken on Saturday      VITAMIN D PO   1,000 Units, Oral, Daily         Stop These Medications    hydrochlorothiazide 25 MG tablet  Commonly known as:  HYDRODIURIL              Discharge Disposition:  Home or Self Care    Discharge Diet:      Discharge Activity:           Special Instructions:      Code Status/Level of Support:  Code Status and Medical Interventions:   Ordered at: 11/10/18 1124     Level Of Support Discussed With:    Patient     Code Status:    CPR     Medical Interventions (Level of Support Prior to Arrest):    Full       Future Appointments   Date Time Provider Department Center   5/15/2019  3:15 PM Chinmay Whitley MD [facility] None       Additional Instructions for the Follow-ups that You Need to Schedule     Ambulatory Referral to Home Health   As directed      Face to Face Visit Date:  11/14/2018    Follow-up Provider for Plan of Care?:  I treated the patient in an acute care facility and will not continue treatment after discharge.    Follow-up Provider:  DELMY LEMON [[id number]]    Reason/Clinical Findings:  NSTEMI;  weakness    Describe mobility limitations that make leaving home difficult:  NSTEMI; weakness    Nursing/Therapeutic Services Requested:  Skilled Nursing Physical Therapy Occupational Therapy    Skilled nursing orders:  Medication education Cardiopulmonary assessments Neurovascular assessments Pain management    PT orders:  Other (eval & treat)    Occupational orders:  Other (eval & treat)         Discharge Follow-up with PCP   As directed       Currently Documented PCP:    Emerson Alvarez MD    PCP Phone Number:    [phone]     Follow Up Details:  follow up PCP one week         Discharge Follow-up with Specified Provider: follow up Cardiology Dr Whitley 6-8 weeks   As directed      To:  follow up Cardiology Dr Whitley 6-8 weeks           Correction - follow up with Dr Whitley in his [place] office in 4 weeks - notified [facility] to schedule this appointment    Time Spent on Discharge:  40 minutes    Electronically signed by Júnior Bishop MD, 11/14/18, 1:47 PM.

## 2018-11-14 NOTE — PROGRESS NOTES
Continued Stay Note   [place]     Patient Name: Fani Bird  MRN: [id number]  Today's Date: 11/14/2018    Admit Date: 11/9/2018    Discharge Plan     Row Name 11/14/18 1258       Plan    Plan  Return home with [facility] (resume)    Patient/Family in Agreement with Plan  yes    Plan Comments  Met with patient and daugther Yajaira at bedside. They plan for Ms. Bird to return home with Yajaira and to resume her home health skilled nursing and OT through [facility] with the addition of PT. Orders have been entered and faxed to F: [phone]. They no longer want her to go to SNF since she did not have to have surgery. Yajaira is patient's primary caregiver. Alfreda Perez RN [phone]    Update @ 2994: Spoke with Harika at [facility] and they have received the referral for resume [facility] and are aware patient is discharging today. Alfreda Perez RN [phone]    Final Discharge Disposition Code  06 - home with home health care        Discharge Codes    No documentation.       Expected Discharge Date and Time     Expected Discharge Date Expected Discharge Time    Nov 16, 2018             Alfreda Perez RN

## 2018-11-14 NOTE — PROGRESS NOTES
[facility] Medicine Services  PROGRESS NOTE    Patient Name: Fani Bird  : 1935  MRN: [id number]    Date of Admission: 2018  Length of Stay: 4  Primary Care Physician: Emerson Alvarez MD    Subjective   Subjective     CC:  DMII    HPI:  Still some cough, yellow sputum. Feels like strength is improving.      Review of Systems  Gen- No fevers, chills  CV- No chest pain, palpitations  Resp- cough as above  GI- No N/V/D, abd pain    Otherwise ROS is negative except as mentioned in the HPI.    Objective   Objective     Vital Signs:   Temp:  [98.4 °F (36.9 °C)-98.7 °F (37.1 °C)] 98.4 °F (36.9 °C)  Heart Rate:  [68-83] 74  Resp:  [16-20] 16  BP: (131-183)/(66-83) 159/69        Physical Exam:    Constitutional -non toxic, up in chair  HEENT-NCAT, mucous membranes moist  CV-RRR, no MRG  Resp-CTAB  Abd-soft, non-tender, non-distended, normo active bowel sounds  Ext-No lower extremity cyanosis, clubbing or edema bilaterally  Neuro-alert and oriented, speech clear, moves all extremities   Psych-normal affect   Skin- No rash on exposed UE or LE bilaterally      Results Reviewed:  I have personally reviewed current lab, radiology, and data and agree.    Results from last 7 days   Lab Units  18   0539  11/10/18   0546  11/10/18   0019   WBC 10*3/mm3  8.40  9.36  9.62   HEMOGLOBIN g/dL  10.9*  11.4*  12.8   HEMATOCRIT %  33.1*  34.8  39.4   PLATELETS 10*3/mm3  208  221  239     Results from last 7 days   Lab Units  18   0539  18   0418  11/10/18   1152  11/10/18   0546  11/10/18   0019   SODIUM mmol/L  132  137   --   136  136   POTASSIUM mmol/L  3.9  4.0   --   3.5  3.7   CHLORIDE mmol/L  95*  102   --   100  98*   CO2 mmol/L  27.0  31.0   --   27.0  30.0   BUN mg/dL  22  17   --   16  16   CREATININE mg/dL  0.67  0.77   --   0.62  0.75   GLUCOSE mg/dL  186*  238*   --   203*  270*   CALCIUM mg/dL  8.9  8.3*   --   8.9  9.6   ALT (SGPT) U/L   --    --    --    --    30   AST (SGOT) U/L   --    --    --    --   52*   TROPONIN I ng/mL   --    --   11.376*  15.071*  11.854*     Estimated Creatinine Clearance: 45 mL/min (by C-G formula based on SCr of 0.67 mg/dL).  BNP   Date Value Ref Range Status   11/14/2018 165.0 (H) 0.0 - 100.0 pg/mL Final     Comment:     Results may be falsely decreased if patient taking Biotin.       Microbiology Results Abnormal     None          Imaging Results (last 24 hours)     Procedure Component Value Units Date/Time    XR Chest 1 View [[id number]] Collected:  11/13/18 1623     Updated:  11/13/18 1642    Narrative:       EXAMINATION: XR CHEST 1 VW- 11/13/2018     INDICATION: productive cough; I21.4-Non-ST elevation (NSTEMI) myocardial  infarction; Z74.09-Other reduced mobility     COMPARISON: 04/04/2017     FINDINGS: The cardiac silhouette is normal. There is no pulmonary  inflammatory process. There is no mass or effusion.           Impression:       No active disease.     D:  11/13/2018  E:  11/13/2018     This report was finalized on 11/13/2018 4:40 PM by Dr. Kendrick Stephen MD.           Results for orders placed during the hospital encounter of 11/09/18   Adult Transthoracic Echo Complete W/ Cont if Necessary Per Protocol    Narrative · Left ventricular systolic function is normal. Estimated EF = 60%.  · The following left ventricular wall segments are hypokinetic: basal   inferolateral and mid inferolateral.  · Left ventricular diastolic dysfunction (grade I a) consistent with   impaired relaxation.          I have reviewed the medications.      amLODIPine 10 mg Oral Daily   aspirin 81 mg Oral Daily   atorvastatin 80 mg Oral Daily   budesonide 0.5 mg Nebulization BID - RT   carBAMazepine 100 mg Oral BID   carvedilol 3.125 mg Oral Q12H   chlorthalidone 25 mg Oral Daily   cholecalciferol 1,000 Units Oral Daily   clopidogrel 75 mg Oral Daily   cycloSPORINE 1 drop Both Eyes BID   diazePAM 2 mg Oral Nightly   doxycycline 100 mg Oral Q12H   enoxaparin 1  mg/kg Subcutaneous Q12H   ferrous sulfate 650 mg Oral Daily With Breakfast   fexofenadine 180 mg Oral Q12H   gabapentin 400 mg Oral 4x Daily   insulin detemir 12 Units Subcutaneous Q12H   insulin lispro 0-7 Units Subcutaneous 4x Daily With Meals & Nightly   insulin lispro 5 Units Subcutaneous TID With Meals   lactobacillus acidophilus 1 capsule Oral Daily   losartan 100 mg Oral Daily   montelukast 10 mg Oral Nightly   Morphine 15 mg Oral BID   Morphine 30 mg Oral BID   OCUVITE-LUTEIN 1 tablet Oral Daily   oxybutynin XL 5 mg Oral Daily   pantoprazole 40 mg Oral Q AM   pharmacy consult - MTM  Does not apply Daily   sodium chloride 3 mL Intravenous Q12H   tiZANidine 4 mg Oral Nightly   vitamin B-12 1,000 mcg Oral Daily   ascorbic acid 1,000 mg Oral Daily         Assessment/Plan   Assessment / Plan     Active Hospital Problems    Diagnosis   • **NSTEMI (non-ST elevated myocardial infarction) (CMS/[facility])   • Hypertension     uncontrolled. I will add chlorthalidone 25 to her current regimen     • GERD (gastroesophageal reflux disease)   • Dyslipidemia     May 2012 - Total 156, triglycerides 297, HDL 47, and LDL 54; on statin therapy      • Diabetes mellitus (CMS/[facility])     Hemoglobin A1c of 8.3.     • Coronary artery disease     post percutaneous coronary intervention and stenting with low normal left ventricular ejection fraction     • Cerebrovascular disease     a. TIA/CVA, September 2013.  b. Carotid CTA, September 2013:  60% to 70% left carotid bulb stenosis, severe right vertebral stenosis.     • Chest pain     c. On 01/10/2014:  NSTEMI with 3.0 x 15 Integrity BMS to ostium of circumflex per MGR.  Normal LVEF.  d. September 2015, admission to [facility] with ACS with negative enzymes and negative EKG.             Brief Hospital Course to date:  Fani Bird is a 82 y.o. female with h/o CAD and DMII presents after three days of chest pressure.       Assessment & Plan:    Probable Bronchitis  - new  productive cough  - CXR no infiltrate; no leukocytosis or fever  - continue doxy x 5 days    DMII  - a1c 8.3  - continue Increase basal insulin from 10 to 12 units BID, and add scheduled humalog 5 units TID meals.    NSTEMI  - [facility] shows multivessel disease  - poor surgical candidate due to marginal functional capacity and debilitated state from arthritis  - medical management with DAPT, statin    Generalized weakness  - PT/OT  - rehab recommended, patient and family pondering.    Dyslipidemia  - LDL 86  -     HTN    Chronic Pain  - HOLLIS reviewed, adjusted meds to match home dosing.    Anemia      DVT Prophylaxis:  lovenox    CODE STATUS:   Code Status and Medical Interventions:   Ordered at: 11/10/18 1124     Level Of Support Discussed With:    Patient     Code Status:    CPR     Medical Interventions (Level of Support Prior to Arrest):    Full       Disposition: I expect the patient to be discharged home vs rehab in 0-1 days.      Electronically signed by Júnior Bishop MD, 11/14/18, 10:40 AM.

## 2018-11-14 NOTE — PROGRESS NOTES
[facility] Medicine Services  PROGRESS NOTE    Patient Name: Fani Bird  : 1935  MRN: [id number]    Date of Admission: 2018  Length of Stay: 3  Primary Care Physician: Emerson Alvarez MD    Subjective   Subjective     CC:  DMII    HPI:  Denies chest pain. New productive cough, yellow sputum.  Feels weak in general      Review of Systems  Gen- No fevers, chills  CV- No chest pain, palpitations  Resp- cough as above  GI- No N/V/D, abd pain    Otherwise ROS is negative except as mentioned in the HPI.    Objective   Objective     Vital Signs:   Temp:  [98.5 °F (36.9 °C)-98.7 °F (37.1 °C)] 98.7 °F (37.1 °C)  Heart Rate:  [68-77] 77  Resp:  [16] 16  BP: (131-163)/(66-93) 142/74        Physical Exam:  Constitutional -no acute distress, up in chair  HEENT-NCAT, mucous membranes moist  CV-RRR, no MRG  Resp-rales right base, otherwise CTAB  Abd-soft, non-tender, non-distended, normo active bowel sounds  Ext-No lower extremity cyanosis, clubbing or edema bilaterally  Neuro-alert and oriented, speech clear, moves all extremities   Psych-normal affect   Skin- No rash on exposed UE or LE bilaterally      Results Reviewed:  I have personally reviewed current lab, radiology, and data and agree.    Results from last 7 days   Lab Units  11/10/18   0546  11/10/18   0019   WBC 10*3/mm3  9.36  9.62   HEMOGLOBIN g/dL  11.4*  12.8   HEMATOCRIT %  34.8  39.4   PLATELETS 10*3/mm3  221  239     Results from last 7 days   Lab Units  18   0418  11/10/18   1152  11/10/18   0546  11/10/18   0019   SODIUM mmol/L  137   --   136  136   POTASSIUM mmol/L  4.0   --   3.5  3.7   CHLORIDE mmol/L  102   --   100  98*   CO2 mmol/L  31.0   --   27.0  30.0   BUN mg/dL  17   --   16  16   CREATININE mg/dL  0.77   --   0.62  0.75   GLUCOSE mg/dL  238*   --   203*  270*   CALCIUM mg/dL  8.3*   --   8.9  9.6   ALT (SGPT) U/L   --    --    --   30   AST (SGOT) U/L   --    --    --   52*   TROPONIN I ng/mL    --   11.376*  15.071*  11.854*     Estimated Creatinine Clearance: 45 mL/min (by C-G formula based on SCr of 0.77 mg/dL).  No results found for: BNP    Microbiology Results Abnormal     None          Imaging Results (last 24 hours)     Procedure Component Value Units Date/Time    XR Chest 1 View [[id number]] Collected:  11/13/18 1623     Updated:  11/13/18 1642    Narrative:       EXAMINATION: XR CHEST 1 VW- 11/13/2018     INDICATION: productive cough; I21.4-Non-ST elevation (NSTEMI) myocardial  infarction; Z74.09-Other reduced mobility     COMPARISON: 04/04/2017     FINDINGS: The cardiac silhouette is normal. There is no pulmonary  inflammatory process. There is no mass or effusion.           Impression:       No active disease.     D:  11/13/2018  E:  11/13/2018     This report was finalized on 11/13/2018 4:40 PM by Dr. Kendrick Stephen MD.           Results for orders placed during the hospital encounter of 11/09/18   Adult Transthoracic Echo Complete W/ Cont if Necessary Per Protocol    Narrative · Left ventricular systolic function is normal. Estimated EF = 60%.  · The following left ventricular wall segments are hypokinetic: basal   inferolateral and mid inferolateral.  · Left ventricular diastolic dysfunction (grade I a) consistent with   impaired relaxation.          I have reviewed the medications.      amLODIPine 10 mg Oral Daily   aspirin 81 mg Oral Daily   atorvastatin 80 mg Oral Daily   budesonide 0.5 mg Nebulization BID - RT   carBAMazepine 100 mg Oral BID   carvedilol 3.125 mg Oral Q12H   chlorthalidone 25 mg Oral Daily   cholecalciferol 1,000 Units Oral Daily   clopidogrel 75 mg Oral Daily   cycloSPORINE 1 drop Both Eyes BID   diazePAM 2 mg Oral Nightly   doxycycline 100 mg Oral Q12H   enoxaparin 1 mg/kg Subcutaneous Q12H   ferrous sulfate 650 mg Oral Daily With Breakfast   fexofenadine 180 mg Oral Q12H   gabapentin 400 mg Oral 4x Daily   insulin detemir 12 Units Subcutaneous Q12H   insulin lispro 0-7  Units Subcutaneous 4x Daily With Meals & Nightly   insulin lispro 5 Units Subcutaneous TID With Meals   [START ON 11/14/2018] lactobacillus acidophilus 1 capsule Oral Daily   losartan 100 mg Oral Daily   montelukast 10 mg Oral Nightly   Morphine 15 mg Oral BID   Morphine 30 mg Oral BID   OCUVITE-LUTEIN 1 tablet Oral Daily   oxybutynin XL 5 mg Oral Daily   pantoprazole 40 mg Oral Q AM   pantoprazole 40 mg Intravenous Once   pharmacy consult - MTM  Does not apply Daily   sodium chloride 3 mL Intravenous Q12H   tiZANidine 4 mg Oral Nightly   vitamin B-12 1,000 mcg Oral Daily   ascorbic acid 1,000 mg Oral Daily         Assessment/Plan   Assessment / Plan     Active Hospital Problems    Diagnosis   • **NSTEMI (non-ST elevated myocardial infarction) (CMS/[facility])   • Hypertension     uncontrolled. I will add chlorthalidone 25 to her current regimen     • GERD (gastroesophageal reflux disease)   • Dyslipidemia     May 2012 - Total 156, triglycerides 297, HDL 47, and LDL 54; on statin therapy      • Diabetes mellitus (CMS/[facility])     Hemoglobin A1c of 8.3.     • Coronary artery disease     post percutaneous coronary intervention and stenting with low normal left ventricular ejection fraction     • Cerebrovascular disease     a. TIA/CVA, September 2013.  b. Carotid CTA, September 2013:  60% to 70% left carotid bulb stenosis, severe right vertebral stenosis.     • Chest pain     c. On 01/10/2014:  NSTEMI with 3.0 x 15 Integrity BMS to ostium of circumflex per MGR.  Normal LVEF.  d. September 2015, admission to [facility] with ACS with negative enzymes and negative EKG.             Brief Hospital Course to date:  Fani Bird is a 82 y.o. female with h/o CAD and DMII presents after three days of chest pressure.       Assessment & Plan:    Probable Bronchitis  - new productive cough  - CXR no infiltrate  - empiric doxycycline  - cbc am    DMII  - a1c 8.3  - continue Increase basal insulin from 10 to 12 units BID,  and add scheduled humalog 5 units TID meals.    NSTEMI  - [facility] shows multivessel disease  - poor surgical candidate due to marginal functional capacity and debilitated state from arthritis  - medical management with DAPT, statin    Generalized weakness  - PT/OT    Dyslipidemia  - LDL 86  -     HTN    Chronic Pain  - HOLLIS reviewed, adjusted meds to match home dosing.      DVT Prophylaxis:  lovenox    CODE STATUS:   Code Status and Medical Interventions:   Ordered at: 11/10/18 1124     Level Of Support Discussed With:    Patient     Code Status:    CPR     Medical Interventions (Level of Support Prior to Arrest):    Full       Disposition: I expect the patient to be discharged home in ? days.      Electronically signed by Júnior Bishop MD, 11/13/18, 9:09 PM.

## 2018-11-14 NOTE — DISCHARGE PLACEMENT REQUEST
"Alfreda Perez RN  Case Management  P: [phone]    Referral for short-term rehab    Fani Bird (82 y.o. Female)     Date of Birth Social Security Number Address Home Phone MRN    [id number]  [id number]   [facility] [place] [address] [phone] [id number]    Islam Marital Status          Other        Admission Date Admission Type Admitting Provider Attending Provider Department, Room/Bed    18 Urgent Júnior Bishop MD Sloan, Walker E, MD [facility] 6A, N615/1    Discharge Date Discharge Disposition Discharge Destination                       Attending Provider:  Júnior Bishop MD    Allergies:  Isosorbide, Augmentin [Amoxicillin-pot Clavulanate]    Isolation:  None   Infection:  None   Code Status:  CPR    Ht:  154.9 cm (60.98\")   Wt:  57.6 kg (127 lb)    Admission Cmt:  None   Principal Problem:  NSTEMI (non-ST elevated myocardial infarction) (CMS/[facility]) [I21.4]                 Active Insurance as of 2018     Primary Coverage     Payor Plan Insurance Group Employer/Plan Group    HUMANA MEDICARE REPLACEMENT HUMANA MEDICARE REPL [id number]     Payor Plan Address Payor Plan Phone Number Payor Plan Fax Number Effective Dates    [address] [address] [phone]  2018 - None Entered    [facility] [id number]       Subscriber Name Subscriber Birth Date Member ID       FANI BIRD 1935 [id number]                 Emergency Contacts      (Rel.) Home Phone Work Phone Mobile Phone    Yajaira Marie (Daughter) [phone] -- --               History & Physical      Katarzyna Garner DO at 11/10/2018 12:18 AM              Psychiatric Medicine Services  HISTORY AND PHYSICAL    Patient Name: Fani Bird  : 1935  MRN: [id number]  Primary Care Physician: Emerson Alvarez MD  Date of admission: 2018      Subjective   Subjective     Chief Complaint:  Chest pain    HPI:  Fani Bird is a 82 y.o. female with a " past medical history significant for coronary artery disease status post stent in 2014, hypertension, hyperlipidemia, diabetes mellitus who presents as a transfer from [facility] due to non-STEMI.  Patient reports a 3 day history of intermittent midsternal chest pressure.  She reports that her symptoms have been occurring about 2 hours after eating and she thought her chest pressure may be related to acid reflux.  Today, the patient had recurrence of midsternal chest pain 3 hours after breakfast, more severe.  She presented to an outside ED for further evaluation.  She reports complete resolution of pain after receiving Nitropaste.  She denies associated shortness of breath, diaphoresis, nausea, vomiting, palpitations, presyncope.    Review of Systems     Otherwise 10-system ROS reviewed and is negative except as mentioned in the HPI.    Personal History     Past Medical History:   Diagnosis Date   • Cataract    • Cerebrovascular disease    • Chest pain    • Coronary artery disease     no recent ischemic evaluation   • Diabetes mellitus (CMS/[facility])     Hemoglobin A1c of 8.3.   • Dyslipidemia     May 2012 - Total 156, triglycerides 297, HDL 47, and LDL 54.   • Dyspnea    • GERD (gastroesophageal reflux disease)    • Hypertension    • Pneumonia    • Swelling of lower extremity     improved with stockings   • Vitamin D deficiency        Past Surgical History:   Procedure Laterality Date   • BLADDER REPAIR     • BREAST BIOPSY     • BREAST CYST ASPIRATION     • CARDIAC CATHETERIZATION     • CARPAL TUNNEL RELEASE Bilateral    • CATARACT EXTRACTION     • CHOLECYSTECTOMY     • CORONARY STENT PLACEMENT     • HYSTERECTOMY     • SPINAL FUSION         Family History: family history includes Diabetes in her brother and father; Heart attack in her father and maternal grandmother; Heart disease in her father and mother; Hypertension in her brother, father, and mother; Liver cancer in her brother; No Known Problems in her  paternal grandfather and paternal grandmother; Stroke in her maternal grandfather and maternal grandmother.     Social History:  reports that she has never smoked. She has never used smokeless tobacco. She reports that she does not drink alcohol or use drugs.  Social History     Social History Narrative    Caffeine: 3 servings per day    Patient lives with her urmila       Medications:  Available home medication information reviewed     Allergies   Allergen Reactions   • Isosorbide    • Augmentin [Amoxicillin-Pot Clavulanate] Diarrhea and Rash       Objective   Objective     Vital Signs:   Heart Rate:  [94-95] 94  Resp:  [14] 14  BP: (149-173)/(73-81) 149/73        Physical Exam   Constitutional: No acute distress, awake, alert  Eyes: PERRLA, sclerae anicteric, no conjunctival injection  HENT: NCAT, mucous membranes moist  Neck: Supple, no thyromegaly, no lymphadenopathy, trachea midline  Respiratory: Clear to auscultation bilaterally, nonlabored respirations   Cardiovascular: RRR, no murmurs, rubs, or gallops, palpable pedal pulses bilaterally  Gastrointestinal: Positive bowel sounds, soft, nontender, nondistended  Musculoskeletal: No bilateral ankle edema, no clubbing or cyanosis to extremities  Psychiatric: Appropriate affect, cooperative  Neurologic: Oriented x 3, strength symmetric in all extremities, Cranial Nerves grossly intact to confrontation, speech clear  Skin: No rashes      Results Reviewed:  I have personally reviewed current lab, radiology, and data and agree.  Laboratory data from outside hospital  WBC 11.3, hemoglobin 13.6, hematocrit 40.4, platelet count 236  INR 0.9  UA: 1+ protein, 2+ glucose  Sodium 133, potassium 4.0, chloride 95, bicarbonate 28, BUN 17, creatinine 0.54, glucose 237, calcium 9.3, total protein 8.4, albumin 3.9, total bilirubin 0.2, AST 35, ALT 30, alkaline phosphatase 70  Initial troponin 1.15, second troponin 3.69, CK-MB 7.5 elevated,  elevated, myoglobin 220  elevated    EKG with left axis deviation, LVH            Invalid input(s):  ALKPHOS, TROPONININT  CrCl cannot be calculated (Patient's most recent lab result is older than the maximum 30 days allowed.).  Brief Urine Lab Results     None        No results found for: BNP  Imaging Results (last 24 hours)     ** No results found for the last 24 hours. **             Assessment/Plan   Assessment / Plan     Active Hospital Problems    Diagnosis   • **NSTEMI (non-ST elevated myocardial infarction) (CMS/[facility])   • Hypertension     uncontrolled. I will add chlorthalidone 25 to her current regimen     • GERD (gastroesophageal reflux disease)   • Dyslipidemia     May 2012 - Total 156, triglycerides 297, HDL 47, and LDL 54; on statin therapy      • Diabetes mellitus (CMS/[facility])     Hemoglobin A1c of 8.3.     • Coronary artery disease     post percutaneous coronary intervention and stenting with low normal left ventricular ejection fraction     • Cerebrovascular disease     a. TIA/CVA, September 2013.  b. Carotid CTA, September 2013:  60% to 70% left carotid bulb stenosis, severe right vertebral stenosis.     • Chest pain     c. On 01/10/2014:  NSTEMI with 3.0 x 15 Integrity BMS to ostium of circumflex per MGR.  Normal LVEF.  d. September 2015, admission to [facility] with ACS with negative enzymes and negative EKG.        This is an 82-year-old female patient with a past medical history significant for coronary artery disease status post stent, diabetes mellitus, hypertension who presents as a transfer from an outside facility with chest pain and found to have a non-STEMI.    Assessment & Plan:  Non-STEMI  --Patient given Lovenox (at 1955) and Nitropaste at outside hospital with resolution of chest pain.  Defer to cardiology regarding continuing Lovenox  --Monitor on telemetry  --Nothing by mouth after midnight, cardiology consult  --Atorvastatin, aspirin, Coreg, losartan  --Fasting lipid panel, hemoglobin A1c in  a.m.  --Continue to trend troponin, trending upward at OSH  --EKG in a.m.  --Defer to cardiology regarding ordering echocardiogram  --Gentle IVFs    Diabetes mellitus  --Reduced dose of long-acting insulin  --Sliding scale insulin with Accu-Cheks    Hypertension/hyperlipidemia  --Atorvastatin, Coreg, losartan chlorthalidone    GERD  --Continue with home Protonix    DVT prophylaxis:  Lovenox SCDs  CODE STATUS:    Code Status and Medical Interventions:   Ordered at: 11/10/18 0022     Level Of Support Discussed With:    Patient     Code Status:    CPR     Medical Interventions (Level of Support Prior to Arrest):    Full       Admission Status:  I believe this patient meets INPATIENT status due to the need for care which can only be reasonably provided in an hospital setting such as aggressive/expedited ancillary services and/or consultation services, the necessity for IV medications, close physician monitoring and/or the possible need for procedures.  In such, I feel patient’s risk for adverse outcomes and need for care warrant INPATIENT evaluation and predict the patient’s care encounter to likely last beyond 2 midnights.      Electronically signed by Katarzyna Garner DO, 11/10/18, 12:18 AM.          Electronically signed by Katarzyna Garner DO at 11/10/2018 12:42 AM       Hospital Medications (active)       Dose Frequency Start End    acetaminophen (TYLENOL) tablet 650 mg 650 mg Every 4 Hours PRN 11/10/2018     Sig - Route: Take 2 tablets by mouth Every 4 (Four) Hours As Needed for Mild Pain . - Oral    albuterol (PROVENTIL) nebulizer solution 0.083% 2.5 mg/3mL 2.5 mg Every 4 Hours PRN 11/10/2018     Sig - Route: Take 2.5 mg by nebulization Every 4 (Four) Hours As Needed for Shortness of Air. - Nebulization    amLODIPine (NORVASC) tablet 10 mg 10 mg Daily 11/10/2018     Sig - Route: Take 1 tablet by mouth Daily. - Oral    aspirin chewable tablet 81 mg 81 mg Daily 11/10/2018     Sig - Route: Chew 1 tablet Daily. - Oral     atorvastatin (LIPITOR) tablet 80 mg 80 mg Daily 11/13/2018     Sig - Route: Take 2 tablets by mouth Daily. - Oral    budesonide (PULMICORT) nebulizer solution 0.5 mg 0.5 mg 2 Times Daily - RT 11/10/2018     Sig - Route: Take 2 mL by nebulization 2 (Two) Times a Day. - Nebulization    carBAMazepine (TEGretol) tablet 100 mg 100 mg 2 Times Daily 11/10/2018     Sig - Route: Take 0.5 tablets by mouth 2 (Two) Times a Day. - Oral    carvedilol (COREG) tablet 3.125 mg 3.125 mg Every 12 Hours Scheduled 11/10/2018     Sig - Route: Take 1 tablet by mouth Every 12 (Twelve) Hours. - Oral    chlorthalidone (HYGROTON) tablet 25 mg 25 mg Daily 11/10/2018     Sig - Route: Take 1 tablet by mouth Daily. - Oral    cholecalciferol (VITAMIN D3) tablet 1,000 Units 1,000 Units Daily 11/10/2018     Sig - Route: Take 1 tablet by mouth Daily. - Oral    clopidogrel (PLAVIX) tablet 75 mg 75 mg Daily 11/12/2018     Sig - Route: Take 1 tablet by mouth Daily. - Oral    cycloSPORINE (RESTASIS) 0.05 % ophthalmic emulsion 1 drop 1 drop 2 Times Daily 11/10/2018     Sig - Route: Administer 1 drop to both eyes 2 (Two) Times a Day. - Both Eyes    dextrose (D50W) 25 g/ 50mL Intravenous Solution 25 g 25 g Every 15 Minutes PRN 11/10/2018     Sig - Route: Infuse 50 mL into a venous catheter Every 15 (Fifteen) Minutes As Needed for Low Blood Sugar (Blood Sugar Less Than 70). - Intravenous    dextrose (GLUTOSE) oral gel 15 g 15 g Every 15 Minutes PRN 11/10/2018     Sig - Route: Take 15 g by mouth Every 15 (Fifteen) Minutes As Needed for Low Blood Sugar (Blood sugar less than 70). - Oral    diazePAM (VALIUM) tablet 2 mg 2 mg Nightly 11/10/2018     Sig - Route: Take 1 tablet by mouth Every Night. - Oral    doxycycline (MONODOX) capsule 100 mg 100 mg Every 12 Hours Scheduled 11/13/2018 11/18/2018    Sig - Route: Take 1 capsule by mouth Every 12 (Twelve) Hours. - Oral    enoxaparin (LOVENOX) syringe 60 mg 1 mg/kg × 57.6 kg Every 12 Hours 11/10/2018     Sig -  Route: Inject 0.6 mL under the skin into the appropriate area as directed Every 12 (Twelve) Hours. - Subcutaneous    ferrous sulfate tablet 650 mg 650 mg Daily With Breakfast 11/11/2018     Sig - Route: Take 2 tablets by mouth Daily With Breakfast. - Oral    fexofenadine (ALLEGRA) tablet 180 mg 180 mg Every 12 Hours 11/10/2018     Sig - Route: Take 1 tablet by mouth Every 12 (Twelve) Hours. - Oral    Notes to Pharmacy: Takes patient supply allegra    gabapentin (NEURONTIN) capsule 400 mg 400 mg 4 Times Daily 11/10/2018     Sig - Route: Take 1 capsule by mouth 4 (Four) Times a Day. - Oral    glucagon (human recombinant) (GLUCAGEN DIAGNOSTIC) injection 1 mg 1 mg As Needed 11/10/2018     Sig - Route: Inject 1 mg under the skin into the appropriate area as directed As Needed (Blood Glucose Less Than 70). - Subcutaneous    hydrALAZINE (APRESOLINE) tablet 25 mg 25 mg 3 Times Daily PRN 11/11/2018     Sig - Route: Take 1 tablet by mouth 3 (Three) Times a Day As Needed (SBP > 160mmHg). - Oral    insulin detemir (LEVEMIR) injection 12 Units 12 Units Every 12 Hours Scheduled 11/12/2018     Sig - Route: Inject 12 Units under the skin into the appropriate area as directed Every 12 (Twelve) Hours. - Subcutaneous    insulin lispro (humaLOG) injection 0-7 Units 0-7 Units 4 Times Daily With Meals & Nightly 11/10/2018     Sig - Route: Inject 0-7 Units under the skin into the appropriate area as directed 4 (Four) Times a Day With Meals & at Bedtime. - Subcutaneous    insulin lispro (humaLOG) injection 5 Units 5 Units 3 Times Daily With Meals 11/12/2018     Sig - Route: Inject 5 Units under the skin into the appropriate area as directed 3 (Three) Times a Day With Meals. - Subcutaneous    lactobacillus acidophilus (RISAQUAD) capsule 1 capsule 1 capsule Daily 11/14/2018     Sig - Route: Take 1 capsule by mouth Daily. - Oral    losartan (COZAAR) tablet 100 mg 100 mg Daily 11/10/2018     Sig - Route: Take 2 tablets by mouth Daily. - Oral  "   montelukast (SINGULAIR) tablet 10 mg 10 mg Nightly 11/10/2018     Sig - Route: Take 1 tablet by mouth Every Night. - Oral    Morphine (MS CONTIN) 12 hr tablet 15 mg 15 mg 2 Times Daily 11/13/2018 11/23/2018    Sig - Route: Take 1 tablet by mouth 2 (Two) Times a Day. - Oral    Morphine (MS CONTIN) 12 hr tablet 30 mg 30 mg 2 Times Daily 11/13/2018     Sig - Route: Take 1 tablet by mouth 2 (Two) Times a Day. - Oral    nitroglycerin (NITROSTAT) SL tablet 0.4 mg 0.4 mg Every 5 Minutes PRN 11/10/2018     Sig - Route: Place 1 tablet under the tongue Every 5 (Five) Minutes As Needed for Chest Pain. - Sublingual    nitroglycerin 50 mg/250 mL (0.2 mg/mL) infusion 10-50 mcg/min Titrated 11/10/2018     Sig - Route: Infuse 10-50 mcg/min into a venous catheter Dose Adjusted By Provider As Needed. - Intravenous    OCUVITE-LUTEIN (OCUVITE) tablet 1 tablet 1 tablet Daily 11/10/2018     Sig - Route: Take 1 tablet by mouth Daily. - Oral    ondansetron (ZOFRAN) injection 4 mg 4 mg Every 6 Hours PRN 11/10/2018     Sig - Route: Infuse 2 mL into a venous catheter Every 6 (Six) Hours As Needed for Nausea or Vomiting. - Intravenous    Linked Group 1:  \"Or\" Linked Group Details        ondansetron (ZOFRAN) tablet 4 mg 4 mg Every 6 Hours PRN 11/10/2018     Sig - Route: Take 1 tablet by mouth Every 6 (Six) Hours As Needed for Nausea or Vomiting. - Oral    Linked Group 1:  \"Or\" Linked Group Details        oxybutynin XL (DITROPAN-XL) 24 hr tablet 5 mg 5 mg Daily 11/10/2018     Sig - Route: Take 1 tablet by mouth Daily. - Oral    pantoprazole (PROTONIX) EC tablet 40 mg 40 mg Every Early Morning 11/10/2018     Sig - Route: Take 1 tablet by mouth Every Morning. - Oral    pantoprazole (PROTONIX) injection 40 mg 40 mg Once 11/13/2018 11/13/2018    Sig - Route: Infuse 10 mL into a venous catheter 1 (One) Time. - Intravenous    Pharmacy Consult - MTM  Daily 11/13/2018     Sig - Route: Daily. - Does not apply    sodium chloride 0.9 % flush 3 mL 3 mL " Every 12 Hours Scheduled 11/10/2018     Sig - Route: Infuse 3 mL into a venous catheter Every 12 (Twelve) Hours. - Intravenous    sodium chloride 0.9 % flush 3-10 mL 3-10 mL As Needed 11/10/2018     Sig - Route: Infuse 3-10 mL into a venous catheter As Needed for Line Care. - Intravenous    sodium chloride 0.9 % infusion 1-3 mL/kg/hr × 57.6 kg Continuous 11/10/2018     Sig - Route: Infuse 57.6-172.8 mL/hr into a venous catheter Continuous. - Intravenous    tiZANidine (ZANAFLEX) tablet 4 mg 4 mg Nightly 11/10/2018     Sig - Route: Take 1 tablet by mouth Every Night. - Oral    vitamin B-12 (CYANOCOBALAMIN) tablet 1,000 mcg 1,000 mcg Daily 11/10/2018     Sig - Route: Take 1 tablet by mouth Daily. - Oral    vitamin C (ASCORBIC ACID) tablet 1,000 mg 1,000 mg Daily 11/10/2018     Sig - Route: Take 2 tablets by mouth Daily. - Oral    Morphine (MS CONTIN) 12 hr tablet 30 mg (Discontinued) 30 mg Every 12 Hours Scheduled 11/10/2018 11/13/2018    Sig - Route: Take 1 tablet by mouth Every 12 (Twelve) Hours. - Oral          Lab Results (most recent)     Procedure Component Value Units Date/Time    BNP [[id number]]  (Abnormal) Collected:  11/14/18 0539    Specimen:  Blood Updated:  11/14/18 0807     .0 pg/mL      Comment: Results may be falsely decreased if patient taking Biotin.       POC Glucose Once [[id number]]  (Abnormal) Collected:  11/14/18 0724    Specimen:  Blood Updated:  11/14/18 0732     Glucose 196 mg/dL     Basic Metabolic Panel [[id number]]  (Abnormal) Collected:  11/14/18 0539    Specimen:  Blood Updated:  11/14/18 0700     Glucose 186 mg/dL      BUN 22 mg/dL      Creatinine 0.67 mg/dL      Sodium 132 mmol/L      Potassium 3.9 mmol/L      Chloride 95 mmol/L      CO2 27.0 mmol/L      Calcium 8.9 mg/dL      eGFR Non African Amer 84 mL/min/1.73      BUN/Creatinine Ratio 32.8     Anion Gap 10.0 mmol/L     Narrative:       National Kidney Foundation Guidelines    Stage     Description        GFR  1         Normal  or High     90+  2         Mild decrease      60-89  3         Moderate decrease  30-59  4         Severe decrease    15-29  5         Kidney failure     <15    The MDRD GFR formula is only valid for adults with stable renal function between ages 18 and 70.    CBC & Differential [[id number]] Collected:  11/14/18 0539    Specimen:  Blood Updated:  11/14/18 0622    Narrative:       The following orders were created for panel order CBC & Differential.  Procedure                               Abnormality         Status                     ---------                               -----------         ------                     CBC Auto Differential[[id number]]        Abnormal            Final result                 Please view results for these tests on the individual orders.    CBC Auto Differential [[id number]]  (Abnormal) Collected:  11/14/18 0539    Specimen:  Blood Updated:  11/14/18 0622     WBC 8.40 10*3/mm3      RBC 3.67 10*6/mm3      Hemoglobin 10.9 g/dL      Hematocrit 33.1 %      MCV 90.2 fL      MCH 29.7 pg      MCHC 32.9 g/dL      RDW 12.7 %      RDW-SD 41.7 fl      MPV 11.4 fL      Platelets 208 10*3/mm3      Neutrophil % 62.5 %      Lymphocyte % 24.2 %      Monocyte % 8.1 %      Eosinophil % 4.8 %      Basophil % 0.4 %      Immature Grans % 0.5 %      Neutrophils, Absolute 5.26 10*3/mm3      Lymphocytes, Absolute 2.03 10*3/mm3      Monocytes, Absolute 0.68 10*3/mm3      Eosinophils, Absolute 0.40 10*3/mm3      Basophils, Absolute 0.03 10*3/mm3      Immature Grans, Absolute 0.04 10*3/mm3     POC Glucose Once [[id number]]  (Abnormal) Collected:  11/13/18 2003    Specimen:  Blood Updated:  11/13/18 2007     Glucose 322 mg/dL     POC Glucose Once [[id number]]  (Abnormal) Collected:  11/13/18 1623    Specimen:  Blood Updated:  11/13/18 1624     Glucose 303 mg/dL     POC Glucose Once [[id number]]  (Abnormal) Collected:  11/13/18 1121    Specimen:  Blood Updated:  11/13/18 1122     Glucose 266 mg/dL     POC Glucose Once  [[id number]]  (Abnormal) Collected:  11/13/18 0719    Specimen:  Blood Updated:  11/13/18 0720     Glucose 184 mg/dL     P2Y12 Platelet Inhibition [[id number]] Collected:  11/13/18 0513    Specimen:  Blood Updated:  11/13/18 0637     P2Y12 Reactivity Unit 150 PRU     Narrative:       P2Y12 Interpretation:  Pre-Drug normal reference range is 194-418 PRU.  Test results are reported in P2Y12 reaction units (PRU). This measures the extent of platelet aggregation in the presence of P2Y12 inhibitor drugs, such as clopidogrel (Plavix), prasugrel (Effient), ticagrelor (Brilinta), ticlopidine (Ticlid).  P2Y12 values <194 PRU (low end of reference range) are specific evidence of a P2Y12 inhibitor effect.  Patients who have been treated with Glycoprotein IIb/IIIa inhibitors should not be tested until platelet function has recovered. This time period is approximately 14 days after discontinuation of abciximab (ReoPro) and up to 48 hours after discontinuation of eptifibatide (Integrilin) and tirofiban (Aggrastat).   The P2Y12 test results should be interpreted in conjunction with other clinical and lab data available to the clinician.    POC Glucose Once [[id number]]  (Abnormal) Collected:  11/12/18 1921    Specimen:  Blood Updated:  11/12/18 1943     Glucose 357 mg/dL     POC Glucose Once [[id number]]  (Abnormal) Collected:  11/12/18 1629    Specimen:  Blood Updated:  11/12/18 1632     Glucose 358 mg/dL     POC Glucose Once [[id number]]  (Abnormal) Collected:  11/12/18 1110    Specimen:  Blood Updated:  11/12/18 1112     Glucose 335 mg/dL     POC Glucose Once [[id number]]  (Abnormal) Collected:  11/12/18 0722    Specimen:  Blood Updated:  11/12/18 0726     Glucose 254 mg/dL     POC Glucose Once [[id number]]  (Abnormal) Collected:  11/11/18 2026    Specimen:  Blood Updated:  11/11/18 2028     Glucose 322 mg/dL     POC Glucose Once [[id number]]  (Abnormal) Collected:  11/11/18 1621    Specimen:  Blood Updated:  11/11/18 1623     Glucose  253 mg/dL     POC Glucose Once [[id number]]  (Abnormal) Collected:  11/11/18 1106    Specimen:  Blood Updated:  11/11/18 1108     Glucose 272 mg/dL     P2Y12 Platelet Inhibition [[id number]] Collected:  11/11/18 0418    Specimen:  Blood Updated:  11/11/18 0739     P2Y12 Reactivity Unit 154 PRU     Narrative:       P2Y12 Interpretation:  Pre-Drug normal reference range is 194-418 PRU.  Test results are reported in P2Y12 reaction units (PRU). This measures the extent of platelet aggregation in the presence of P2Y12 inhibitor drugs, such as clopidogrel (Plavix), prasugrel (Effient), ticagrelor (Brilinta), ticlopidine (Ticlid).  P2Y12 values <194 PRU (low end of reference range) are specific evidence of a P2Y12 inhibitor effect.  Patients who have been treated with Glycoprotein IIb/IIIa inhibitors should not be tested until platelet function has recovered. This time period is approximately 14 days after discontinuation of abciximab (ReoPro) and up to 48 hours after discontinuation of eptifibatide (Integrilin) and tirofiban (Aggrastat).   The P2Y12 test results should be interpreted in conjunction with other clinical and lab data available to the clinician.    POC Glucose Once [[id number]]  (Abnormal) Collected:  11/11/18 0637    Specimen:  Blood Updated:  11/11/18 0639     Glucose 222 mg/dL     Basic Metabolic Panel [[id number]]  (Abnormal) Collected:  11/11/18 0418    Specimen:  Blood Updated:  11/11/18 0618     Glucose 238 mg/dL      BUN 17 mg/dL      Creatinine 0.77 mg/dL      Sodium 137 mmol/L      Potassium 4.0 mmol/L      Chloride 102 mmol/L      CO2 31.0 mmol/L      Calcium 8.3 mg/dL      eGFR Non African Amer 72 mL/min/1.73      BUN/Creatinine Ratio 22.1     Anion Gap 4.0 mmol/L     Narrative:       National Kidney Foundation Guidelines    Stage     Description        GFR  1         Normal or High     90+  2         Mild decrease      60-89  3         Moderate decrease  30-59  4         Severe decrease    15-29  5          Kidney failure     <15    The MDRD GFR formula is only valid for adults with stable renal function between ages 18 and 70.    Lipid Panel [[id number]]  (Abnormal) Collected:  11/11/18 0418    Specimen:  Blood Updated:  11/11/18 0618     Total Cholesterol 137 mg/dL      Triglycerides 278 mg/dL      HDL Cholesterol 36 mg/dL      LDL Cholesterol  78 mg/dL     Narrative:       Cholesterol Reference Ranges:   Desirable       < 200 mg/dL   Borderline    200-239 mg/dL   High Risk       > 239 mg/dL    Triglyceride Reference Ranges:   Normal          < 150 mg/dL   Borderline    150-199 mg/dL   High          200-499 mg/dL   Very High       > 499 mg/dL    HDL Reference Ranges:   Low              < 40 mg/dL   High             > 59 mg/dL    LDL Reference Ranges:   Optimal         < 100 mg/dL   Near Optimal  100-129 mg/dL   Borderline    130-159 mg/dL   High          160-189 mg/dL   Very High       > 189 mg/dL    POC Glucose Once [[id number]]  (Abnormal) Collected:  11/11/18 0335    Specimen:  Blood Updated:  11/11/18 0337     Glucose 263 mg/dL     POC Glucose Once [[id number]]  (Abnormal) Collected:  11/11/18 0011    Specimen:  Blood Updated:  11/11/18 0013     Glucose 261 mg/dL     POC Glucose Once [[id number]]  (Abnormal) Collected:  11/10/18 2030    Specimen:  Blood Updated:  11/10/18 2031     Glucose 254 mg/dL     POC Glucose Once [[id number]]  (Abnormal) Collected:  11/10/18 1712    Specimen:  Blood Updated:  11/10/18 1714     Glucose 235 mg/dL     Troponin [[id number]]  (Abnormal) Collected:  11/10/18 1152    Specimen:  Blood Updated:  11/10/18 1245     Troponin I 11.376 ng/mL      Comment: Results may be falsely decreased if patient taking Biotin.       POC Glucose Once [[id number]]  (Abnormal) Collected:  11/10/18 1229    Specimen:  Blood Updated:  11/10/18 1231     Glucose 132 mg/dL     Hemoglobin A1c [[id number]]  (Abnormal) Collected:  11/10/18 0546    Specimen:  Blood Updated:  11/10/18 0838     Hemoglobin A1C 8.30 %      Narrative:       The American Diabetes Association recommends maintenance of Hemoglobin A1C at 7.0% or lower. Goals for Hemoglobin A1C reduction may need to be modified if hypoglycemia is a problem.    Troponin [[id number]]  (Abnormal) Collected:  11/10/18 0546    Specimen:  Blood Updated:  11/10/18 0723     Troponin I 15.071 ng/mL      Comment: Results may be falsely decreased if patient taking Biotin.       Basic Metabolic Panel [[id number]]  (Abnormal) Collected:  11/10/18 0546    Specimen:  Blood Updated:  11/10/18 0716     Glucose 203 mg/dL      BUN 16 mg/dL      Creatinine 0.62 mg/dL      Sodium 136 mmol/L      Potassium 3.5 mmol/L      Chloride 100 mmol/L      CO2 27.0 mmol/L      Calcium 8.9 mg/dL      eGFR Non African Amer 92 mL/min/1.73      BUN/Creatinine Ratio 25.8     Anion Gap 9.0 mmol/L     Narrative:       National Kidney Foundation Guidelines    Stage     Description        GFR  1         Normal or High     90+  2         Mild decrease      60-89  3         Moderate decrease  30-59  4         Severe decrease    15-29  5         Kidney failure     <15    The MDRD GFR formula is only valid for adults with stable renal function between ages 18 and 70.    Lipid Panel [[id number]]  (Abnormal) Collected:  11/10/18 0546    Specimen:  Blood Updated:  11/10/18 0716     Total Cholesterol 152 mg/dL      Triglycerides 379 mg/dL      HDL Cholesterol 37 mg/dL      LDL Cholesterol  86 mg/dL     Narrative:       Cholesterol Reference Ranges:   Desirable       < 200 mg/dL   Borderline    200-239 mg/dL   High Risk       > 239 mg/dL    Triglyceride Reference Ranges:   Normal          < 150 mg/dL   Borderline    150-199 mg/dL   High          200-499 mg/dL   Very High       > 499 mg/dL    HDL Reference Ranges:   Low              < 40 mg/dL   High             > 59 mg/dL    LDL Reference Ranges:   Optimal         < 100 mg/dL   Near Optimal  100-129 mg/dL   Borderline    130-159 mg/dL   High          160-189 mg/dL   Very  High       > 189 mg/dL    CBC Auto Differential [[id number]]  (Abnormal) Collected:  11/10/18 0546    Specimen:  Blood Updated:  11/10/18 0621     WBC 9.36 10*3/mm3      RBC 3.86 10*6/mm3      Hemoglobin 11.4 g/dL      Hematocrit 34.8 %      MCV 90.2 fL      MCH 29.5 pg      MCHC 32.8 g/dL      RDW 12.6 %      RDW-SD 41.0 fl      MPV 11.2 fL      Platelets 221 10*3/mm3      Neutrophil % 65.4 %      Lymphocyte % 24.6 %      Monocyte % 6.2 %      Eosinophil % 3.5 %      Basophil % 0.3 %      Immature Grans % 0.3 %      Neutrophils, Absolute 6.12 10*3/mm3      Lymphocytes, Absolute 2.30 10*3/mm3      Monocytes, Absolute 0.58 10*3/mm3      Eosinophils, Absolute 0.33 10*3/mm3      Basophils, Absolute 0.03 10*3/mm3      Immature Grans, Absolute 0.03 10*3/mm3     Troponin [[id number]]  (Abnormal) Collected:  11/10/18 0019    Specimen:  Blood Updated:  11/10/18 0413     Troponin I 11.854 ng/mL      Comment: Results may be falsely decreased if patient taking Biotin.       Comprehensive Metabolic Panel [[id number]]  (Abnormal) Collected:  11/10/18 0019    Specimen:  Blood Updated:  11/10/18 0350     Glucose 270 mg/dL      BUN 16 mg/dL      Creatinine 0.75 mg/dL      Sodium 136 mmol/L      Potassium 3.7 mmol/L      Chloride 98 mmol/L      CO2 30.0 mmol/L      Calcium 9.6 mg/dL      Total Protein 6.9 g/dL      Albumin 4.31 g/dL      ALT (SGPT) 30 U/L      AST (SGOT) 52 U/L      Alkaline Phosphatase 74 U/L      Total Bilirubin 0.2 mg/dL      eGFR Non African Amer 74 mL/min/1.73      Globulin 2.6 gm/dL      A/G Ratio 1.7 g/dL      BUN/Creatinine Ratio 21.3     Anion Gap 8.0 mmol/L     Narrative:       National Kidney Foundation Guidelines    Stage     Description        GFR  1         Normal or High     90+  2         Mild decrease      60-89  3         Moderate decrease  30-59  4         Severe decrease    15-29  5         Kidney failure     <15    The MDRD GFR formula is only valid for adults with stable renal function between ages  18 and 70.    CBC & Differential [[id number]] Collected:  11/10/18 0019    Specimen:  Blood Updated:  11/10/18 0048    Narrative:       The following orders were created for panel order CBC & Differential.  Procedure                               Abnormality         Status                     ---------                               -----------         ------                     CBC Auto Differential[[id number]]         Abnormal            Final result                 Please view results for these tests on the individual orders.    CBC Auto Differential [[id number]]  (Abnormal) Collected:  11/10/18 0019    Specimen:  Blood Updated:  11/10/18 0048     WBC 9.62 10*3/mm3      RBC 4.25 10*6/mm3      Hemoglobin 12.8 g/dL      Hematocrit 39.4 %      MCV 92.7 fL      MCH 30.1 pg      MCHC 32.5 g/dL      RDW 12.4 %      RDW-SD 42.3 fl      MPV 11.6 fL      Platelets 239 10*3/mm3      Neutrophil % 63.9 %      Lymphocyte % 26.2 %      Monocyte % 6.1 %      Eosinophil % 3.1 %      Basophil % 0.4 %      Immature Grans % 0.3 %      Neutrophils, Absolute 6.14 10*3/mm3      Lymphocytes, Absolute 2.52 10*3/mm3      Monocytes, Absolute 0.59 10*3/mm3      Eosinophils, Absolute 0.30 10*3/mm3      Basophils, Absolute 0.04 10*3/mm3      Immature Grans, Absolute 0.03 10*3/mm3     POC Glucose Once [[id number]]  (Abnormal) Collected:  11/10/18 0028    Specimen:  Blood Updated:  11/10/18 0030     Glucose 279 mg/dL     Narrative:       Meter: [id number] : [id number] [employer]          Imaging Results (most recent)     Procedure Component Value Units Date/Time    XR Chest 1 View [[id number]] Collected:  11/13/18 1623     Updated:  11/13/18 1642    Narrative:       EXAMINATION: XR CHEST 1 VW- 11/13/2018     INDICATION: productive cough; I21.4-Non-ST elevation (NSTEMI) myocardial  infarction; Z74.09-Other reduced mobility     COMPARISON: 04/04/2017     FINDINGS: The cardiac silhouette is normal. There is no pulmonary  inflammatory process.  There is no mass or effusion.           Impression:       No active disease.     D:  11/13/2018  E:  11/13/2018     This report was finalized on 11/13/2018 4:40 PM by Dr. Kendrick Stephen MD.           ECG/EMG Results (most recent)     Procedure Component Value Units Date/Time    Adult Transthoracic Echo Complete W/ Cont if Necessary Per Protocol [[id number]] Collected:  11/10/18 1356     Updated:  11/10/18 2028     BSA 1.6 m^2      IVSd 1.2 cm      LVIDd 3.4 cm      LVIDs 2.0 cm      LVPWd 1.2 cm      IVS/LVPW 0.99     FS 41.6 %      EDV(Teich) 46.0 ml      ESV(Teich) 12.1 ml      EF(Teich) 73.7 %      EDV(cubed) 37.9 ml      ESV(cubed) 7.5 ml      EF(cubed) 80.1 %      LV mass(C)d 123.2 grams      LV mass(C)dI 79.1 grams/m^2      SV(Teich) 33.9 ml      SI(Teich) 21.8 ml/m^2      SV(cubed) 30.3 ml      SI(cubed) 19.5 ml/m^2      Ao root diam 2.8 cm      Ao root area 6.3 cm^2      LA dimension 3.6 cm      LA/Ao 1.3     LAd major 5.2 cm      Ao root area (BSA corrected) 1.8     LA Volume Index 23.8 ml/m^2      MV E max neil 94.8 cm/sec      MV A max neil 121.9 cm/sec      MV E/A 0.78     MV P1/2t max neil 88.0 cm/sec      MV P1/2t 85.6 msec      MVA(P1/2t) 2.6 cm^2      MV dec slope 301.1 cm/sec^2      MV dec time 0.28 sec      PA acc slope 672.9 cm/sec^2      PA acc time 0.12 sec      RV V1 max PG 2.2 mmHg      RV V1 max 73.5 cm/sec      PA pr(Accel) 25.5 mmHg      MVA P1/2T LCG 2.5 cm^2      Lat E/e'  13.0     Med E/e' 16.8     Lat Peak E' Neil 7.2 cm/sec      Med Peak E' Neil 5.5 cm/sec       CV ECHO JUDE - BZI_BMI 24.0 kilograms/m^2       CV ECHO JUDE - BSA(HAYCOCK) 1.6 m^2       CV ECHO JUDE - BZI_METRIC_WEIGHT 57.6 kg       CV ECHO JUDE - BZI_METRIC_HEIGHT 154.9 cm      Avg E/e' ratio 14.93     TDI S' 10.60 cm/sec      RV Base 2.70 cm      RV Length 6.10 cm      RV Mid 2.30 cm      LA volume 37.0 cm3      TAPSE (>1.6) 2.30 cm2      Echo EF Estimated 60 %     Narrative:       · Left ventricular systolic function  is normal. Estimated EF = 60%.  · The following left ventricular wall segments are hypokinetic: basal   inferolateral and mid inferolateral.  · Left ventricular diastolic dysfunction (grade I a) consistent with   impaired relaxation.       ECG 12 Lead [[id number]] Collected:  11/10/18 0204     Updated:  18    Narrative:       Test Reason : chest pain  Blood Pressure : **/** mmHG  Vent. Rate : 093 BPM     Atrial Rate : 093 BPM     P-R Int : 200 ms          QRS Dur : 092 ms      QT Int : 372 ms       P-R-T Axes : 065 -50 098 degrees     QTc Int : 462 ms    Normal sinus rhythm  Left axis deviation  Nonspecific ST and T wave abnormality  Abnormal ECG  When compared with ECG of 14-MAY-2018 15:26,  Criteria for Septal infarct are no longer present  ST now depressed in Lateral leads  Inverted T waves have replaced nonspecific T wave abnormality in Lateral  leads  Confirmed by GAUDENCIO PLUNKETT MD ([id number]) on 2018 9:22:02 AM    Referred By:  STEPHY           Confirmed By:GAUDENCIO PLUNKETT MD           Physician Progress Notes (last 24 hours) (Notes from 2018  8:41 AM through 2018  8:41 AM)      Júnior Bishop MD at 2018  2:08 PM              [facility] Medicine Services  PROGRESS NOTE    Patient Name: Fani Bird  : 1935  MRN: [id number]    Date of Admission: 2018  Length of Stay: 3  Primary Care Physician: Emerson Alvarez MD    Subjective   Subjective     CC:  DMII    HPI:  Denies chest pain. New productive cough, yellow sputum.  Feels weak in general      Review of Systems  Gen- No fevers, chills  CV- No chest pain, palpitations  Resp- cough as above  GI- No N/V/D, abd pain    Otherwise ROS is negative except as mentioned in the HPI.    Objective   Objective     Vital Signs:   Temp:  [98.5 °F (36.9 °C)-98.7 °F (37.1 °C)] 98.7 °F (37.1 °C)  Heart Rate:  [68-77] 77  Resp:  [16] 16  BP: (131-163)/(66-93) 142/74        Physical Exam:  Constitutional -no  acute distress, up in chair  HEENT-NCAT, mucous membranes moist  CV-RRR, no MRG  Resp-rales right base, otherwise CTAB  Abd-soft, non-tender, non-distended, normo active bowel sounds  Ext-No lower extremity cyanosis, clubbing or edema bilaterally  Neuro-alert and oriented, speech clear, moves all extremities   Psych-normal affect   Skin- No rash on exposed UE or LE bilaterally      Results Reviewed:  I have personally reviewed current lab, radiology, and data and agree.    Results from last 7 days   Lab Units  11/10/18   0546  11/10/18   0019   WBC 10*3/mm3  9.36  9.62   HEMOGLOBIN g/dL  11.4*  12.8   HEMATOCRIT %  34.8  39.4   PLATELETS 10*3/mm3  221  239     Results from last 7 days   Lab Units  11/11/18   0418  11/10/18   1152  11/10/18   0546  11/10/18   0019   SODIUM mmol/L  137   --   136  136   POTASSIUM mmol/L  4.0   --   3.5  3.7   CHLORIDE mmol/L  102   --   100  98*   CO2 mmol/L  31.0   --   27.0  30.0   BUN mg/dL  17   --   16  16   CREATININE mg/dL  0.77   --   0.62  0.75   GLUCOSE mg/dL  238*   --   203*  270*   CALCIUM mg/dL  8.3*   --   8.9  9.6   ALT (SGPT) U/L   --    --    --   30   AST (SGOT) U/L   --    --    --   52*   TROPONIN I ng/mL   --   11.376*  15.071*  11.854*     Estimated Creatinine Clearance: 45 mL/min (by C-G formula based on SCr of 0.77 mg/dL).  No results found for: BNP    Microbiology Results Abnormal     None          Imaging Results (last 24 hours)     Procedure Component Value Units Date/Time    XR Chest 1 View [[id number]] Collected:  11/13/18 1623     Updated:  11/13/18 1642    Narrative:       EXAMINATION: XR CHEST 1 VW- 11/13/2018     INDICATION: productive cough; I21.4-Non-ST elevation (NSTEMI) myocardial  infarction; Z74.09-Other reduced mobility     COMPARISON: 04/04/2017     FINDINGS: The cardiac silhouette is normal. There is no pulmonary  inflammatory process. There is no mass or effusion.           Impression:       No active disease.     D:  11/13/2018  E:   11/13/2018     This report was finalized on 11/13/2018 4:40 PM by Dr. Kendrick Stephen MD.           Results for orders placed during the hospital encounter of 11/09/18   Adult Transthoracic Echo Complete W/ Cont if Necessary Per Protocol    Narrative · Left ventricular systolic function is normal. Estimated EF = 60%.  · The following left ventricular wall segments are hypokinetic: basal   inferolateral and mid inferolateral.  · Left ventricular diastolic dysfunction (grade I a) consistent with   impaired relaxation.          I have reviewed the medications.      amLODIPine 10 mg Oral Daily   aspirin 81 mg Oral Daily   atorvastatin 80 mg Oral Daily   budesonide 0.5 mg Nebulization BID - RT   carBAMazepine 100 mg Oral BID   carvedilol 3.125 mg Oral Q12H   chlorthalidone 25 mg Oral Daily   cholecalciferol 1,000 Units Oral Daily   clopidogrel 75 mg Oral Daily   cycloSPORINE 1 drop Both Eyes BID   diazePAM 2 mg Oral Nightly   doxycycline 100 mg Oral Q12H   enoxaparin 1 mg/kg Subcutaneous Q12H   ferrous sulfate 650 mg Oral Daily With Breakfast   fexofenadine 180 mg Oral Q12H   gabapentin 400 mg Oral 4x Daily   insulin detemir 12 Units Subcutaneous Q12H   insulin lispro 0-7 Units Subcutaneous 4x Daily With Meals & Nightly   insulin lispro 5 Units Subcutaneous TID With Meals   [START ON 11/14/2018] lactobacillus acidophilus 1 capsule Oral Daily   losartan 100 mg Oral Daily   montelukast 10 mg Oral Nightly   Morphine 15 mg Oral BID   Morphine 30 mg Oral BID   OCUVITE-LUTEIN 1 tablet Oral Daily   oxybutynin XL 5 mg Oral Daily   pantoprazole 40 mg Oral Q AM   pantoprazole 40 mg Intravenous Once   pharmacy consult - MTM  Does not apply Daily   sodium chloride 3 mL Intravenous Q12H   tiZANidine 4 mg Oral Nightly   vitamin B-12 1,000 mcg Oral Daily   ascorbic acid 1,000 mg Oral Daily         Assessment/Plan   Assessment / Plan     Active Hospital Problems    Diagnosis   • **NSTEMI (non-ST elevated myocardial infarction) (CMS/[facility])    • Hypertension     uncontrolled. I will add chlorthalidone 25 to her current regimen     • GERD (gastroesophageal reflux disease)   • Dyslipidemia     May 2012 - Total 156, triglycerides 297, HDL 47, and LDL 54; on statin therapy      • Diabetes mellitus (CMS/[facility])     Hemoglobin A1c of 8.3.     • Coronary artery disease     post percutaneous coronary intervention and stenting with low normal left ventricular ejection fraction     • Cerebrovascular disease     e. TIA/CVA, September 2013.  f. Carotid CTA, September 2013:  60% to 70% left carotid bulb stenosis, severe right vertebral stenosis.     • Chest pain     g. On 01/10/2014:  NSTEMI with 3.0 x 15 Integrity BMS to ostium of circumflex per MGR.  Normal LVEF.  h. September 2015, admission to [facility] with ACS with negative enzymes and negative EKG.             Brief Hospital Course to date:  Fani Bird is a 82 y.o. female with h/o CAD and DMII presents after three days of chest pressure.       Assessment & Plan:    Probable Bronchitis  - new productive cough  - CXR no infiltrate  - empiric doxycycline  - cbc am    DMII  - a1c 8.3  - continue Increase basal insulin from 10 to 12 units BID, and add scheduled humalog 5 units TID meals.    NSTEMI  - LHC shows multivessel disease  - poor surgical candidate due to marginal functional capacity and debilitated state from arthritis  - medical management with DAPT, statin    Generalized weakness  - PT/OT    Dyslipidemia  - LDL 86  -     HTN    Chronic Pain  - HOLLIS reviewed, adjusted meds to match home dosing.      DVT Prophylaxis:  lovenox    CODE STATUS:   Code Status and Medical Interventions:   Ordered at: 11/10/18 1124     Level Of Support Discussed With:    Patient     Code Status:    CPR     Medical Interventions (Level of Support Prior to Arrest):    Full       Disposition: I expect the patient to be discharged home in ? days.      Electronically signed by Júnior Bishop MD,  18, 9:09 PM.        Electronically signed by Júnior Bishop MD at 2018  9:14 PM          Physical Therapy Notes (most recent note)      Bernarda Montgomery, PT at 2018  3:46 PM  Version 1 of 1         Acute Care - Physical Therapy Initial Evaluation  [facility]     Patient Name: Fani Bird  : 1935  MRN: [id number]  Today's Date: 2018   Onset of Illness/Injury or Date of Surgery: 18  Date of Referral to PT: 18  Referring Physician: MD Bishop      Admit Date: 2018    Visit Dx:     ICD-10-CM ICD-9-CM   1. NSTEMI (non-ST elevated myocardial infarction) (CMS/HCC) I21.4 410.70   2. Impaired mobility and ADLs Z74.09 799.89   3. Impaired functional mobility, balance, gait, and endurance Z74.09 V49.89     Patient Active Problem List   Diagnosis   • Chest pain   • Cerebrovascular disease   • Coronary artery disease   • Hypertension   • Dyslipidemia   • GERD (gastroesophageal reflux disease)   • Vitamin D deficiency   • Diabetes mellitus (CMS/HCC)   • Dyspnea   • NSTEMI (non-ST elevated myocardial infarction) (CMS/HCC)     Past Medical History:   Diagnosis Date   • Cataract    • Cerebrovascular disease    • Chest pain    • Coronary artery disease     no recent ischemic evaluation   • Diabetes mellitus (CMS/HCC)     Hemoglobin A1c of 8.3.   • Dyslipidemia     May 2012 - Total 156, triglycerides 297, HDL 47, and LDL 54.   • Dyspnea    • GERD (gastroesophageal reflux disease)    • Hypertension    • Pneumonia    • Swelling of lower extremity     improved with stockings   • Vitamin D deficiency      Past Surgical History:   Procedure Laterality Date   • BLADDER REPAIR     • BREAST BIOPSY     • BREAST CYST ASPIRATION     • CARDIAC CATHETERIZATION     • CARPAL TUNNEL RELEASE Bilateral    • CATARACT EXTRACTION     • CHOLECYSTECTOMY     • CORONARY STENT PLACEMENT     • HYSTERECTOMY     • SPINAL FUSION          PT ASSESSMENT (last 12 hours)      Physical Therapy Evaluation      Row Name 11/13/18 1437          PT Evaluation Time/Intention    Subjective Information  complains of;weakness  -TOY     Document Type  evaluation  -TOY     Mode of Treatment  physical therapy  -TOY     Patient Effort  good  -OTY     Symptoms Noted During/After Treatment  fatigue;shortness of breath  -TOY     Row Name 11/13/18 1437          General Information    Patient Profile Reviewed?  yes  -TOY     Onset of Illness/Injury or Date of Surgery  11/09/18  -TOY     Referring Physician  MD Bishop  -TOY     Patient Observations  alert;cooperative;agree to therapy  -TOY     Patient/Family Observations  no family present  -TOY     Prior Level of Function  independent:;gait;transfer;bed mobility;ADL's  -TOY     Equipment Currently Used at Home  shower chair;walker, rolling;raised toilet;grab bar  -TOY     Pertinent History of Current Functional Problem  patient had 3 day hx of chest pain she went to her local hosp and was dx with non STEMI and was transferred to [facility]  -TOY     Existing Precautions/Restrictions  cardiac  -TOY     Risks Reviewed  patient:;LOB;increased discomfort  -TOY     Benefits Reviewed  patient:;improve function;increase independence;increase strength;decrease risk of DVT  -TOY     Row Name 11/13/18 1437          Relationship/Environment    Lives With  child(jaime), adult  -TOY     Row Name 11/13/18 1437          Resource/Environmental Concerns    Current Living Arrangements  home/apartment/condo  -TOY     Resource/Environmental Concerns  none  -TOY     Row Name 11/13/18 1437          Cognitive Assessment/Intervention- PT/OT    Orientation Status (Cognition)  oriented x 4  -TOY     Follows Commands (Cognition)  WFL  -TOY     Safety Deficit (Cognitive)  safety precautions awareness;safety precautions follow-through/compliance  -TOY     Row Name 11/13/18 1437          Safety Issues, Functional Mobility    Safety Issues Affecting Function (Mobility)  safety precautions follow-through/compliance;safety precaution awareness  -TOY      Impairments Affecting Function (Mobility)  balance;endurance/activity tolerance;strength  -     Row Name 11/13/18 1437          Bed Mobility Assessment/Treatment    Bed Mobility Assessment/Treatment  sit-supine  -TOY     Sit-Supine [place] (Bed Mobility)  contact guard  -TOY     Bed Mobility, Safety Issues  decreased use of legs for bridging/pushing  -TOY     Assistive Device (Bed Mobility)  bed rails;draw sheet  -     Row Name 11/13/18 1437          Transfer Assessment/Treatment    Transfer Assessment/Treatment  sit-stand transfer;stand-sit transfer;toilet transfer  -TOY     Sit-Stand [place] (Transfers)  contact guard  -TOY     Stand-Sit [place] (Transfers)  contact guard  -TOY     [place] Level (Toilet Transfer)  contact guard  -TOY     Assistive Device (Toilet Transfer)  commode  -     Row Name 11/13/18 1437          Sit-Stand Transfer    Assistive Device (Sit-Stand Transfers)  walker, front-wheeled  -     Row Name 11/13/18 1437          Stand-Sit Transfer    Assistive Device (Stand-Sit Transfers)  walker, front-wheeled  -     Row Name 11/13/18 1437          Toilet Transfer    Type (Toilet Transfer)  sit-stand;stand-sit  -     Row Name 11/13/18 1437          Gait/Stairs Assessment/Training    Gait/Stairs Assessment/Training  gait/ambulation independence;gait/ambulation assistive device  -     [place] Level (Gait)  contact guard  -     Assistive Device (Gait)  walker, front-wheeled  -     Distance in Feet (Gait)  150  -TOY     Pattern (Gait)  step-through  -TOY     Comment (Gait/Stairs)  patient needs cues for longer strides patient also takes extra time in turning  -     Row Name 11/13/18 1437          General ROM    GENERAL ROM COMMENTS  no ROM deficits  -     Row Name 11/13/18 1437          MMT (Manual Muscle Testing)    General MMT Comments  generalized weakness 4-/5  -     Row Name 11/13/18 1437          Motor Assessment/Intervention    Additional Documentation   Balance (Group);Therapeutic Exercise (Group);Therapeutic Exercise Interventions (Group)  -[facility] Name 11/13/18 1437          Therapeutic Exercise    Therapeutic Exercise  seated, lower extremities;seated, upper extremities  -     Additional Documentation  Therapeutic Exercise ([facility])  -[facility] Name 11/13/18 1437          Balance    Balance  static sitting balance;static standing balance  -[facility] Name 11/13/18 1437          Static Sitting Balance    Level of [place] (Unsupported Sitting, Static Balance)  contact guard assist  -     Sitting Position (Unsupported Sitting, Static Balance)  sitting on edge of bed  -     Time Able to Maintain Position (Unsupported Sitting, Static Balance)  more than 5 minutes  -[facility] Name 11/13/18 1437          Static Standing Balance    Level of [place] (Supported Standing, Static Balance)  contact guard assist  -     Time Able to Maintain Position (Supported Standing, Static Balance)  1 to 2 minutes  -     Assistive Device Utilized (Supported Standing, Static Balance)  rolling walker  -[facility] Name 11/13/18 1437          Pain Assessment    Additional Documentation  Pain Scale: Numbers Pre/Post-Treatment (Group)  -TOY     Row Name 11/13/18 1437          Pain Scale: Numbers Pre/Post-Treatment    Pain Scale: Numbers, Pretreatment  2/10  -     Pain Scale: Numbers, Post-Treatment  4/10  -     Pain Location - Side  Right  -     Pain Location  shoulder  -     Pain Intervention(s)  Heat applied;Repositioned;Ambulation/increased activity  -[facility] Name 11/13/18 1437          Coping    Observed Emotional State  calm;cooperative  -     Verbalized Emotional State  acceptance  -[facility] Name 11/13/18 1437          Plan of Care Review    Plan of Care Reviewed With  patient  -[facility] Name 11/13/18 1437          Physical Therapy Clinical Impression    Date of Referral to PT  11/13/18  -     PT Diagnosis (PT Clinical Impression)  impaired bed  mobility transfer and gait  -TOY     Patient/Family Goals Statement (PT Clinical Impression)  possible short rehab stay  -TOY     Criteria for Skilled Interventions Met (PT Clinical Impression)  yes;treatment indicated  -TOY     Rehab Potential (PT Clinical Summary)  good, to achieve stated therapy goals  -TOY     Care Plan Review (PT)  evaluation/treatment results reviewed;care plan/treatment goals reviewed;risks/benefits reviewed;patient/other agree to care plan  -     Row Name 11/13/18 1437          Physical Therapy Goals    Bed Mobility Goal Selection (PT)  bed mobility, PT goal 1  -TOY     Transfer Goal Selection (PT)  transfer, PT goal 1  -TOY     Gait Training Goal Selection (PT)  gait training, PT goal 1  -     Row Name 11/13/18 1437          Bed Mobility Goal 1 (PT)    Activity/Assistive Device (Bed Mobility Goal 1, PT)  sit to supine/supine to sit  -TOY     [place] Level/Cues Needed (Bed Mobility Goal 1, PT)  independent  -TOY     Time Frame (Bed Mobility Goal 1, PT)  long term goal (LTG);10 days  -TOY     Progress/Outcomes (Bed Mobility Goal 1, PT)  goal ongoing  -     Row Name 11/13/18 1437          Transfer Goal 1 (PT)    Activity/Assistive Device (Transfer Goal 1, PT)  sit-to-stand/stand-to-sit  -TOY     [place] Level/Cues Needed (Transfer Goal 1, PT)  independent  -TOY     Time Frame (Transfer Goal 1, PT)  long term goal (LTG);10 days  -TOY     Progress/Outcome (Transfer Goal 1, PT)  goal ongoing  -TOY     Row Name 11/13/18 1437          Gait Training Goal 1 (PT)    Activity/Assistive Device (Gait Training Goal 1, PT)  gait (walking locomotion);assistive device use;walker, rolling  -TOY     [place] Level (Gait Training Goal 1, PT)  independent  -TOY     Distance (Gait Goal 1, PT)  400  -TOY     Time Frame (Gait Training Goal 1, PT)  long term goal (LTG);10 days  -TOY     Progress/Outcome (Gait Training Goal 1, PT)  goal ongoing  -TOY     Row Name 11/13/18 1437          Patient Education Goal (PT)     Activity (Patient Education Goal, PT)  HEP  -TOY     [place]/Cues/Accuracy (Memory Goal 2, PT)  verbalizes understanding  -TOY     Time Frame (Patient Education Goal, PT)  long term goal (LTG);10 days  -TOY     Progress/Outcome (Patient Education Goal, PT)  goal ongoing  -TOY     Row Name 11/13/18 1437          Positioning and Restraints    Pre-Treatment Position  sitting in chair/recliner  -TOY     Post Treatment Position  bed  -TOY     In Bed  notified nsg;supine;call light within reach  -TOY       User Key  (r) = Recorded By, (t) = Taken By, (c) = Cosigned By    Initials Name Provider Type    Bernarda Wallis, PT Physical Therapist        Physical Therapy Education     Title: PT OT SLP Therapies (Active)     Topic: Physical Therapy (Active)     Point: Mobility training (Active)     Learning Progress Summary           Patient Acceptance, E, NR by TOY at 11/13/2018  2:37 PM                   Point: Home exercise program (Active)     Learning Progress Summary           Patient Acceptance, E, NR by TOY at 11/13/2018  2:37 PM                   Point: Body mechanics (Active)     Learning Progress Summary           Patient Acceptance, E, NR by TOY at 11/13/2018  2:37 PM                   Point: Precautions (Active)     Learning Progress Summary           Patient Acceptance, E, NR by TOY at 11/13/2018  2:37 PM                               User Key     Initials Effective Dates Name Provider Type Discipline    TOY 06/19/15 -  Bernarda Montgomery, PT Physical Therapist PT              PT Recommendation and Plan  Anticipated Discharge Disposition (PT): inpatient rehabilitation facility  Planned Therapy Interventions (PT Eval): balance training, bed mobility training, gait training, home exercise program, transfer training, strengthening  Therapy Frequency (PT Clinical Impression): daily  Outcome Summary/Treatment Plan (PT)  Anticipated Discharge Disposition (PT): inpatient rehabilitation facility  Plan of Care Reviewed  With: patient  Outcome Summary: PT eval completed patient is able to ambulate 150 ft with walker with CGA. patient fatigues easily and would benefit from IPR prior t oreturning home  Outcome Measures     Row Name 11/13/18 1437 11/13/18 1425          How much help from another person do you currently need...    Turning from your back to your side while in flat bed without using bedrails?  4  -TOY  --     Moving from lying on back to sitting on the side of a flat bed without bedrails?  3  -TOY  --     Moving to and from a bed to a chair (including a wheelchair)?  3  -TOY  --     Standing up from a chair using your arms (e.g., wheelchair, bedside chair)?  3  -TOY  --     Climbing 3-5 steps with a railing?  2  -TOY  --     To walk in hospital room?  3  -TOY  --     AM-PAC 6 Clicks Score  18  -TOY  --        How much help from another is currently needed...    Putting on and taking off regular lower body clothing?  --  3  -HK     Bathing (including washing, rinsing, and drying)  --  3  -HK     Toileting (which includes using toilet bed pan or urinal)  --  3  -HK     Putting on and taking off regular upper body clothing  --  4  -HK     Taking care of personal grooming (such as brushing teeth)  --  3  -HK     Eating meals  --  4  -HK     Score  --  20  -HK        Functional Assessment    Outcome Measure Options  AM-PAC 6 Clicks Basic Mobility (PT)  -TOY  AM-PAC 6 Clicks Daily Activity (OT)  -HK       User Key  (r) = Recorded By, (t) = Taken By, (c) = Cosigned By    Initials Name Provider Type    Bernarda Wallis, PT Physical Therapist    HK Iman Feliciano, OT Occupational Therapist         Time Calculation:   PT Charges     Row Name 11/13/18 1540             Time Calculation    Start Time  1437  -TOY      PT Received On  11/13/18  -TOY      PT Goal Re-Cert Due Date  11/23/18  -TOY        User Key  (r) = Recorded By, (t) = Taken By, (c) = Cosigned By    Initials Name Provider Type    Bernarda Wallis, PT Physical Therapist         Therapy Suggested Charges     Code   Minutes Charges    None           Therapy Charges for Today     Code Description Service Date Service Provider Modifiers Qty    30819825680 HC PT EVAL MOD COMPLEXITY 4 2018 Bernarda Montgomery, PT GP 1          PT G-Codes  Outcome Measure Options: AM-PAC 6 Clicks Basic Mobility (PT)  AM-PAC 6 Clicks Score: 18  Score: 20      Bernarda Montgomery, PT  2018         Electronically signed by Bernarda Montgomery, PT at 2018  3:46 PM          Occupational Therapy Notes (most recent note)      Iman Feliciano, OT at 2018  3:44 PM          Acute Care - Occupational Therapy Initial Evaluation  [facility]     Patient Name: Fani Bird  : 1935  MRN: [id number]  Today's Date: 2018  Onset of Illness/Injury or Date of Surgery: 18  Date of Referral to OT: 18  Referring Physician: MD Bishop    Admit Date: 2018       ICD-10-CM ICD-9-CM   1. NSTEMI (non-ST elevated myocardial infarction) (CMS/[facility]) I21.4 410.70   2. Impaired mobility and ADLs Z74.09 799.89   3. Impaired functional mobility, balance, gait, and endurance Z74.09 V49.89     Patient Active Problem List   Diagnosis   • Chest pain   • Cerebrovascular disease   • Coronary artery disease   • Hypertension   • Dyslipidemia   • GERD (gastroesophageal reflux disease)   • Vitamin D deficiency   • Diabetes mellitus (CMS/[facility])   • Dyspnea   • NSTEMI (non-ST elevated myocardial infarction) (CMS/[facility])     Past Medical History:   Diagnosis Date   • Cataract    • Cerebrovascular disease    • Chest pain    • Coronary artery disease     no recent ischemic evaluation   • Diabetes mellitus (CMS/[facility])     Hemoglobin A1c of 8.3.   • Dyslipidemia     May 2012 - Total 156, triglycerides 297, HDL 47, and LDL 54.   • Dyspnea    • GERD (gastroesophageal reflux disease)    • Hypertension    • Pneumonia    • Swelling of lower extremity     improved with stockings   • Vitamin D deficiency      Past  Surgical History:   Procedure Laterality Date   • BLADDER REPAIR     • BREAST BIOPSY     • BREAST CYST ASPIRATION     • CARDIAC CATHETERIZATION     • CARPAL TUNNEL RELEASE Bilateral    • CATARACT EXTRACTION     • CHOLECYSTECTOMY     • CORONARY STENT PLACEMENT     • HYSTERECTOMY     • SPINAL FUSION            OT ASSESSMENT FLOWSHEET (last 72 hours)      Occupational Therapy Evaluation     Row Name 11/13/18 1425                   OT Evaluation Time/Intention    Subjective Information  complains of;weakness;fatigue  -HK        Document Type  evaluation  -HK        Mode of Treatment  individual therapy;occupational therapy  -HK        Patient Effort  good  -HK        Symptoms Noted During/After Treatment  fatigue  -HK           General Information    Patient Profile Reviewed?  yes  -HK        Onset of Illness/Injury or Date of Surgery  11/09/18  -HK        Referring Physician  MD Dario  -HK        Patient Observations  alert;cooperative;agree to therapy  -HK        Patient/Family Observations  No family at bedside.   -HK        General Observations of Patient  Pt received upright in chair with IV heplocked.   -HK        Prior Level of Function  independent:;all household mobility;community mobility;gait;transfer;bed mobility;min assist:;ADL's  -HK        Equipment Currently Used at Home  shower chair;walker, rolling;raised toilet;grab bar  -HK        Pertinent History of Current Functional Problem  Pt is a 82 YOF who presents to [facility] from OSH due to non-STEMI. Pt reports 3 fay history of intermittent midsternal chest pressure. Pt reports symptoms have been occuring about 2 hours after eating. Pt has PMH of CAD s/p stent in 2014, hypertension, hyperlipidemia, and DM.    -HK        Existing Precautions/Restrictions  fall  -HK        Risks Reviewed  patient:;LOB;nausea/vomiting;dizziness;increased discomfort;change in vital signs;increased drainage;lines disloged  -HK        Benefits Reviewed  patient:;improve  function;increase independence;increase strength;decrease risk of DVT;decrease pain;increase balance;improve skin integrity;increase knowledge  -HK        Barriers to Rehab  none identified  -HK           Relationship/Environment    Primary Source of Support/Comfort  child(jaime)  -HK        Lives With  child(jaime), adult  -HK           Resource/Environmental Concerns    Current Living Arrangements  home/apartment/condo  -HK           Home Main Entrance    Number of Stairs, Main Entrance  three  -HK        Stair Railings, Main Entrance  railing on left side (ascending)  -HK           Cognitive Assessment/Interventions    Additional Documentation  Cognitive Assessment/Intervention (Group)  -HK           Cognitive Assessment/Intervention- PT/OT    Affect/Mental Status (Cognitive)  WFL  -HK        Orientation Status (Cognition)  oriented x 4  -HK        Follows Commands (Cognition)  follows one step commands;over 90% accuracy  -HK        Cognitive Function (Cognitive)  safety deficit  -HK        Safety Deficit (Cognitive)  mild deficit;safety precautions awareness;safety precautions follow-through/compliance  -HK        Personal Safety Interventions  fall prevention program maintained;gait belt;nonskid shoes/slippers when out of bed  -HK           Safety Issues, Functional Mobility    Safety Issues Affecting Function (Mobility)  safety precautions follow-through/compliance;safety precaution awareness  -HK        Impairments Affecting Function (Mobility)  balance;strength;endurance/activity tolerance;shortness of breath  -HK           Bed Mobility Assessment/Treatment    Bed Mobility Assessment/Treatment  sit-supine  -HK        Sit-Supine [place] (Bed Mobility)  contact guard  -HK        Bed Mobility, Safety Issues  decreased use of legs for bridging/pushing  -HK        Assistive Device (Bed Mobility)  bed rails  -HK           Functional Mobility    Functional Mobility- Ind. Level  contact guard assist  -HK         Functional Mobility- Device  rolling walker  -HK        Functional Mobility-Distance (Feet)  160  -HK        Functional Mobility- Safety Issues  weight-shifting ability decreased;step length decreased  -HK           Transfer Assessment/Treatment    Transfer Assessment/Treatment  sit-stand transfer;stand-sit transfer;toilet transfer  -HK        Comment (Transfers)  verbal cues for safe hand placemenet   -HK           Sit-Stand Transfer    Sit-Stand [place] (Transfers)  contact guard;verbal cues  -HK        Assistive Device (Sit-Stand Transfers)  walker, front-wheeled  -HK           Stand-Sit Transfer    Stand-Sit [place] (Transfers)  contact guard  -HK        Assistive Device (Stand-Sit Transfers)  crutches, forearm;walker, front-wheeled  -HK           Toilet Transfer    Type (Toilet Transfer)  stand-sit;sit-stand  -HK        [place] Level (Toilet Transfer)  contact guard;verbal cues  -HK        Assistive Device (Toilet Transfer)  walker, front-wheeled;raised toilet seat;grab bars/safety frame  -HK           ADL Assessment/Intervention    BADL Assessment/Intervention  lower body dressing;toileting;bathing  -HK           Bathing Assessment/Intervention    Comment (Bathing)  OT educated pt on use of tub transfer bench for tub transfers and to increase safety.   -HK           Lower Body Dressing Assessment/Training    Lower Body Dressing [place] Level  don;shoes/slippers;supervision  -HK        Lower Body Dressing Position  unsupported sitting  -HK        Comment (Lower Body Dressing)  Pt able to bend to floor to don slip on shoes  -HK           Toileting Assessment/Training    [place] Level (Toileting)  toileting skills;supervision  -HK        Toileting Position  unsupported sitting  -HK           BADL Safety/Performance    Impairments, BADL Safety/Performance  balance;endurance/activity tolerance;strength;shortness of breath  -HK           General ROM    RT Upper Ext  Rt Shoulder Flexion  -HK         LT Upper Ext  Lt Shoulder Flexion  -HK           Right Upper Ext    Rt Shoulder Flexion AROM  grossly 100 degrees AROM   -HK           Left Upper Ext    Lt Shoulder Flexion AROM  grossly 100 degrees AROM   -HK           MMT (Manual Muscle Testing)    Rt Upper Ext  Rt Shoulder Flexion  -HK        Lt Upper Ext  Lt Shoulder Flexion  -HK           MMT Right Upper Ext    Rt Shoulder Flexion MMT, Gross Movement  (3+/5) fair plus  -HK           MMT Left Upper Ext    Lt Shoulder Flexion MMT, Gross Movement  (3+/5) fair plus  -HK           Motor Assessment/Interventions    Additional Documentation  Balance (Group)  -HK           Sensory Assessment/Intervention    Sensory General Assessment  light touch sensation deficits identified  -HK           Light Touch Sensation Assessment    Left Upper Extremity: Light Touch Sensation Assessment  mild impairment, 75% or more correct responses  -HK        Comment, Left Upper Extremity: Light Touch Sensation Assessment  numbness and tingling in hands and fingers   -HK        Right Upper Extremity: Light Touch Sensation Assessment  mild impairment, 75% or more correct responses  -HK        Comment, Right Upper Extremity: Light Touch Sensation Assessment  numbness and tingling in hands and fingers   -HK           Positioning and Restraints    Pre-Treatment Position  sitting in chair/recliner  -HK        Post Treatment Position  bed  -HK        In Bed  notified nsg;supine;call light within reach;encouraged to call for assist  -HK           Pain Assessment    Additional Documentation  Pain Scale: Numbers Pre/Post-Treatment (Group)  -HK           Pain Scale: Numbers Pre/Post-Treatment    Pain Scale: Numbers, Pretreatment  0/10 - no pain  -HK        Pain Scale: Numbers, Post-Treatment  0/10 - no pain  -HK           Coping    Observed Emotional State  accepting;calm  -HK           Plan of Care Review    Plan of Care Reviewed With  patient  -HK           Clinical Impression (OT)    Date  of Referral to OT  11/13/18  -HK        OT Diagnosis  Decreased independence with ADLs and Mobility   -HK        Patient/Family Goals Statement (OT Eval)  Pt would like to improve and return home.   -HK        Criteria for Skilled Therapeutic Interventions Met (OT Eval)  yes;treatment indicated  -HK        Rehab Potential (OT Eval)  good, to achieve stated therapy goals  -HK        Therapy Frequency (OT Eval)  daily  -HK        Care Plan Review (OT)  evaluation/treatment results reviewed;care plan/treatment goals reviewed;patient/other agree to care plan  -HK        Anticipated Equipment Needs at Discharge (OT)  tub bench  -HK        Anticipated Discharge Disposition (OT)  inpatient rehabilitation facility  -HK           Vital Signs    Pre Systolic BP Rehab  -- RN cleared for tx  -HK        Pre Patient Position  Sitting  -HK        Intra Patient Position  Standing  -HK        Post Patient Position  Supine  -HK           OT Goals    Bed Mobility Goal Selection (OT)  bed mobility, OT goal 1  -HK        Transfer Goal Selection (OT)  transfer, OT goal 1  -HK        Dressing Goal Selection (OT)  dressing, OT goal 1  -HK        Grooming Goal Selection (OT)  grooming, OT goal 1  -HK        Additional Documentation  Grooming Goal Selection (OT) (Row)  -HK           Bed Mobility Goal 1 (OT)    Activity/Assistive Device (Bed Mobility Goal 1, OT)  sit to supine/supine to sit;scooting  -HK        [place] Level/Cues Needed (Bed Mobility Goal 1, OT)  supervision required  -HK        Time Frame (Bed Mobility Goal 1, OT)  5 days  -HK        Progress/Outcomes (Bed Mobility Goal 1, OT)  goal ongoing  -HK           Transfer Goal 1 (OT)    Activity/Assistive Device (Transfer Goal 1, OT)  sit-to-stand/stand-to-sit;toilet  -HK        [place] Level/Cues Needed (Transfer Goal 1, OT)  supervision required  -HK        Time Frame (Transfer Goal 1, OT)  5 days  -HK        Progress/Outcome (Transfer Goal 1, OT)  goal ongoing  -HK            Dressing Goal 1 (OT)    Activity/Assistive Device (Dressing Goal 1, OT)  lower body dressing  -HK        [place]/Cues Needed (Dressing Goal 1, OT)  independent  -HK        Time Frame (Dressing Goal 1, OT)  5 days  -HK        Progress/Outcome (Dressing Goal 1, OT)  goal ongoing  -HK           Grooming Goal 1 (OT)    Activity/Device (Grooming Goal 1, OT)  hair care;oral care;wash face, hands  -HK        [place] (Grooming Goal 1, OT)  minimum assist (75% or more patient effort);verbal cues required  -HK        Time Frame (Grooming Goal 1, OT)  5 days  -HK        Progress/Outcome (Grooming Goal 1, OT)  goal ongoing  -HK           Living Environment    Home Accessibility  stairs to enter home;tub/shower is not walk in  -HK          User Key  (r) = Recorded By, (t) = Taken By, (c) = Cosigned By    Initials Name Effective Dates     Iman Feliciano, OT 03/07/18 -          Occupational Therapy Education     Title: PT OT SLP Therapies (Active)     Topic: Occupational Therapy (Active)     Point: ADL training (Done)     Description: Instruct learner(s) on proper safety adaptation and remediation techniques during self care or transfers.   Instruct in proper use of assistive devices.    Learning Progress Summary           Patient Acceptance, E,TB, VU by  at 11/13/2018  2:25 PM    Comment:  Pt educated on ADL retraining with LBD and toileting, safety precautions, and appropriate body mechanics.                   Point: Precautions (Done)     Description: Instruct learner(s) on prescribed precautions during self-care and functional transfers.    Learning Progress Summary           Patient Acceptance, E,TB, VU by  at 11/13/2018  2:25 PM    Comment:  Pt educated on ADL retraining with LBD and toileting, safety precautions, and appropriate body mechanics.                   Point: Body mechanics (Done)     Description: Instruct learner(s) on proper positioning and spine alignment during self-care, functional  mobility activities and/or exercises.    Learning Progress Summary           Patient Acceptance, E,TB, VU by  at 11/13/2018  2:25 PM    Comment:  Pt educated on ADL retraining with LBD and toileting, safety precautions, and appropriate body mechanics.                               User Key     Initials Effective Dates Name Provider Type [facility] 03/07/18 -  Iman Feliciano, OT Occupational Therapist OT                  OT Recommendation and Plan  Outcome Summary/Treatment Plan (OT)  Anticipated Equipment Needs at Discharge (OT): tub bench  Anticipated Discharge Disposition (OT): inpatient rehabilitation facility  Therapy Frequency (OT Eval): daily  Plan of Care Review  Plan of Care Reviewed With: patient  Plan of Care Reviewed With: patient  Outcome Summary: OT eval complete. Pt compelted LBD and toileting with supervision. Pt completes sit to stand with CGA and ambulates with CGA and RW. Pt demonstrates decreased BUE strength and activity tolerance. Recommend discharge to [facility] prior to returning home.     Outcome Measures     Row Name 11/13/18 1424             How much help from another is currently needed...    Putting on and taking off regular lower body clothing?  3  -HK      Bathing (including washing, rinsing, and drying)  3  -HK      Toileting (which includes using toilet bed pan or urinal)  3  -HK      Putting on and taking off regular upper body clothing  4  -HK      Taking care of personal grooming (such as brushing teeth)  3  -HK      Eating meals  4  -HK      Score  20  -HK         Functional Assessment    Outcome Measure Options  AM-PAC 6 Clicks Daily Activity (OT)  -        User Key  (r) = Recorded By, (t) = Taken By, (c) = Cosigned By    Initials Name Provider Type     Iman Feliciano, OT Occupational Therapist          Time Calculation:   Time Calculation- OT     Row Name 11/13/18 1425             Time Calculation- OT    OT Start Time  1425  -      OT Received On  11/13/18  -      OT  Goal Re-Cert Due Date  11/23/18  -        User Key  (r) = Recorded By, (t) = Taken By, (c) = Cosigned By    Initials Name Provider Type    Iman Peterson OT Occupational Therapist        Therapy Suggested Charges     Code   Minutes Charges    None           Therapy Charges for Today     Code Description Service Date Service Provider Modifiers Qty    [id number] HC OT EVAL MOD COMPLEXITY 4 11/13/2018 Iman Feliciano OT GO 1               Iman Feliciano OT  11/13/2018    Electronically signed by Iman Feliciano OT at 11/13/2018  3:44 PM

## 2018-11-14 NOTE — DISCHARGE PLACEMENT REQUEST
"Alfreda Perez RN  Case Management  P: [phone]    Home health referral      Fani Bird (82 y.o. Female)     Date of Birth Social Security Number Address Home Phone MRN    [id number]  [id number]   [facility] [address] [phone] [id number]    Orthodox Marital Status          Other        Admission Date Admission Type Admitting Provider Attending Provider Department, Room/Bed    18 Urgent Júnior Bishop MD Sloan, Walker E, MD [facility] 6A, N615/1    Discharge Date Discharge Disposition Discharge Destination                       Attending Provider:  Júnior Bishop MD    Allergies:  Isosorbide, Augmentin [Amoxicillin-pot Clavulanate]    Isolation:  None   Infection:  None   Code Status:  CPR    Ht:  154.9 cm (60.98\")   Wt:  57.6 kg (127 lb)    Admission Cmt:  None   Principal Problem:  NSTEMI (non-ST elevated myocardial infarction) (CMS/[facility]) [I21.4]                 Active Insurance as of 2018     Primary Coverage     Payor Plan Insurance Group Employer/Plan Group    HUMANA MEDICARE REPLACEMENT HUMANA MEDICARE REPL [id number]     Payor Plan Address Payor Plan Phone Number Payor Plan Fax Number Effective Dates    [address] [address] [phone]  2018 - None Entered    [facility] [address]       Subscriber Name Subscriber Birth Date Member ID       FANI BIRD 1935 [id number]                 Emergency Contacts      (Rel.) Home Phone Work Phone Mobile Phone    Yajaira Marie (Daughter) [phone] -- --               History & Physical      Katarzyna Garner DO at 11/10/2018 12:18 AM              [facility]  HISTORY AND PHYSICAL    Patient Name: Fani Bird  : 1935  MRN: [id number]  Primary Care Physician: Emerson Alvarez MD  Date of admission: 2018      Subjective   Subjective     Chief Complaint:  Chest pain    HPI:  Fani Bird is a 82 y.o. female with a past " medical history significant for coronary artery disease status post stent in 2014, hypertension, hyperlipidemia, diabetes mellitus who presents as a transfer from [facility] due to non-STEMI.  Patient reports a 3 day history of intermittent midsternal chest pressure.  She reports that her symptoms have been occurring about 2 hours after eating and she thought her chest pressure may be related to acid reflux.  Today, the patient had recurrence of midsternal chest pain 3 hours after breakfast, more severe.  She presented to an outside ED for further evaluation.  She reports complete resolution of pain after receiving Nitropaste.  She denies associated shortness of breath, diaphoresis, nausea, vomiting, palpitations, presyncope.    Review of Systems     Otherwise 10-system ROS reviewed and is negative except as mentioned in the HPI.    Personal History     Past Medical History:   Diagnosis Date   • Cataract    • Cerebrovascular disease    • Chest pain    • Coronary artery disease     no recent ischemic evaluation   • Diabetes mellitus (CMS/[facility])     Hemoglobin A1c of 8.3.   • Dyslipidemia     May 2012 - Total 156, triglycerides 297, HDL 47, and LDL 54.   • Dyspnea    • GERD (gastroesophageal reflux disease)    • Hypertension    • Pneumonia    • Swelling of lower extremity     improved with stockings   • Vitamin D deficiency        Past Surgical History:   Procedure Laterality Date   • BLADDER REPAIR     • BREAST BIOPSY     • BREAST CYST ASPIRATION     • CARDIAC CATHETERIZATION     • CARPAL TUNNEL RELEASE Bilateral    • CATARACT EXTRACTION     • CHOLECYSTECTOMY     • CORONARY STENT PLACEMENT     • HYSTERECTOMY     • SPINAL FUSION         Family History: family history includes Diabetes in her brother and father; Heart attack in her father and maternal grandmother; Heart disease in her father and mother; Hypertension in her brother, father, and mother; Liver cancer in her brother; No Known Problems in her paternal  grandfather and paternal grandmother; Stroke in her maternal grandfather and maternal grandmother.     Social History:  reports that she has never smoked. She has never used smokeless tobacco. She reports that she does not drink alcohol or use drugs.  Social History     Social History Narrative    Caffeine: 3 servings per day    Patient lives with her urmila       Medications:  Available home medication information reviewed     Allergies   Allergen Reactions   • Isosorbide    • Augmentin [Amoxicillin-Pot Clavulanate] Diarrhea and Rash       Objective   Objective     Vital Signs:   Heart Rate:  [94-95] 94  Resp:  [14] 14  BP: (149-173)/(73-81) 149/73        Physical Exam   Constitutional: No acute distress, awake, alert  Eyes: PERRLA, sclerae anicteric, no conjunctival injection  HENT: NCAT, mucous membranes moist  Neck: Supple, no thyromegaly, no lymphadenopathy, trachea midline  Respiratory: Clear to auscultation bilaterally, nonlabored respirations   Cardiovascular: RRR, no murmurs, rubs, or gallops, palpable pedal pulses bilaterally  Gastrointestinal: Positive bowel sounds, soft, nontender, nondistended  Musculoskeletal: No bilateral ankle edema, no clubbing or cyanosis to extremities  Psychiatric: Appropriate affect, cooperative  Neurologic: Oriented x 3, strength symmetric in all extremities, Cranial Nerves grossly intact to confrontation, speech clear  Skin: No rashes      Results Reviewed:  I have personally reviewed current lab, radiology, and data and agree.  Laboratory data from outside hospital  WBC 11.3, hemoglobin 13.6, hematocrit 40.4, platelet count 236  INR 0.9  UA: 1+ protein, 2+ glucose  Sodium 133, potassium 4.0, chloride 95, bicarbonate 28, BUN 17, creatinine 0.54, glucose 237, calcium 9.3, total protein 8.4, albumin 3.9, total bilirubin 0.2, AST 35, ALT 30, alkaline phosphatase 70  Initial troponin 1.15, second troponin 3.69, CK-MB 7.5 elevated,  elevated, myoglobin 220 elevated    EKG  with left axis deviation, LVH            Invalid input(s):  ALKPHOS, TROPONININT  CrCl cannot be calculated (Patient's most recent lab result is older than the maximum 30 days allowed.).  Brief Urine Lab Results     None        No results found for: BNP  Imaging Results (last 24 hours)     ** No results found for the last 24 hours. **             Assessment/Plan   Assessment / Plan     Active Hospital Problems    Diagnosis   • **NSTEMI (non-ST elevated myocardial infarction) (CMS/[facility])   • Hypertension     uncontrolled. I will add chlorthalidone 25 to her current regimen     • GERD (gastroesophageal reflux disease)   • Dyslipidemia     May 2012 - Total 156, triglycerides 297, HDL 47, and LDL 54; on statin therapy      • Diabetes mellitus (CMS/[facility])     Hemoglobin A1c of 8.3.     • Coronary artery disease     post percutaneous coronary intervention and stenting with low normal left ventricular ejection fraction     • Cerebrovascular disease     a. TIA/CVA, September 2013.  b. Carotid CTA, September 2013:  60% to 70% left carotid bulb stenosis, severe right vertebral stenosis.     • Chest pain     c. On 01/10/2014:  NSTEMI with 3.0 x 15 Integrity BMS to ostium of circumflex per MGR.  Normal LVEF.  d. September 2015, admission to [facility] with ACS with negative enzymes and negative EKG.        This is an 82-year-old female patient with a past medical history significant for coronary artery disease status post stent, diabetes mellitus, hypertension who presents as a transfer from an outside facility with chest pain and found to have a non-STEMI.    Assessment & Plan:  Non-STEMI  --Patient given Lovenox (at 1954) and Nitropaste at outside hospital with resolution of chest pain.  Defer to cardiology regarding continuing Lovenox  --Monitor on telemetry  --Nothing by mouth after midnight, cardiology consult  --Atorvastatin, aspirin, Coreg, losartan  --Fasting lipid panel, hemoglobin A1c in a.m.  --Continue to  trend troponin, trending upward at OSH  --EKG in a.m.  --Defer to cardiology regarding ordering echocardiogram  --Gentle IVFs    Diabetes mellitus  --Reduced dose of long-acting insulin  --Sliding scale insulin with Accu-Cheks    Hypertension/hyperlipidemia  --Atorvastatin, Coreg, losartan chlorthalidone    GERD  --Continue with home Protonix    DVT prophylaxis:  Lovenox SCDs  CODE STATUS:    Code Status and Medical Interventions:   Ordered at: 11/10/18 0022     Level Of Support Discussed With:    Patient     Code Status:    CPR     Medical Interventions (Level of Support Prior to Arrest):    Full       Admission Status:  I believe this patient meets INPATIENT status due to the need for care which can only be reasonably provided in an hospital setting such as aggressive/expedited ancillary services and/or consultation services, the necessity for IV medications, close physician monitoring and/or the possible need for procedures.  In such, I feel patient’s risk for adverse outcomes and need for care warrant INPATIENT evaluation and predict the patient’s care encounter to likely last beyond 2 midnights.      Electronically signed by Katarzyna Garner DO, 11/10/18, 12:18 AM.          Electronically signed by Katarzyna Garner DO at 11/10/2018 12:42 AM       Hospital Medications (active)       Dose Frequency Start End    acetaminophen (TYLENOL) tablet 650 mg 650 mg Every 4 Hours PRN 11/10/2018     Sig - Route: Take 2 tablets by mouth Every 4 (Four) Hours As Needed for Mild Pain . - Oral    albuterol (PROVENTIL) nebulizer solution 0.083% 2.5 mg/3mL 2.5 mg Every 4 Hours PRN 11/10/2018     Sig - Route: Take 2.5 mg by nebulization Every 4 (Four) Hours As Needed for Shortness of Air. - Nebulization    amLODIPine (NORVASC) tablet 10 mg 10 mg Daily 11/10/2018     Sig - Route: Take 1 tablet by mouth Daily. - Oral    aspirin chewable tablet 81 mg 81 mg Daily 11/10/2018     Sig - Route: Chew 1 tablet Daily. - Oral    atorvastatin  (LIPITOR) tablet 80 mg 80 mg Daily 11/13/2018     Sig - Route: Take 2 tablets by mouth Daily. - Oral    budesonide (PULMICORT) nebulizer solution 0.5 mg 0.5 mg 2 Times Daily - RT 11/10/2018     Sig - Route: Take 2 mL by nebulization 2 (Two) Times a Day. - Nebulization    carBAMazepine (TEGretol) tablet 100 mg 100 mg 2 Times Daily 11/10/2018     Sig - Route: Take 0.5 tablets by mouth 2 (Two) Times a Day. - Oral    carvedilol (COREG) tablet 3.125 mg 3.125 mg Every 12 Hours Scheduled 11/10/2018     Sig - Route: Take 1 tablet by mouth Every 12 (Twelve) Hours. - Oral    chlorthalidone (HYGROTON) tablet 25 mg 25 mg Daily 11/10/2018     Sig - Route: Take 1 tablet by mouth Daily. - Oral    cholecalciferol (VITAMIN D3) tablet 1,000 Units 1,000 Units Daily 11/10/2018     Sig - Route: Take 1 tablet by mouth Daily. - Oral    clopidogrel (PLAVIX) tablet 75 mg 75 mg Daily 11/12/2018     Sig - Route: Take 1 tablet by mouth Daily. - Oral    cycloSPORINE (RESTASIS) 0.05 % ophthalmic emulsion 1 drop 1 drop 2 Times Daily 11/10/2018     Sig - Route: Administer 1 drop to both eyes 2 (Two) Times a Day. - Both Eyes    dextrose (D50W) 25 g/ 50mL Intravenous Solution 25 g 25 g Every 15 Minutes PRN 11/10/2018     Sig - Route: Infuse 50 mL into a venous catheter Every 15 (Fifteen) Minutes As Needed for Low Blood Sugar (Blood Sugar Less Than 70). - Intravenous    dextrose (GLUTOSE) oral gel 15 g 15 g Every 15 Minutes PRN 11/10/2018     Sig - Route: Take 15 g by mouth Every 15 (Fifteen) Minutes As Needed for Low Blood Sugar (Blood sugar less than 70). - Oral    diazePAM (VALIUM) tablet 2 mg 2 mg Nightly 11/10/2018     Sig - Route: Take 1 tablet by mouth Every Night. - Oral    doxycycline (MONODOX) capsule 100 mg 100 mg Every 12 Hours Scheduled 11/13/2018 11/18/2018    Sig - Route: Take 1 capsule by mouth Every 12 (Twelve) Hours. - Oral    enoxaparin (LOVENOX) syringe 60 mg 1 mg/kg × 57.6 kg Every 12 Hours 11/10/2018     Sig - Route: Inject 0.6  mL under the skin into the appropriate area as directed Every 12 (Twelve) Hours. - Subcutaneous    ferrous sulfate tablet 650 mg 650 mg Daily With Breakfast 11/11/2018     Sig - Route: Take 2 tablets by mouth Daily With Breakfast. - Oral    fexofenadine (ALLEGRA) tablet 180 mg 180 mg Every 12 Hours 11/10/2018     Sig - Route: Take 1 tablet by mouth Every 12 (Twelve) Hours. - Oral    Notes to Pharmacy: Takes patient supply allegra    gabapentin (NEURONTIN) capsule 400 mg 400 mg 4 Times Daily 11/10/2018     Sig - Route: Take 1 capsule by mouth 4 (Four) Times a Day. - Oral    glucagon (human recombinant) (GLUCAGEN DIAGNOSTIC) injection 1 mg 1 mg As Needed 11/10/2018     Sig - Route: Inject 1 mg under the skin into the appropriate area as directed As Needed (Blood Glucose Less Than 70). - Subcutaneous    hydrALAZINE (APRESOLINE) tablet 25 mg 25 mg 3 Times Daily PRN 11/11/2018     Sig - Route: Take 1 tablet by mouth 3 (Three) Times a Day As Needed (SBP > 160mmHg). - Oral    insulin detemir (LEVEMIR) injection 12 Units 12 Units Every 12 Hours Scheduled 11/12/2018     Sig - Route: Inject 12 Units under the skin into the appropriate area as directed Every 12 (Twelve) Hours. - Subcutaneous    insulin lispro (humaLOG) injection 0-7 Units 0-7 Units 4 Times Daily With Meals & Nightly 11/10/2018     Sig - Route: Inject 0-7 Units under the skin into the appropriate area as directed 4 (Four) Times a Day With Meals & at Bedtime. - Subcutaneous    insulin lispro (humaLOG) injection 5 Units 5 Units 3 Times Daily With Meals 11/12/2018     Sig - Route: Inject 5 Units under the skin into the appropriate area as directed 3 (Three) Times a Day With Meals. - Subcutaneous    lactobacillus acidophilus (RISAQUAD) capsule 1 capsule 1 capsule Daily 11/14/2018     Sig - Route: Take 1 capsule by mouth Daily. - Oral    losartan (COZAAR) tablet 100 mg 100 mg Daily 11/10/2018     Sig - Route: Take 2 tablets by mouth Daily. - Oral    montelukast  "(SINGULAIR) tablet 10 mg 10 mg Nightly 11/10/2018     Sig - Route: Take 1 tablet by mouth Every Night. - Oral    Morphine (MS CONTIN) 12 hr tablet 15 mg 15 mg 2 Times Daily 11/13/2018 11/23/2018    Sig - Route: Take 1 tablet by mouth 2 (Two) Times a Day. - Oral    Morphine (MS CONTIN) 12 hr tablet 30 mg 30 mg 2 Times Daily 11/13/2018     Sig - Route: Take 1 tablet by mouth 2 (Two) Times a Day. - Oral    nitroglycerin (NITROSTAT) SL tablet 0.4 mg 0.4 mg Every 5 Minutes PRN 11/10/2018     Sig - Route: Place 1 tablet under the tongue Every 5 (Five) Minutes As Needed for Chest Pain. - Sublingual    nitroglycerin 50 mg/250 mL (0.2 mg/mL) infusion 10-50 mcg/min Titrated 11/10/2018     Sig - Route: Infuse 10-50 mcg/min into a venous catheter Dose Adjusted By Provider As Needed. - Intravenous    OCUVITE-LUTEIN (OCUVITE) tablet 1 tablet 1 tablet Daily 11/10/2018     Sig - Route: Take 1 tablet by mouth Daily. - Oral    ondansetron (ZOFRAN) injection 4 mg 4 mg Every 6 Hours PRN 11/10/2018     Sig - Route: Infuse 2 mL into a venous catheter Every 6 (Six) Hours As Needed for Nausea or Vomiting. - Intravenous    Linked Group 1:  \"Or\" Linked Group Details        ondansetron (ZOFRAN) tablet 4 mg 4 mg Every 6 Hours PRN 11/10/2018     Sig - Route: Take 1 tablet by mouth Every 6 (Six) Hours As Needed for Nausea or Vomiting. - Oral    Linked Group 1:  \"Or\" Linked Group Details        oxybutynin XL (DITROPAN-XL) 24 hr tablet 5 mg 5 mg Daily 11/10/2018     Sig - Route: Take 1 tablet by mouth Daily. - Oral    pantoprazole (PROTONIX) EC tablet 40 mg 40 mg Every Early Morning 11/10/2018     Sig - Route: Take 1 tablet by mouth Every Morning. - Oral    pantoprazole (PROTONIX) injection 40 mg 40 mg Once 11/13/2018 11/13/2018    Sig - Route: Infuse 10 mL into a venous catheter 1 (One) Time. - Intravenous    Pharmacy Consult - MTM  Daily 11/13/2018     Sig - Route: Daily. - Does not apply    sodium chloride 0.9 % flush 3 mL 3 mL Every 12 Hours " Scheduled 11/10/2018     Sig - Route: Infuse 3 mL into a venous catheter Every 12 (Twelve) Hours. - Intravenous    sodium chloride 0.9 % flush 3-10 mL 3-10 mL As Needed 11/10/2018     Sig - Route: Infuse 3-10 mL into a venous catheter As Needed for Line Care. - Intravenous    sodium chloride 0.9 % infusion 1-3 mL/kg/hr × 57.6 kg Continuous 11/10/2018     Sig - Route: Infuse 57.6-172.8 mL/hr into a venous catheter Continuous. - Intravenous    tiZANidine (ZANAFLEX) tablet 4 mg 4 mg Nightly 11/10/2018     Sig - Route: Take 1 tablet by mouth Every Night. - Oral    vitamin B-12 (CYANOCOBALAMIN) tablet 1,000 mcg 1,000 mcg Daily 11/10/2018     Sig - Route: Take 1 tablet by mouth Daily. - Oral    vitamin C (ASCORBIC ACID) tablet 1,000 mg 1,000 mg Daily 11/10/2018     Sig - Route: Take 2 tablets by mouth Daily. - Oral          Lab Results (most recent)     Procedure Component Value Units Date/Time    POC Glucose Once [[id number]]  (Abnormal) Collected:  11/14/18 1105    Specimen:  Blood Updated:  11/14/18 1107     Glucose 329 mg/dL     BNP [[id number]]  (Abnormal) Collected:  11/14/18 0539    Specimen:  Blood Updated:  11/14/18 0807     .0 pg/mL      Comment: Results may be falsely decreased if patient taking Biotin.       POC Glucose Once [[id number]]  (Abnormal) Collected:  11/14/18 0724    Specimen:  Blood Updated:  11/14/18 0732     Glucose 196 mg/dL     Basic Metabolic Panel [[id number]]  (Abnormal) Collected:  11/14/18 0539    Specimen:  Blood Updated:  11/14/18 0700     Glucose 186 mg/dL      BUN 22 mg/dL      Creatinine 0.67 mg/dL      Sodium 132 mmol/L      Potassium 3.9 mmol/L      Chloride 95 mmol/L      CO2 27.0 mmol/L      Calcium 8.9 mg/dL      eGFR Non African Amer 84 mL/min/1.73      BUN/Creatinine Ratio 32.8     Anion Gap 10.0 mmol/L     Narrative:       National Kidney Foundation Guidelines    Stage     Description        GFR  1         Normal or High     90+  2         Mild decrease      60-89  3          Moderate decrease  30-59  4         Severe decrease    15-29  5         Kidney failure     <15    The MDRD GFR formula is only valid for adults with stable renal function between ages 18 and 70.    CBC & Differential [[id number]] Collected:  11/14/18 0539    Specimen:  Blood Updated:  11/14/18 0622    Narrative:       The following orders were created for panel order CBC & Differential.  Procedure                               Abnormality         Status                     ---------                               -----------         ------                     CBC Auto Differential[[id number]]        Abnormal            Final result                 Please view results for these tests on the individual orders.    CBC Auto Differential [[id number]]  (Abnormal) Collected:  11/14/18 0539    Specimen:  Blood Updated:  11/14/18 0622     WBC 8.40 10*3/mm3      RBC 3.67 10*6/mm3      Hemoglobin 10.9 g/dL      Hematocrit 33.1 %      MCV 90.2 fL      MCH 29.7 pg      MCHC 32.9 g/dL      RDW 12.7 %      RDW-SD 41.7 fl      MPV 11.4 fL      Platelets 208 10*3/mm3      Neutrophil % 62.5 %      Lymphocyte % 24.2 %      Monocyte % 8.1 %      Eosinophil % 4.8 %      Basophil % 0.4 %      Immature Grans % 0.5 %      Neutrophils, Absolute 5.26 10*3/mm3      Lymphocytes, Absolute 2.03 10*3/mm3      Monocytes, Absolute 0.68 10*3/mm3      Eosinophils, Absolute 0.40 10*3/mm3      Basophils, Absolute 0.03 10*3/mm3      Immature Grans, Absolute 0.04 10*3/mm3     POC Glucose Once [[id number]]  (Abnormal) Collected:  11/13/18 2003    Specimen:  Blood Updated:  11/13/18 2007     Glucose 322 mg/dL     POC Glucose Once [[id number]]  (Abnormal) Collected:  11/13/18 1623    Specimen:  Blood Updated:  11/13/18 1624     Glucose 303 mg/dL     POC Glucose Once [[id number]]  (Abnormal) Collected:  11/13/18 1121    Specimen:  Blood Updated:  11/13/18 1122     Glucose 266 mg/dL     POC Glucose Once [[id number]]  (Abnormal) Collected:  11/13/18 0719    Specimen:   Blood Updated:  11/13/18 0720     Glucose 184 mg/dL     P2Y12 Platelet Inhibition [[id number]] Collected:  11/13/18 0513    Specimen:  Blood Updated:  11/13/18 0637     P2Y12 Reactivity Unit 150 PRU     Narrative:       P2Y12 Interpretation:  Pre-Drug normal reference range is 194-418 PRU.  Test results are reported in P2Y12 reaction units (PRU). This measures the extent of platelet aggregation in the presence of P2Y12 inhibitor drugs, such as clopidogrel (Plavix), prasugrel (Effient), ticagrelor (Brilinta), ticlopidine (Ticlid).  P2Y12 values <194 PRU (low end of reference range) are specific evidence of a P2Y12 inhibitor effect.  Patients who have been treated with Glycoprotein IIb/IIIa inhibitors should not be tested until platelet function has recovered. This time period is approximately 14 days after discontinuation of abciximab (ReoPro) and up to 48 hours after discontinuation of eptifibatide (Integrilin) and tirofiban (Aggrastat).   The P2Y12 test results should be interpreted in conjunction with other clinical and lab data available to the clinician.    POC Glucose Once [[id number]]  (Abnormal) Collected:  11/12/18 1921    Specimen:  Blood Updated:  11/12/18 1943     Glucose 357 mg/dL     POC Glucose Once [[id number]]  (Abnormal) Collected:  11/12/18 1629    Specimen:  Blood Updated:  11/12/18 1632     Glucose 358 mg/dL     POC Glucose Once [[id number]]  (Abnormal) Collected:  11/12/18 1110    Specimen:  Blood Updated:  11/12/18 1112     Glucose 335 mg/dL     POC Glucose Once [[id number]]  (Abnormal) Collected:  11/12/18 0722    Specimen:  Blood Updated:  11/12/18 0726     Glucose 254 mg/dL     POC Glucose Once [[id number]]  (Abnormal) Collected:  11/11/18 2026    Specimen:  Blood Updated:  11/11/18 2028     Glucose 322 mg/dL     POC Glucose Once [[id number]]  (Abnormal) Collected:  11/11/18 1621    Specimen:  Blood Updated:  11/11/18 1623     Glucose 253 mg/dL     POC Glucose Once [[id number]]  (Abnormal)  Collected:  11/11/18 1106    Specimen:  Blood Updated:  11/11/18 1108     Glucose 272 mg/dL     P2Y12 Platelet Inhibition [[id number]] Collected:  11/11/18 0418    Specimen:  Blood Updated:  11/11/18 0739     P2Y12 Reactivity Unit 154 PRU     Narrative:       P2Y12 Interpretation:  Pre-Drug normal reference range is 194-418 PRU.  Test results are reported in P2Y12 reaction units (PRU). This measures the extent of platelet aggregation in the presence of P2Y12 inhibitor drugs, such as clopidogrel (Plavix), prasugrel (Effient), ticagrelor (Brilinta), ticlopidine (Ticlid).  P2Y12 values <194 PRU (low end of reference range) are specific evidence of a P2Y12 inhibitor effect.  Patients who have been treated with Glycoprotein IIb/IIIa inhibitors should not be tested until platelet function has recovered. This time period is approximately 14 days after discontinuation of abciximab (ReoPro) and up to 48 hours after discontinuation of eptifibatide (Integrilin) and tirofiban (Aggrastat).   The P2Y12 test results should be interpreted in conjunction with other clinical and lab data available to the clinician.    POC Glucose Once [[id number]]  (Abnormal) Collected:  11/11/18 0637    Specimen:  Blood Updated:  11/11/18 0639     Glucose 222 mg/dL     Basic Metabolic Panel [[id number]]  (Abnormal) Collected:  11/11/18 0418    Specimen:  Blood Updated:  11/11/18 0618     Glucose 238 mg/dL      BUN 17 mg/dL      Creatinine 0.77 mg/dL      Sodium 137 mmol/L      Potassium 4.0 mmol/L      Chloride 102 mmol/L      CO2 31.0 mmol/L      Calcium 8.3 mg/dL      eGFR Non African Amer 72 mL/min/1.73      BUN/Creatinine Ratio 22.1     Anion Gap 4.0 mmol/L     Narrative:       National Kidney Foundation Guidelines    Stage     Description        GFR  1         Normal or High     90+  2         Mild decrease      60-89  3         Moderate decrease  30-59  4         Severe decrease    15-29  5         Kidney failure     <15    The MDRD GFR formula  is only valid for adults with stable renal function between ages 18 and 70.    Lipid Panel [[id number]]  (Abnormal) Collected:  11/11/18 0418    Specimen:  Blood Updated:  11/11/18 0618     Total Cholesterol 137 mg/dL      Triglycerides 278 mg/dL      HDL Cholesterol 36 mg/dL      LDL Cholesterol  78 mg/dL     Narrative:       Cholesterol Reference Ranges:   Desirable       < 200 mg/dL   Borderline    200-239 mg/dL   High Risk       > 239 mg/dL    Triglyceride Reference Ranges:   Normal          < 150 mg/dL   Borderline    150-199 mg/dL   High          200-499 mg/dL   Very High       > 499 mg/dL    HDL Reference Ranges:   Low              < 40 mg/dL   High             > 59 mg/dL    LDL Reference Ranges:   Optimal         < 100 mg/dL   Near Optimal  100-129 mg/dL   Borderline    130-159 mg/dL   High          160-189 mg/dL   Very High       > 189 mg/dL    POC Glucose Once [[id number]]  (Abnormal) Collected:  11/11/18 0335    Specimen:  Blood Updated:  11/11/18 0337     Glucose 263 mg/dL     POC Glucose Once [[id number]]  (Abnormal) Collected:  11/11/18 0011    Specimen:  Blood Updated:  11/11/18 0013     Glucose 261 mg/dL     POC Glucose Once [[id number]]  (Abnormal) Collected:  11/10/18 2030    Specimen:  Blood Updated:  11/10/18 2031     Glucose 254 mg/dL     POC Glucose Once [[id number]]  (Abnormal) Collected:  11/10/18 1712    Specimen:  Blood Updated:  11/10/18 1714     Glucose 235 mg/dL     Troponin [[id number]]  (Abnormal) Collected:  11/10/18 1152    Specimen:  Blood Updated:  11/10/18 1245     Troponin I 11.376 ng/mL      Comment: Results may be falsely decreased if patient taking Biotin.       POC Glucose Once [[id number]]  (Abnormal) Collected:  11/10/18 1229    Specimen:  Blood Updated:  11/10/18 1231     Glucose 132 mg/dL     Hemoglobin A1c [[id number]]  (Abnormal) Collected:  11/10/18 0546    Specimen:  Blood Updated:  11/10/18 0838     Hemoglobin A1C 8.30 %     Narrative:       The American Diabetes Association  recommends maintenance of Hemoglobin A1C at 7.0% or lower. Goals for Hemoglobin A1C reduction may need to be modified if hypoglycemia is a problem.    Troponin [[id number]]  (Abnormal) Collected:  11/10/18 0546    Specimen:  Blood Updated:  11/10/18 0723     Troponin I 15.071 ng/mL      Comment: Results may be falsely decreased if patient taking Biotin.       Basic Metabolic Panel [[id number]]  (Abnormal) Collected:  11/10/18 0546    Specimen:  Blood Updated:  11/10/18 0716     Glucose 203 mg/dL      BUN 16 mg/dL      Creatinine 0.62 mg/dL      Sodium 136 mmol/L      Potassium 3.5 mmol/L      Chloride 100 mmol/L      CO2 27.0 mmol/L      Calcium 8.9 mg/dL      eGFR Non African Amer 92 mL/min/1.73      BUN/Creatinine Ratio 25.8     Anion Gap 9.0 mmol/L     Narrative:       National Kid[employer] Guidelines    Stage     Description        GFR  1         Normal or High     90+  2         Mild decrease      60-89  3         Moderate decrease  30-59  4         Severe decrease    15-29  5         Kidney failure     <15    The MDRD GFR formula is only valid for adults with stable renal function between ages 18 and 70.    Lipid Panel [[id number]]  (Abnormal) Collected:  11/10/18 0546    Specimen:  Blood Updated:  11/10/18 0716     Total Cholesterol 152 mg/dL      Triglycerides 379 mg/dL      HDL Cholesterol 37 mg/dL      LDL Cholesterol  86 mg/dL     Narrative:       Cholesterol Reference Ranges:   Desirable       < 200 mg/dL   Borderline    200-239 mg/dL   High Risk       > 239 mg/dL    Triglyceride Reference Ranges:   Normal          < 150 mg/dL   Borderline    150-199 mg/dL   High          200-499 mg/dL   Very High       > 499 mg/dL    HDL Reference Ranges:   Low              < 40 mg/dL   High             > 59 mg/dL    LDL Reference Ranges:   Optimal         < 100 mg/dL   Near Optimal  100-129 mg/dL   Borderline    130-159 mg/dL   High          160-189 mg/dL   Very High       > 189 mg/dL    CBC Auto Differential  [[id number]]  (Abnormal) Collected:  11/10/18 0546    Specimen:  Blood Updated:  11/10/18 0621     WBC 9.36 10*3/mm3      RBC 3.86 10*6/mm3      Hemoglobin 11.4 g/dL      Hematocrit 34.8 %      MCV 90.2 fL      MCH 29.5 pg      MCHC 32.8 g/dL      RDW 12.6 %      RDW-SD 41.0 fl      MPV 11.2 fL      Platelets 221 10*3/mm3      Neutrophil % 65.4 %      Lymphocyte % 24.6 %      Monocyte % 6.2 %      Eosinophil % 3.5 %      Basophil % 0.3 %      Immature Grans % 0.3 %      Neutrophils, Absolute 6.12 10*3/mm3      Lymphocytes, Absolute 2.30 10*3/mm3      Monocytes, Absolute 0.58 10*3/mm3      Eosinophils, Absolute 0.33 10*3/mm3      Basophils, Absolute 0.03 10*3/mm3      Immature Grans, Absolute 0.03 10*3/mm3     Troponin [[id number]]  (Abnormal) Collected:  11/10/18 0019    Specimen:  Blood Updated:  11/10/18 0413     Troponin I 11.854 ng/mL      Comment: Results may be falsely decreased if patient taking Biotin.       Comprehensive Metabolic Panel [[id number]]  (Abnormal) Collected:  11/10/18 0019    Specimen:  Blood Updated:  11/10/18 0350     Glucose 270 mg/dL      BUN 16 mg/dL      Creatinine 0.75 mg/dL      Sodium 136 mmol/L      Potassium 3.7 mmol/L      Chloride 98 mmol/L      CO2 30.0 mmol/L      Calcium 9.6 mg/dL      Total Protein 6.9 g/dL      Albumin 4.31 g/dL      ALT (SGPT) 30 U/L      AST (SGOT) 52 U/L      Alkaline Phosphatase 74 U/L      Total Bilirubin 0.2 mg/dL      eGFR Non African Amer 74 mL/min/1.73      Globulin 2.6 gm/dL      A/G Ratio 1.7 g/dL      BUN/Creatinine Ratio 21.3     Anion Gap 8.0 mmol/L     Narrative:       National Kidney Foundation Guidelines    Stage     Description        GFR  1         Normal or High     90+  2         Mild decrease      60-89  3         Moderate decrease  30-59  4         Severe decrease    15-29  5         Kidney failure     <15    The MDRD GFR formula is only valid for adults with stable renal function between ages 18 and 70.    CBC & Differential [[id number]]  Collected:  11/10/18 0019    Specimen:  Blood Updated:  11/10/18 0048    Narrative:       The following orders were created for panel order CBC & Differential.  Procedure                               Abnormality         Status                     ---------                               -----------         ------                     CBC Auto Differential[[id number]]         Abnormal            Final result                 Please view results for these tests on the individual orders.    CBC Auto Differential [[id number]]  (Abnormal) Collected:  11/10/18 0019    Specimen:  Blood Updated:  11/10/18 0048     WBC 9.62 10*3/mm3      RBC 4.25 10*6/mm3      Hemoglobin 12.8 g/dL      Hematocrit 39.4 %      MCV 92.7 fL      MCH 30.1 pg      MCHC 32.5 g/dL      RDW 12.4 %      RDW-SD 42.3 fl      MPV 11.6 fL      Platelets 239 10*3/mm3      Neutrophil % 63.9 %      Lymphocyte % 26.2 %      Monocyte % 6.1 %      Eosinophil % 3.1 %      Basophil % 0.4 %      Immature Grans % 0.3 %      Neutrophils, Absolute 6.14 10*3/mm3      Lymphocytes, Absolute 2.52 10*3/mm3      Monocytes, Absolute 0.59 10*3/mm3      Eosinophils, Absolute 0.30 10*3/mm3      Basophils, Absolute 0.04 10*3/mm3      Immature Grans, Absolute 0.03 10*3/mm3     POC Glucose Once [[id number]]  (Abnormal) Collected:  11/10/18 0028    Specimen:  Blood Updated:  11/10/18 0030     Glucose 279 mg/dL     Narrative:       Meter: [id number] : [id number] [employer]          Imaging Results (most recent)     Procedure Component Value Units Date/Time    XR Chest 1 View [[id number]] Collected:  11/13/18 1623     Updated:  11/13/18 1642    Narrative:       EXAMINATION: XR CHEST 1 VW- 11/13/2018     INDICATION: productive cough; I21.4-Non-ST elevation (NSTEMI) myocardial  infarction; Z74.09-Other reduced mobility     COMPARISON: 04/04/2017     FINDINGS: The cardiac silhouette is normal. There is no pulmonary  inflammatory process. There is no mass or effusion.            Impression:       No active disease.     D:  11/13/2018  E:  11/13/2018     This report was finalized on 11/13/2018 4:40 PM by Dr. Kendrick Stephen MD.           ECG/EMG Results (most recent)     Procedure Component Value Units Date/Time    Adult Transthoracic Echo Complete W/ Cont if Necessary Per Protocol [[id number]] Collected:  11/10/18 1356     Updated:  11/10/18 2028     BSA 1.6 m^2      IVSd 1.2 cm      LVIDd 3.4 cm      LVIDs 2.0 cm      LVPWd 1.2 cm      IVS/LVPW 0.99     FS 41.6 %      EDV(Teich) 46.0 ml      ESV(Teich) 12.1 ml      EF(Teich) 73.7 %      EDV(cubed) 37.9 ml      ESV(cubed) 7.5 ml      EF(cubed) 80.1 %      LV mass(C)d 123.2 grams      LV mass(C)dI 79.1 grams/m^2      SV(Teich) 33.9 ml      SI(Teich) 21.8 ml/m^2      SV(cubed) 30.3 ml      SI(cubed) 19.5 ml/m^2      Ao root diam 2.8 cm      Ao root area 6.3 cm^2      LA dimension 3.6 cm      LA/Ao 1.3     LAd major 5.2 cm      Ao root area (BSA corrected) 1.8     LA Volume Index 23.8 ml/m^2      MV E max neil 94.8 cm/sec      MV A max neil 121.9 cm/sec      MV E/A 0.78     MV P1/2t max neil 88.0 cm/sec      MV P1/2t 85.6 msec      MVA(P1/2t) 2.6 cm^2      MV dec slope 301.1 cm/sec^2      MV dec time 0.28 sec      PA acc slope 672.9 cm/sec^2      PA acc time 0.12 sec      RV V1 max PG 2.2 mmHg      RV V1 max 73.5 cm/sec      PA pr(Accel) 25.5 mmHg      MVA P1/2T LCG 2.5 cm^2      Lat E/e'  13.0     Med E/e' 16.8     Lat Peak E' Neil 7.2 cm/sec      Med Peak E' Neil 5.5 cm/sec       CV ECHO JUDE - BZI_BMI 24.0 kilograms/m^2      BH CV ECHO JUDE - BSA(HAYCOCK) 1.6 m^2      BH CV ECHO JUDE - BZI_METRIC_WEIGHT 57.6 kg      BH CV ECHO JUDE - BZI_METRIC_HEIGHT 154.9 cm      Avg E/e' ratio 14.93     TDI S' 10.60 cm/sec      RV Base 2.70 cm      RV Length 6.10 cm      RV Mid 2.30 cm      LA volume 37.0 cm3      TAPSE (>1.6) 2.30 cm2      Echo EF Estimated 60 %     Narrative:       · Left ventricular systolic function is normal. Estimated EF = 60%.  · The  following left ventricular wall segments are hypokinetic: basal   inferolateral and mid inferolateral.  · Left ventricular diastolic dysfunction (grade I a) consistent with   impaired relaxation.       ECG 12 Lead [[id number]] Collected:  11/10/18 0204     Updated:  18    Narrative:       Test Reason : chest pain  Blood Pressure : **/** mmHG  Vent. Rate : 093 BPM     Atrial Rate : 093 BPM     P-R Int : 200 ms          QRS Dur : 092 ms      QT Int : 372 ms       P-R-T Axes : 065 -50 098 degrees     QTc Int : 462 ms    Normal sinus rhythm  Left axis deviation  Nonspecific ST and T wave abnormality  Abnormal ECG  When compared with ECG of 14-MAY-2018 15:26,  Criteria for Septal infarct are no longer present  ST now depressed in Lateral leads  Inverted T waves have replaced nonspecific T wave abnormality in Lateral  leads  Confirmed by GAUDENCIO PLUNKETT MD ([id number]) on 2018 9:22:02 AM    Referred By:  STEPHY           Confirmed By:GAUDENCIO PLUNKETT MD        Operative/Procedure Notes (most recent note)     No notes of this type exist for this encounter.           Physician Progress Notes (last 24 hours) (Notes from 2018 12:53 PM through 2018 12:53 PM)      Júnior Bishop MD at 2018 10:40 AM              Psychiatric Medicine Services  PROGRESS NOTE    Patient Name: Fani Bird  : 1935  MRN: [id number]    Date of Admission: 2018  Length of Stay: 4  Primary Care Physician: Emerson Alvarez MD    Subjective   Subjective     CC:  DMII    HPI:  Still some cough, yellow sputum. Feels like strength is improving.      Review of Systems  Gen- No fevers, chills  CV- No chest pain, palpitations  Resp- cough as above  GI- No N/V/D, abd pain    Otherwise ROS is negative except as mentioned in the HPI.    Objective   Objective     Vital Signs:   Temp:  [98.4 °F (36.9 °C)-98.7 °F (37.1 °C)] 98.4 °F (36.9 °C)  Heart Rate:  [68-83] 74  Resp:  [16-20] 16  BP:  (131-183)/(66-83) 159/69        Physical Exam:    Constitutional -non toxic, up in chair  HEENT-NCAT, mucous membranes moist  CV-RRR, no MRG  Resp-CTAB  Abd-soft, non-tender, non-distended, normo active bowel sounds  Ext-No lower extremity cyanosis, clubbing or edema bilaterally  Neuro-alert and oriented, speech clear, moves all extremities   Psych-normal affect   Skin- No rash on exposed UE or LE bilaterally      Results Reviewed:  I have personally reviewed current lab, radiology, and data and agree.    Results from last 7 days   Lab Units  11/14/18   0539  11/10/18   0546  11/10/18   0019   WBC 10*3/mm3  8.40  9.36  9.62   HEMOGLOBIN g/dL  10.9*  11.4*  12.8   HEMATOCRIT %  33.1*  34.8  39.4   PLATELETS 10*3/mm3  208  221  239     Results from last 7 days   Lab Units  11/14/18   0539  11/11/18   0418  11/10/18   1152  11/10/18   0546  11/10/18   0019   SODIUM mmol/L  132  137   --   136  136   POTASSIUM mmol/L  3.9  4.0   --   3.5  3.7   CHLORIDE mmol/L  95*  102   --   100  98*   CO2 mmol/L  27.0  31.0   --   27.0  30.0   BUN mg/dL  22  17   --   16  16   CREATININE mg/dL  0.67  0.77   --   0.62  0.75   GLUCOSE mg/dL  186*  238*   --   203*  270*   CALCIUM mg/dL  8.9  8.3*   --   8.9  9.6   ALT (SGPT) U/L   --    --    --    --   30   AST (SGOT) U/L   --    --    --    --   52*   TROPONIN I ng/mL   --    --   11.376*  15.071*  11.854*     Estimated Creatinine Clearance: 45 mL/min (by C-G formula based on SCr of 0.67 mg/dL).  BNP   Date Value Ref Range Status   11/14/2018 165.0 (H) 0.0 - 100.0 pg/mL Final     Comment:     Results may be falsely decreased if patient taking Biotin.       Microbiology Results Abnormal     None          Imaging Results (last 24 hours)     Procedure Component Value Units Date/Time    XR Chest 1 View [[id number]] Collected:  11/13/18 1623     Updated:  11/13/18 1642    Narrative:       EXAMINATION: XR CHEST 1 VW- 11/13/2018     INDICATION: productive cough; I21.4-Non-ST elevation  (NSTEMI) myocardial  infarction; Z74.09-Other reduced mobility     COMPARISON: 04/04/2017     FINDINGS: The cardiac silhouette is normal. There is no pulmonary  inflammatory process. There is no mass or effusion.           Impression:       No active disease.     D:  11/13/2018  E:  11/13/2018     This report was finalized on 11/13/2018 4:40 PM by Dr. Kendrick Stephen MD.           Results for orders placed during the hospital encounter of 11/09/18   Adult Transthoracic Echo Complete W/ Cont if Necessary Per Protocol    Narrative · Left ventricular systolic function is normal. Estimated EF = 60%.  · The following left ventricular wall segments are hypokinetic: basal   inferolateral and mid inferolateral.  · Left ventricular diastolic dysfunction (grade I a) consistent with   impaired relaxation.          I have reviewed the medications.      amLODIPine 10 mg Oral Daily   aspirin 81 mg Oral Daily   atorvastatin 80 mg Oral Daily   budesonide 0.5 mg Nebulization BID - RT   carBAMazepine 100 mg Oral BID   carvedilol 3.125 mg Oral Q12H   chlorthalidone 25 mg Oral Daily   cholecalciferol 1,000 Units Oral Daily   clopidogrel 75 mg Oral Daily   cycloSPORINE 1 drop Both Eyes BID   diazePAM 2 mg Oral Nightly   doxycycline 100 mg Oral Q12H   enoxaparin 1 mg/kg Subcutaneous Q12H   ferrous sulfate 650 mg Oral Daily With Breakfast   fexofenadine 180 mg Oral Q12H   gabapentin 400 mg Oral 4x Daily   insulin detemir 12 Units Subcutaneous Q12H   insulin lispro 0-7 Units Subcutaneous 4x Daily With Meals & Nightly   insulin lispro 5 Units Subcutaneous TID With Meals   lactobacillus acidophilus 1 capsule Oral Daily   losartan 100 mg Oral Daily   montelukast 10 mg Oral Nightly   Morphine 15 mg Oral BID   Morphine 30 mg Oral BID   OCUVITE-LUTEIN 1 tablet Oral Daily   oxybutynin XL 5 mg Oral Daily   pantoprazole 40 mg Oral Q AM   pharmacy consult - MTM  Does not apply Daily   sodium chloride 3 mL Intravenous Q12H   tiZANidine 4 mg Oral Nightly    vitamin B-12 1,000 mcg Oral Daily   ascorbic acid 1,000 mg Oral Daily         Assessment/Plan   Assessment / Plan     Active Hospital Problems    Diagnosis   • **NSTEMI (non-ST elevated myocardial infarction) (CMS/[facility])   • Hypertension     uncontrolled. I will add chlorthalidone 25 to her current regimen     • GERD (gastroesophageal reflux disease)   • Dyslipidemia     May 2012 - Total 156, triglycerides 297, HDL 47, and LDL 54; on statin therapy      • Diabetes mellitus (CMS/[facility])     Hemoglobin A1c of 8.3.     • Coronary artery disease     post percutaneous coronary intervention and stenting with low normal left ventricular ejection fraction     • Cerebrovascular disease     e. TIA/CVA, September 2013.  f. Carotid CTA, September 2013:  60% to 70% left carotid bulb stenosis, severe right vertebral stenosis.     • Chest pain     g. On 01/10/2014:  NSTEMI with 3.0 x 15 Integrity BMS to ostium of circumflex per MGR.  Normal LVEF.  h. September 2015, admission to [facility] with ACS with negative enzymes and negative EKG.             Brief Hospital Course to date:  Fani Bird is a 82 y.o. female with h/o CAD and DMII presents after three days of chest pressure.       Assessment & Plan:    Probable Bronchitis  - new productive cough  - CXR no infiltrate; no leukocytosis or fever  - continue doxy x 5 days    DMII  - a1c 8.3  - continue Increase basal insulin from 10 to 12 units BID, and add scheduled humalog 5 units TID meals.    NSTEMI  - [facility] shows multivessel disease  - poor surgical candidate due to marginal functional capacity and debilitated state from arthritis  - medical management with DAPT, statin    Generalized weakness  - PT/OT  - rehab recommended, patient and family pondering.    Dyslipidemia  - LDL 86  -     HTN    Chronic Pain  - HOLLIS reviewed, adjusted meds to match home dosing.    Anemia      DVT Prophylaxis:  lovenox    CODE STATUS:   Code Status and Medical  Interventions:   Ordered at: 11/10/18 1124     Level Of Support Discussed With:    Patient     Code Status:    CPR     Medical Interventions (Level of Support Prior to Arrest):    Full       Disposition: I expect the patient to be discharged home vs rehab in 0-1 days.      Electronically signed by Júnior Bishop MD, 18, 10:40 AM.        Electronically signed by Júnior Bishop MD at 2018 10:43 AM     Júnior Bishop MD at 2018  2:08 PM              [facility] Medicine Services  PROGRESS NOTE    Patient Name: Fani Bird  : 1935  MRN: [id number]    Date of Admission: 2018  Length of Stay: 3  Primary Care Physician: Emerson Alvarez MD    Subjective   Subjective     CC:  DMII    HPI:  Denies chest pain. New productive cough, yellow sputum.  Feels weak in general      Review of Systems  Gen- No fevers, chills  CV- No chest pain, palpitations  Resp- cough as above  GI- No N/V/D, abd pain    Otherwise ROS is negative except as mentioned in the HPI.    Objective   Objective     Vital Signs:   Temp:  [98.5 °F (36.9 °C)-98.7 °F (37.1 °C)] 98.7 °F (37.1 °C)  Heart Rate:  [68-77] 77  Resp:  [16] 16  BP: (131-163)/(66-93) 142/74        Physical Exam:  Constitutional -no acute distress, up in chair  HEENT-NCAT, mucous membranes moist  CV-RRR, no MRG  Resp-rales right base, otherwise CTAB  Abd-soft, non-tender, non-distended, normo active bowel sounds  Ext-No lower extremity cyanosis, clubbing or edema bilaterally  Neuro-alert and oriented, speech clear, moves all extremities   Psych-normal affect   Skin- No rash on exposed UE or LE bilaterally      Results Reviewed:  I have personally reviewed current lab, radiology, and data and agree.    Results from last 7 days   Lab Units  11/10/18   0546  11/10/18   0019   WBC 10*3/mm3  9.36  9.62   HEMOGLOBIN g/dL  11.4*  12.8   HEMATOCRIT %  34.8  39.4   PLATELETS 10*3/mm3  221  239     Results from last 7 days   Lab Units   11/11/18   0418  11/10/18   1152  11/10/18   0546  11/10/18   0019   SODIUM mmol/L  137   --   136  136   POTASSIUM mmol/L  4.0   --   3.5  3.7   CHLORIDE mmol/L  102   --   100  98*   CO2 mmol/L  31.0   --   27.0  30.0   BUN mg/dL  17   --   16  16   CREATININE mg/dL  0.77   --   0.62  0.75   GLUCOSE mg/dL  238*   --   203*  270*   CALCIUM mg/dL  8.3*   --   8.9  9.6   ALT (SGPT) U/L   --    --    --   30   AST (SGOT) U/L   --    --    --   52*   TROPONIN I ng/mL   --   11.376*  15.071*  11.854*     Estimated Creatinine Clearance: 45 mL/min (by C-G formula based on SCr of 0.77 mg/dL).  No results found for: BNP    Microbiology Results Abnormal     None          Imaging Results (last 24 hours)     Procedure Component Value Units Date/Time    XR Chest 1 View [[id number]] Collected:  11/13/18 1623     Updated:  11/13/18 1642    Narrative:       EXAMINATION: XR CHEST 1 VW- 11/13/2018     INDICATION: productive cough; I21.4-Non-ST elevation (NSTEMI) myocardial  infarction; Z74.09-Other reduced mobility     COMPARISON: 04/04/2017     FINDINGS: The cardiac silhouette is normal. There is no pulmonary  inflammatory process. There is no mass or effusion.           Impression:       No active disease.     D:  11/13/2018  E:  11/13/2018     This report was finalized on 11/13/2018 4:40 PM by Dr. Kendrick Stephen MD.           Results for orders placed during the hospital encounter of 11/09/18   Adult Transthoracic Echo Complete W/ Cont if Necessary Per Protocol    Narrative · Left ventricular systolic function is normal. Estimated EF = 60%.  · The following left ventricular wall segments are hypokinetic: basal   inferolateral and mid inferolateral.  · Left ventricular diastolic dysfunction (grade I a) consistent with   impaired relaxation.          I have reviewed the medications.      amLODIPine 10 mg Oral Daily   aspirin 81 mg Oral Daily   atorvastatin 80 mg Oral Daily   budesonide 0.5 mg Nebulization BID - RT   carBAMazepine 100  mg Oral BID   carvedilol 3.125 mg Oral Q12H   chlorthalidone 25 mg Oral Daily   cholecalciferol 1,000 Units Oral Daily   clopidogrel 75 mg Oral Daily   cycloSPORINE 1 drop Both Eyes BID   diazePAM 2 mg Oral Nightly   doxycycline 100 mg Oral Q12H   enoxaparin 1 mg/kg Subcutaneous Q12H   ferrous sulfate 650 mg Oral Daily With Breakfast   fexofenadine 180 mg Oral Q12H   gabapentin 400 mg Oral 4x Daily   insulin detemir 12 Units Subcutaneous Q12H   insulin lispro 0-7 Units Subcutaneous 4x Daily With Meals & Nightly   insulin lispro 5 Units Subcutaneous TID With Meals   [START ON 11/14/2018] lactobacillus acidophilus 1 capsule Oral Daily   losartan 100 mg Oral Daily   montelukast 10 mg Oral Nightly   Morphine 15 mg Oral BID   Morphine 30 mg Oral BID   OCUVITE-LUTEIN 1 tablet Oral Daily   oxybutynin XL 5 mg Oral Daily   pantoprazole 40 mg Oral Q AM   pantoprazole 40 mg Intravenous Once   pharmacy consult - MTM  Does not apply Daily   sodium chloride 3 mL Intravenous Q12H   tiZANidine 4 mg Oral Nightly   vitamin B-12 1,000 mcg Oral Daily   ascorbic acid 1,000 mg Oral Daily         Assessment/Plan   Assessment / Plan     Active Hospital Problems    Diagnosis   • **NSTEMI (non-ST elevated myocardial infarction) (CMS/[facility])   • Hypertension     uncontrolled. I will add chlorthalidone 25 to her current regimen     • GERD (gastroesophageal reflux disease)   • Dyslipidemia     May 2012 - Total 156, triglycerides 297, HDL 47, and LDL 54; on statin therapy      • Diabetes mellitus (CMS/[facility])     Hemoglobin A1c of 8.3.     • Coronary artery disease     post percutaneous coronary intervention and stenting with low normal left ventricular ejection fraction     • Cerebrovascular disease     i. TIA/CVA, September 2013.  j. Carotid CTA, September 2013:  60% to 70% left carotid bulb stenosis, severe right vertebral stenosis.     • Chest pain     k. On 01/10/2014:  NSTEMI with 3.0 x 15 Integrity BMS to ostium of circumflex per MGR.  Normal  LVEF.  l. September 2015, admission to [facility] with ACS with negative enzymes and negative EKG.             Brief Hospital Course to date:  Fani Bird is a 82 y.o. female with h/o CAD and DMII presents after three days of chest pressure.       Assessment & Plan:    Probable Bronchitis  - new productive cough  - CXR no infiltrate  - empiric doxycycline  - cbc am    DMII  - a1c 8.3  - continue Increase basal insulin from 10 to 12 units BID, and add scheduled humalog 5 units TID meals.    NSTEMI  - [facility] shows multivessel disease  - poor surgical candidate due to marginal functional capacity and debilitated state from arthritis  - medical management with DAPT, statin    Generalized weakness  - PT/OT    Dyslipidemia  - LDL 86  -     HTN    Chronic Pain  - HOLLIS reviewed, adjusted meds to match home dosing.      DVT Prophylaxis:  lovenox    CODE STATUS:   Code Status and Medical Interventions:   Ordered at: 11/10/18 1124     Level Of Support Discussed With:    Patient     Code Status:    CPR     Medical Interventions (Level of Support Prior to Arrest):    Full       Disposition: I expect the patient to be discharged home in ? days.      Electronically signed by Júnior Bishop MD, 11/13/18, 9:09 PM.        Electronically signed by Júnior Bishop MD at 11/13/2018  9:14 PM          Consult Notes (most recent note)      Chavez Cornejo MD at 11/13/2018  7:04 AM          Consult Note  Fani Bird  [id number]  1935    Referring Provider:  Reason for Consultation: Coronary artery disease    Patient Care Team:  Emerson Alvarez MD as PCP - General (Family Medicine)  Emerson Alvarez MD as PCP - Claims Attributed    Chief complaint: Chest pain, non-STEMI myocardial infarction      History of present illness: Patient presents with chest discomfort and pain.  The patient has been admitted with a non-STEMI myocardial infarction.  Abdomen acid patient about surgical opinion.  The  patient is very limited.  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  The patient also has severe arthritis and can barely get around with a walker.    Review of Systems    The following systems were reviewed and negative;  eyes, ENT, gastrointestinal, genitourinary, integument, breast, hematologic / lymphatic, behavioral/psych, endocrine and allergies / immunologic    History  Past Medical History:   Diagnosis Date   • Cataract    • Cerebrovascular disease    • Chest pain    • Coronary artery disease     no recent ischemic evaluation   • Diabetes mellitus (CMS/[facility])     Hemoglobin A1c of 8.3.   • Dyslipidemia     May 2012 - Total 156, triglycerides 297, HDL 47, and LDL 54.   • Dyspnea    • GERD (gastroesophageal reflux disease)    • Hypertension    • Pneumonia    • Swelling of lower extremity     improved with stockings   • Vitamin D deficiency    , Past Surgical History:   Procedure Laterality Date   • BLADDER REPAIR     • BREAST BIOPSY     • BREAST CYST ASPIRATION     • CARDIAC CATHETERIZATION     • CARPAL TUNNEL RELEASE Bilateral    • CATARACT EXTRACTION     • CHOLECYSTECTOMY     • CORONARY STENT PLACEMENT     • HYSTERECTOMY     • SPINAL FUSION     , Family History   Problem Relation Age of Onset   • Heart disease Mother    • Hypertension Mother    • Heart disease Father    • Hypertension Father    • Heart attack Father    • Diabetes Father    • Diabetes Brother    • Liver cancer Brother    • Hypertension Brother    • Stroke Maternal Grandmother    • Heart attack Maternal Grandmother    • Stroke Maternal Grandfather    • No Known Problems Paternal Grandmother    • No Known Problems Paternal Grandfather    , Social History     Tobacco Use   • Smoking status: Never Smoker   • Smokeless tobacco: Never Used   Substance Use Topics   • Alcohol use: No   • Drug use: No   , Medications Prior to Admission   Medication Sig Dispense Refill Last Dose   • amLODIPine (NORVASC) 10 MG tablet Take 10 mg by mouth Daily.   Past  Week at Unknown time   • ascorbic acid (VITAMIN C) 1000 MG tablet Take 1 tablet by mouth Daily.   Past Week at Unknown time   • aspirin 81 MG tablet Take  by mouth daily.   Past Week at Unknown time   • atorvastatin (LIPITOR) 10 MG tablet Take 20 mg by mouth Daily.   Past Week at Unknown time   • celecoxib (CELEBREX) 200 MG capsule Take  by mouth Daily.   Past Week at Unknown time   • chlorthalidone (HYGROTON) 25 MG tablet Take 1 tablet by mouth Daily. 90 tablet 2 Past Week at Unknown time   • Cholecalciferol (VITAMIN D PO) Take  by mouth daily.   Past Week at Unknown time   • Cranberry 1000 MG capsule Take  by mouth Daily.   Past Week at Unknown time   • diazepam (VALIUM) 2 MG tablet Take 2 mg by mouth Every Night.   Past Week at Unknown time   • estropipate (OGEN) 1.5 MG tablet Take  by mouth daily.   Past Week at Unknown time   • ferrous sulfate 325 (65 FE) MG tablet Take  by mouth Daily. 2 daily   Past Week at Unknown time   • fexofenadine (ALLEGRA) 180 MG tablet Take 180 mg by mouth 2 (Two) Times a Day.   Past Week at Unknown time   • fluticasone (FLOVENT HFA) 220 MCG/ACT inhaler daily.   Past Week at Unknown time   • gabapentin (NEURONTIN) 400 MG capsule Take 400 mg by mouth 4 (Four) Times a Day.   Past Week at Unknown time   • insulin aspart prot-insulin aspart (novoLOG 70/30) (70-30) 100 UNIT/ML injection Inject  under the skin into the appropriate area as directed 2 (Two) Times a Day With Meals. 34 in morning and 22 a night   Past Week at Unknown time   • losartan (COZAAR) 100 MG tablet Take  by mouth daily.   Past Week at Unknown time   • mometasone (NASONEX) 50 MCG/ACT nasal spray 2 sprays into each nostril Daily.   Past Week at Unknown time   • montelukast (SINGULAIR) 10 MG tablet Take 10 mg by mouth Every Night.   Past Week at Unknown time   • Morphine (MS CONTIN) 30 MG 12 hr tablet Take 30 mg by mouth 2 (Two) Times a Day.   Past Week at Unknown time   • Morphine (MSIR) 15 MG tablet Take 15 mg by mouth  Every 4 (Four) Hours As Needed for Severe Pain .   Past Week at Unknown time   • Multiple Vitamins-Minerals (EYE VITAMINS PO) Take  by mouth Daily.   Taking   • nitrofurantoin, macrocrystal-monohydrate, (MACROBID) 100 MG capsule Take 50 mg by mouth Daily.   Past Week at Unknown time   • nitroglycerin (NITROSTAT) 0.4 MG SL tablet Place 1 tablet under the tongue Every 5 (Five) Minutes As Needed for Chest Pain. 25 tablet 3 Past Week at Unknown time   • O2 (OXYGEN) Inhale 2 L/min Every Night.   Past Week at Unknown time   • Omega-3 Fatty Acids (FISH OIL PO) Take  by mouth daily.   Past Week at Unknown time   • pantoprazole (PROTONIX) 40 MG EC tablet Take  by mouth 2 (two) times a day.   Past Week at Unknown time   • tiZANidine (ZANAFLEX) 4 MG tablet Take  by mouth Daily.   Past Week at Unknown time   • tolterodine LA (DETROL LA) 4 MG 24 hr capsule Take 1 capsule by mouth daily.   Past Week at Unknown time   • albuterol (PROVENTIL) (2.5 MG/3ML) 0.083% nebulizer solution Take 2.5 mg by nebulization Every 4 (Four) Hours As Needed.   Taking   • albuterol (VENTOLIN HFA) 108 (90 BASE) MCG/ACT inhaler 2 (Two) Times a Day.   Taking   • carBAMazepine (TEGRETOL) 200 MG tablet Take 100 mg by mouth 2 (Two) Times a Day.   Taking   • Cyanocobalamin (VITAMIN B-12 PO) Take 1 tablet by mouth 1 (one) time per week.   Taking   • cycloSPORINE (RESTASIS) 0.05 % ophthalmic emulsion Apply  to eye 2 (two) times a day.   Taking   • Multiple Vitamins-Minerals (ICAPS AREDS 2 PO) Take  by mouth Daily.   Taking      Scheduled Meds:    amLODIPine 10 mg Oral Daily   aspirin 81 mg Oral Daily   atorvastatin 80 mg Oral Daily   budesonide 0.5 mg Nebulization BID - RT   carBAMazepine 100 mg Oral BID   carvedilol 3.125 mg Oral Q12H   chlorthalidone 25 mg Oral Daily   cholecalciferol 1,000 Units Oral Daily   clopidogrel 75 mg Oral Daily   cycloSPORINE 1 drop Both Eyes BID   diazePAM 2 mg Oral Nightly   enoxaparin 1 mg/kg Subcutaneous Q12H   ferrous sulfate  "650 mg Oral Daily With Breakfast   fexofenadine 180 mg Oral Q12H   gabapentin 400 mg Oral 4x Daily   insulin detemir 12 Units Subcutaneous Q12H   insulin lispro 0-7 Units Subcutaneous 4x Daily With Meals & Nightly   insulin lispro 5 Units Subcutaneous TID With Meals   losartan 100 mg Oral Daily   montelukast 10 mg Oral Nightly   Morphine 30 mg Oral Q12H   OCUVITE-LUTEIN 1 tablet Oral Daily   oxybutynin XL 5 mg Oral Daily   pantoprazole 40 mg Oral Q AM   sodium chloride 3 mL Intravenous Q12H   tiZANidine 4 mg Oral Nightly   vitamin B-12 1,000 mcg Oral Daily   ascorbic acid 1,000 mg Oral Daily      Continuous Infusions:    nitroglycerin 10-50 mcg/min Last Rate: Stopped (11/10/18 1625)   sodium chloride 1-3 mL/kg/hr Last Rate: Stopped (11/10/18 1745)      PRN Meds:  •  acetaminophen  •  albuterol  •  dextrose  •  dextrose  •  glucagon (human recombinant)  •  hydrALAZINE  •  nitroglycerin  •  ondansetron **OR** ondansetron  •  sodium chloride and Allergies:  Isosorbide and Augmentin [amoxicillin-pot clavulanate]    Objective     Vital Sign Min/Max for last 24 hours  Temp  Min: 97.7 °F (36.5 °C)  Max: 98.9 °F (37.2 °C)   BP  Min: 125/50  Max: 179/88   Pulse  Min: 66  Max: 84   Resp  Min: 16  Max: 18   SpO2  Min: 95 %  Max: 96 %   No Data Recorded   No Data Recorded     Flowsheet Rows      First Filed Value   Admission Height  154.9 cm (60.98\") Documented at 11/12/2018 0456   Admission Weight  57.6 kg (127 lb) Documented at 11/09/2018 2300          Physical Exam:     General Appearance:    Alert, cooperative, in no acute distress   Head:    Normocephalic, without obvious abnormality, atraumatic   Eyes:            Lids and lashes normal, conjunctivae and sclerae normal, no   icterus, no pallor, corneas clear, PERRLA   Ears:    Ears appear intact with no abnormalities noted   Throat:   No oral lesions, no thrush, oral mucosa moist   Neck:   No adenopathy, supple, trachea midline, no thyromegaly, no   carotid bruit, no JVD "   Back:     No kyphosis present, no scoliosis present, no skin lesions,      erythema or scars, no tenderness to percussion or                   palpation,   range of motion normal   Lungs:     Clear to auscultation,respirations regular, even and                  unlabored    Heart:    Regular rhythm and normal rate, normal S1 and S2, no            murmur, no gallop, no rub, no click   Chest Wall:    No abnormalities observed   Abdomen:     Normal bowel sounds, no masses, no organomegaly, soft        non-tender, non-distended, no guarding, no rebound                tenderness   Rectal:     Deferred   Extremities:   Moves all extremities well, no edema, no cyanosis, no             redness   Pulses:   Pulses palpable and equal bilaterally   Skin:   No bleeding, bruising or rash   Lymph nodes:   No palpable adenopathy   Neurologic:   Cranial nerves 2 - 12 grossly intact, sensation intact, DTR       present and equal bilaterally             Assessment/Plan       NSTEMI (non-ST elevated myocardial infarction) (CMS/HCC)    Chest pain    Cerebrovascular disease    Coronary artery disease    Hypertension    Dyslipidemia    GERD (gastroesophageal reflux disease)    Diabetes mellitus (CMS/[facility])      I have obtained and reviewed her cardiac catheterization films.  She has approximately 80% left main stenosis, circumflex, LAD.  She is also very debilitated as has been documented in multiple notes.  After careful review of the medical record and examination of the patient I would concur with the nonoperative management of this patient.  Certainly a catheter-based intervention might be an option but I will leave that to cardiology and Dr. chairez.  At this point I will see back as needed.  I would like to thank you for this consultation.    I discussed the patients findings and my recommendations with patient      Please note that portions of this note were completed with a voice recognition program. Efforts were made to edit the  dictations, but words may be mistranscribed    Chavez Cornejo MD  18  7:05 AM                Electronically signed by Chavez Cornejo MD at 2018  7:07 AM          Physical Therapy Notes (most recent note)      Bernarda Montgomery, PT at 2018  3:46 PM  Version 1 of 1         Acute Care - Physical Therapy Initial Evaluation  [facility]     Patient Name: Fani Bird  : 1935  MRN: [id number]  Today's Date: 2018   Onset of Illness/Injury or Date of Surgery: 18  Date of Referral to PT: 18  Referring Physician: MD Bishop      Admit Date: 2018    Visit Dx:     ICD-10-CM ICD-9-CM   1. NSTEMI (non-ST elevated myocardial infarction) (CMS/HCC) I21.4 410.70   2. Impaired mobility and ADLs Z74.09 799.89   3. Impaired functional mobility, balance, gait, and endurance Z74.09 V49.89     Patient Active Problem List   Diagnosis   • Chest pain   • Cerebrovascular disease   • Coronary artery disease   • Hypertension   • Dyslipidemia   • GERD (gastroesophageal reflux disease)   • Vitamin D deficiency   • Diabetes mellitus (CMS/HCC)   • Dyspnea   • NSTEMI (non-ST elevated myocardial infarction) (CMS/HCC)     Past Medical History:   Diagnosis Date   • Cataract    • Cerebrovascular disease    • Chest pain    • Coronary artery disease     no recent ischemic evaluation   • Diabetes mellitus (CMS/HCC)     Hemoglobin A1c of 8.3.   • Dyslipidemia     May 2012 - Total 156, triglycerides 297, HDL 47, and LDL 54.   • Dyspnea    • GERD (gastroesophageal reflux disease)    • Hypertension    • Pneumonia    • Swelling of lower extremity     improved with stockings   • Vitamin D deficiency      Past Surgical History:   Procedure Laterality Date   • BLADDER REPAIR     • BREAST BIOPSY     • BREAST CYST ASPIRATION     • CARDIAC CATHETERIZATION     • CARPAL TUNNEL RELEASE Bilateral    • CATARACT EXTRACTION     • CHOLECYSTECTOMY     • CORONARY STENT PLACEMENT     • HYSTERECTOMY     • SPINAL  FUSION          PT ASSESSMENT (last 12 hours)      Physical Therapy Evaluation     Row Name 11/13/18 1437          PT Evaluation Time/Intention    Subjective Information  complains of;weakness  -TOY     Document Type  evaluation  -TOY     Mode of Treatment  physical therapy  -TOY     Patient Effort  good  -TOY     Symptoms Noted During/After Treatment  fatigue;shortness of breath  -TOY     Row Name 11/13/18 1437          General Information    Patient Profile Reviewed?  yes  -TOY     Onset of Illness/Injury or Date of Surgery  11/09/18  -TOY     Referring Physician  MD Bishop  -TOY     Patient Observations  alert;cooperative;agree to therapy  -TOY     Patient/Family Observations  no family present  -TOY     Prior Level of Function  independent:;gait;transfer;bed mobility;ADL's  -TOY     Equipment Currently Used at Home  shower chair;walker, rolling;raised toilet;grab bar  -TOY     Pertinent History of Current Functional Problem  patient had 3 day hx of chest pain she went to her local hosp and was dx with non STEMI and was transferred to [facility]  -TOY     Existing Precautions/Restrictions  cardiac  -TOY     Risks Reviewed  patient:;LOB;increased discomfort  -TOY     Benefits Reviewed  patient:;improve function;increase independence;increase strength;decrease risk of DVT  -TOY     Row Name 11/13/18 1437          Relationship/Environment    Lives With  child(jaime), adult  -TOY     Row Name 11/13/18 1437          Resource/Environmental Concerns    Current Living Arrangements  home/apartment/condo  -TOY     Resource/Environmental Concerns  none  -TOY     Row Name 11/13/18 1437          Cognitive Assessment/Intervention- PT/OT    Orientation Status (Cognition)  oriented x 4  -TOY     Follows Commands (Cognition)  WFL  -TOY     Safety Deficit (Cognitive)  safety precautions awareness;safety precautions follow-through/compliance  -TOY     Row Name 11/13/18 1437          Safety Issues, Functional Mobility    Safety Issues Affecting Function (Mobility)   safety precautions follow-through/compliance;safety precaution awareness  -TOY     Impairments Affecting Function (Mobility)  balance;endurance/activity tolerance;strength  -TOY     Row Name 11/13/18 1437          Bed Mobility Assessment/Treatment    Bed Mobility Assessment/Treatment  sit-supine  -TOY     Sit-Supine [place] (Bed Mobility)  contact guard  -TOY     Bed Mobility, Safety Issues  decreased use of legs for bridging/pushing  -TOY     Assistive Device (Bed Mobility)  bed rails;draw sheet  -TOY     Row Name 11/13/18 1437          Transfer Assessment/Treatment    Transfer Assessment/Treatment  sit-stand transfer;stand-sit transfer;toilet transfer  -TOY     Sit-Stand [place] (Transfers)  contact guard  -TOY     Stand-Sit [place] (Transfers)  contact guard  -TOY     [place]t Level (Toilet Transfer)  contact guard  -TOY     Assistive Device (Toilet Transfer)  commode  -TOY     Row Name 11/13/18 1437          Sit-Stand Transfer    Assistive Device (Sit-Stand Transfers)  walker, front-wheeled  -TOY     Row Name 11/13/18 1437          Stand-Sit Transfer    Assistive Device (Stand-Sit Transfers)  walker, front-wheeled  -TOY     Row Name 11/13/18 1437          Toilet Transfer    Type (Toilet Transfer)  sit-stand;stand-sit  -TOY     Row Name 11/13/18 1437          Gait/Stairs Assessment/Training    Gait/Stairs Assessment/Training  gait/ambulation independence;gait/ambulation assistive device  -TOY     [place]t Level (Gait)  contact guard  -TOY     Assistive Device (Gait)  walker, front-wheeled  -TOY     Distance in Feet (Gait)  150  -TOY     Pattern (Gait)  step-through  -TOY     Comment (Gait/Stairs)  patient needs cues for longer strides patient also takes extra time in turning  -TOY     Row Name 11/13/18 1437          General ROM    GENERAL ROM COMMENTS  no ROM deficits  -TOY     Row Name 11/13/18 1437          MMT (Manual Muscle Testing)    General MMT Comments  generalized weakness 4-/5  -TOY     Row Name  11/13/18 1437          Motor Assessment/Intervention    Additional Documentation  Balance (Group);Therapeutic Exercise (Group);Therapeutic Exercise Interventions (Group)  -[facility] Name 11/13/18 1437          Therapeutic Exercise    Therapeutic Exercise  seated, lower extremities;seated, upper extremities  -     Additional Documentation  Therapeutic Exercise ([facility])  -[facility] Name 11/13/18 1437          Balance    Balance  static sitting balance;static standing balance  -[facility] Name 11/13/18 1437          Static Sitting Balance    Level of [place] (Unsupported Sitting, Static Balance)  contact guard assist  -     Sitting Position (Unsupported Sitting, Static Balance)  sitting on edge of bed  -     Time Able to Maintain Position (Unsupported Sitting, Static Balance)  more than 5 minutes  -[facility] Name 11/13/18 1437          Static Standing Balance    Level of [place] (Supported Standing, Static Balance)  contact guard assist  -TOY     Time Able to Maintain Position (Supported Standing, Static Balance)  1 to 2 minutes  -     Assistive Device Utilized (Supported Standing, Static Balance)  rolling walker  -[facility] Name 11/13/18 1437          Pain Assessment    Additional Documentation  Pain Scale: Numbers Pre/Post-Treatment (Group)  -[facility] Name 11/13/18 1437          Pain Scale: Numbers Pre/Post-Treatment    Pain Scale: Numbers, Pretreatment  2/10  -     Pain Scale: Numbers, Post-Treatment  4/10  -     Pain Location - Side  Right  -     Pain Location  shoulder  -     Pain Intervention(s)  Heat applied;Repositioned;Ambulation/increased activity  -[facility] Name 11/13/18 1437          Coping    Observed Emotional State  calm;cooperative  -     Verbalized Emotional State  acceptance  -[facility] Name 11/13/18 1437          Plan of Care Review    Plan of Care Reviewed With  patient  -[facility] Name 11/13/18 1437          Physical Therapy Clinical Impression    Date of Referral  to PT  11/13/18  -TOY     PT Diagnosis (PT Clinical Impression)  impaired bed mobility transfer and gait  -TOY     Patient/Family Goals Statement (PT Clinical Impression)  possible short rehab stay  -TOY     Criteria for Skilled Interventions Met (PT Clinical Impression)  yes;treatment indicated  -TOY     Rehab Potential (PT Clinical Summary)  good, to achieve stated therapy goals  -TOY     Care Plan Review (PT)  evaluation/treatment results reviewed;care plan/treatment goals reviewed;risks/benefits reviewed;patient/other agree to care plan  -     Row Name 11/13/18 1437          Physical Therapy Goals    Bed Mobility Goal Selection (PT)  bed mobility, PT goal 1  -TOY     Transfer Goal Selection (PT)  transfer, PT goal 1  -TOY     Gait Training Goal Selection (PT)  gait training, PT goal 1  -     Row Name 11/13/18 1437          Bed Mobility Goal 1 (PT)    Activity/Assistive Device (Bed Mobility Goal 1, PT)  sit to supine/supine to sit  -TOY     [place] Level/Cues Needed (Bed Mobility Goal 1, PT)  independent  -TOY     Time Frame (Bed Mobility Goal 1, PT)  long term goal (LTG);10 days  -TOY     Progress/Outcomes (Bed Mobility Goal 1, PT)  goal ongoing  -     Row Name 11/13/18 1437          Transfer Goal 1 (PT)    Activity/Assistive Device (Transfer Goal 1, PT)  sit-to-stand/stand-to-sit  -TOY     [place] Level/Cues Needed (Transfer Goal 1, PT)  independent  -TOY     Time Frame (Transfer Goal 1, PT)  long term goal (LTG);10 days  -TOY     Progress/Outcome (Transfer Goal 1, PT)  goal ongoing  -     Row Name 11/13/18 1437          Gait Training Goal 1 (PT)    Activity/Assistive Device (Gait Training Goal 1, PT)  gait (walking locomotion);assistive device use;walker, rolling  -TOY     [place] Level (Gait Training Goal 1, PT)  independent  -TOY     Distance (Gait Goal 1, PT)  400  -TOY     Time Frame (Gait Training Goal 1, PT)  long term goal (LTG);10 days  -TOY     Progress/Outcome (Gait Training Goal 1, PT)  goal  ongoing  -TOY     Row Name 11/13/18 1437          Patient Education Goal (PT)    Activity (Patient Education Goal, PT)  HEP  -OTY     [place]/Cues/Accuracy (Memory Goal 2, PT)  verbalizes understanding  -TOY     Time Frame (Patient Education Goal, PT)  long term goal (LTG);10 days  -TOY     Progress/Outcome (Patient Education Goal, PT)  goal ongoing  -TOY     Row Name 11/13/18 1437          Positioning and Restraints    Pre-Treatment Position  sitting in chair/recliner  -TOY     Post Treatment Position  bed  -TOY     In Bed  notified nsg;supine;call light within reach  -TOY       User Key  (r) = Recorded By, (t) = Taken By, (c) = Cosigned By    Initials Name Provider Type    Bernarda Wallis PT Physical Therapist        Physical Therapy Education     Title: PT OT SLP Therapies (Active)     Topic: Physical Therapy (Active)     Point: Mobility training (Active)     Learning Progress Summary           Patient Acceptance, E, NR by TOY at 11/13/2018  2:37 PM                   Point: Home exercise program (Active)     Learning Progress Summary           Patient Acceptance, E, NR by TOY at 11/13/2018  2:37 PM                   Point: Body mechanics (Active)     Learning Progress Summary           Patient Acceptance, E, NR by TOY at 11/13/2018  2:37 PM                   Point: Precautions (Active)     Learning Progress Summary           Patient Acceptance, E, NR by TOY at 11/13/2018  2:37 PM                               User Key     Initials Effective Dates Name Provider Type Nannette YEAGER 06/19/15 -  Bernarda Montgomery PT Physical Therapist PT              PT Recommendation and Plan  Anticipated Discharge Disposition (PT): inpatient rehabilitation facility  Planned Therapy Interventions (PT Eval): balance training, bed mobility training, gait training, home exercise program, transfer training, strengthening  Therapy Frequency (PT Clinical Impression): daily  Outcome Summary/Treatment Plan (PT)  Anticipated Discharge  Disposition (PT): inpatient rehabilitation facility  Plan of Care Reviewed With: patient  Outcome Summary: PT eval completed patient is able to ambulate 150 ft with walker with CGA. patient fatigues easily and would benefit from IPR prior t oreturning home  Outcome Measures     Row Name 11/13/18 1437 11/13/18 1425          How much help from another person do you currently need...    Turning from your back to your side while in flat bed without using bedrails?  4  -TOY  --     Moving from lying on back to sitting on the side of a flat bed without bedrails?  3  -TOY  --     Moving to and from a bed to a chair (including a wheelchair)?  3  -TOY  --     Standing up from a chair using your arms (e.g., wheelchair, bedside chair)?  3  -TOY  --     Climbing 3-5 steps with a railing?  2  -TOY  --     To walk in hospital room?  3  -TOY  --     AM-PAC 6 Clicks Score  18  -TOY  --        How much help from another is currently needed...    Putting on and taking off regular lower body clothing?  --  3  -HK     Bathing (including washing, rinsing, and drying)  --  3  -HK     Toileting (which includes using toilet bed pan or urinal)  --  3  -HK     Putting on and taking off regular upper body clothing  --  4  -HK     Taking care of personal grooming (such as brushing teeth)  --  3  -HK     Eating meals  --  4  -HK     Score  --  20  -HK        Functional Assessment    Outcome Measure Options  AM-PAC 6 Clicks Basic Mobility (PT)  -TOY  AM-PAC 6 Clicks Daily Activity (OT)  -HK       User Key  (r) = Recorded By, (t) = Taken By, (c) = Cosigned By    Initials Name Provider Type    Bernarda Wallis, PT Physical Therapist    HK Iman Feliciano, OT Occupational Therapist         Time Calculation:   PT Charges     Row Name 11/13/18 8430             Time Calculation    Start Time  1437  -TOY      PT Received On  11/13/18  -TOY      PT Goal Re-Cert Due Date  11/23/18  -TOY        User Key  (r) = Recorded By, (t) = Taken By, (c) = Cosigned By     Initials Name Provider Type    Bernarda Wallis PT Physical Therapist        Therapy Suggested Charges     Code   Minutes Charges    None           Therapy Charges for Today     Code Description Service Date Service Provider Modifiers Qty    46673926113 HC PT EVAL MOD COMPLEXITY 4 2018 Bernarda Montgomery PT GP 1          PT G-Codes  Outcome Measure Options: AM-PAC 6 Clicks Basic Mobility (PT)  AM-PAC 6 Clicks Score: 18  Score: 20      Bernarda Montgomery PT  2018         Electronically signed by Bernarda Montgomery PT at 2018  3:46 PM       [address] [address]  Phone:  [phone]  Fax:   Date: 2018      Ambulatory Referral to Home Health     Patient:  Fani Bird MRN:  [id number]   [address]  [facility] [place] [address] :  1935  SSN:    Phone: [phone] Sex:  F      INSURANCE PAYOR PLAN GROUP # SUBSCRIBER ID   Primary:    [employer] MEDICARE REPLACEMENT [id number]      Referring Provider Information:  ANTHONY MONROY Phone: [phone] Fax:       Referral Information:   # Visits:  1 Referral Type: Home Health [42]   Urgency:  Routine Referral Reason: Specialty Services Required   Start Date: 2018 End Date:  To be determined by Insurer   Diagnosis: NSTEMI (non-ST elevated myocardial infarction) (CMS/[facility]) (I21.4 [ICD-10-CM] 410.70 [ICD-9-CM])  Impaired functional mobility, balance, gait, and endurance (Z74.09 [ICD-10-CM] V49.89 [ICD-9-CM])      Refer to Dept:   Refer to Provider:   Refer to Facility:       Face to Face Visit Date: 2018  Follow-up Provider for Plan of Care? I treated the patient in an acute care facility and will not continue treatment after discharge.  Follow-up Provider: DELMY LEMON [[id number]]  Reason/Clinical Findings: NSTEMI; weakness  Describe mobility limitations that make leaving home difficult: NSTEMI; weakness  Nursing/Therapeutic Services Requested:  Skilled Nursing  Nursing/Therapeutic Services Requested: Physical Therapy  Nursing/Therapeutic Services Requested: Occupational Therapy  Skilled nursing orders: Medication education  Skilled nursing orders: Cardiopulmonary assessments  Skilled nursing orders: Neurovascular assessments  Skilled nursing orders: Pain management  PT orders: Other (eval & treat)  Occupational orders: Other (eval & treat)     This document serves as a request of services and does not constitute Insurance authorization or approval of services.  To determine eligibility, please contact the members Insurance carrier to verify and review coverage.     If you have medical questions regarding this request for services. Please contact [address] at [phone] between the hours of 8:00am - 5:00pm (Mon-Fri).       Verbal Order Mode: Telephone with readback   Authorizing Provider: Júnior Bishop MD  Authorizing Provider's NPI: [id number]     Order Entered By: Alfreda Perez RN 11/14/2018 12:50 PM     Electronically signed by:  Júnior Bishop MD 11/14/2018 12:54 PM

## 2018-11-14 NOTE — PLAN OF CARE
Problem: Patient Care Overview  Goal: Plan of Care Review   11/14/18 0588   Coping/Psychosocial   Plan of Care Reviewed With patient;daughter   Plan of Care Review   Progress improving   OTHER   Outcome Summary Pt. denying any CP or SOB; ambulating safely to the restroom with her walker; VSS. NSR; room air in the evening and 1L for comfort during the night; continue to monitor.

## 2018-11-14 NOTE — PROGRESS NOTES
Case Management Discharge Note    Final Note: Planning to discharge home with daughter Yajaira today. Will resume home health services with [facility] (PT/OT/SN). Denies other discharge planning needs or concerns. Alfreda Perez, RN [phone]    Destination      No service has been selected for the patient.      Durable Medical Equipment      No service has been selected for the patient.      Dialysis/Infusion      No service has been selected for the patient.      Home Medical Care - Selection Complete      Service Provider Request Status Selected Services Address Phone Number Fax Number    [facility] Selected Home Health Services [facility] [address],  [place] [address] [phone]      Community Resources      No service has been selected for the patient.             Final Discharge Disposition Code: 06 - home with home health care

## 2018-11-14 NOTE — NURSING NOTE
Pt. Referred for Phase II Cardiac Rehab. Staff discussed benefits of exercise, program protocol, and educational material provided. Teach back verified.  Permission granted from patient for staff to fax referral information to outlying program at this time.  Staff to fax referral info to [facility].

## 2018-11-15 ENCOUNTER — READMISSION MANAGEMENT (OUTPATIENT)
Dept: CALL CENTER | Facility: HOSPITAL | Age: 83
End: 2018-11-15

## 2018-11-16 NOTE — OUTREACH NOTE
Prep Survey      Responses   Facility patient discharged from?  [place]   Is patient eligible?  Yes   Does the patient have one of the following disease processes/diagnoses(primary or secondary)?  Acute MI (STEMI,NSTEMI)   Does the patient have Home health ordered?  Yes   What is the Home health agency?   [facility] skilled nsg,OT and PT   Is there a DME ordered?  No   Prep survey completed?  Yes          Darlin Rose RN

## 2018-11-19 ENCOUNTER — READMISSION MANAGEMENT (OUTPATIENT)
Dept: CALL CENTER | Facility: HOSPITAL | Age: 83
End: 2018-11-19

## 2018-11-19 ENCOUNTER — DOCUMENTATION (OUTPATIENT)
Dept: CARDIAC REHAB | Facility: HOSPITAL | Age: 83
End: 2018-11-19

## 2018-11-19 NOTE — OUTREACH NOTE
AMI Week 1 Survey      Responses   Facility patient discharged from?  [place]   Does the patient have one of the following disease processes/diagnoses(primary or secondary)?  Acute MI (STEMI,NSTEMI)   Is there a successful TCM telephone encounter documented?  No   Week 1 attempt successful?  Yes   Call start time  1033   Call end time  1045   Is patient permission given to speak with other caregiver?  Yes   List who call center can speak with  Yajaira, daughter   Person spoke with today (if not patient) and relationship  Yajaira, daughter   Meds reviewed with patient/caregiver?  Yes   Is the patient having any side effects they believe may be caused by any medication additions or changes?  No   Does the patient have all prescriptions related to this admission filled (includes statins,anticoagulants,HTN meds,anti-arrhythmia meds)  Yes   Is the patient taking all medications as directed (includes completed medication regime)?  Yes   What is the Home health agency?   [facility] skilled nsg,OT and PT   Has home health visited the patient within 72 hours of discharge?  Yes   Psychosocial issues?  No   Did the patient receive a copy of their discharge instructions?  Yes   Nursing interventions  Reviewed instructions with patient [Reviewed with daughter]   What is the patient's perception of their health status since discharge?  Improving   Nursing interventions  Nurse provided patient education   Is the patient/caregiver able to teach back signs and symptoms of when to call for help immediately:  Sudden chest discomfort, Sudden discomfort in arms, back, neck or jaw, Shortness of breath at any time, Sudden sweating or clammy skin, Nausea or vomiting, Dizziness or lightheadedness, Irregular or rapid heart rate   Nursing interventions  Nurse provided patient education   Is the patient/caregiver able to teach back lifestyle changes to help prevent MIs  Heart healthy diet, Maintaining a healthy weight, Reducing stress    Is the patient/caregiver able to teach back ways to prevent a second heart attack:  Take medications, Follow up with MD, Manage risk factors   Is the patient/caregiver able to teach back the hierarchy of who to call/visit for symptoms/problems? PCP, Specialist, Home health nurse, Urgent Care, ED, 911  Yes   Week 1 call completed?  Yes   Wrap up additional comments  Daughter states patient also has [employer] nurse along with HH. States that [employer] nurse came to the home on Saturday 11/17/18.           Chasity Daily RN

## 2018-11-21 ENCOUNTER — HOSPITAL ENCOUNTER (INPATIENT)
Facility: HOSPITAL | Age: 83
LOS: 8 days | Discharge: HOME HEALTH | End: 2018-11-29
Attending: INTERNAL MEDICINE | Admitting: INTERNAL MEDICINE

## 2018-11-21 DIAGNOSIS — Z74.09 IMPAIRED FUNCTIONAL MOBILITY, BALANCE, GAIT, AND ENDURANCE: ICD-10-CM

## 2018-11-21 DIAGNOSIS — I25.10 CORONARY ARTERY DISEASE INVOLVING NATIVE CORONARY ARTERY OF NATIVE HEART WITHOUT ANGINA PECTORIS: ICD-10-CM

## 2018-11-21 DIAGNOSIS — R13.12 OROPHARYNGEAL DYSPHAGIA: ICD-10-CM

## 2018-11-21 DIAGNOSIS — I20.0 UNSTABLE ANGINA (HCC): Primary | ICD-10-CM

## 2018-11-21 DIAGNOSIS — I67.9 CEREBROVASCULAR DISEASE: ICD-10-CM

## 2018-11-21 PROCEDURE — 85730 THROMBOPLASTIN TIME PARTIAL: CPT | Performed by: INTERNAL MEDICINE

## 2018-11-21 PROCEDURE — 85610 PROTHROMBIN TIME: CPT | Performed by: INTERNAL MEDICINE

## 2018-11-21 PROCEDURE — 84484 ASSAY OF TROPONIN QUANT: CPT | Performed by: INTERNAL MEDICINE

## 2018-11-21 PROCEDURE — 85025 COMPLETE CBC W/AUTO DIFF WBC: CPT | Performed by: INTERNAL MEDICINE

## 2018-11-21 PROCEDURE — 99223 1ST HOSP IP/OBS HIGH 75: CPT | Performed by: INTERNAL MEDICINE

## 2018-11-21 PROCEDURE — 93005 ELECTROCARDIOGRAM TRACING: CPT | Performed by: INTERNAL MEDICINE

## 2018-11-21 PROCEDURE — 93010 ELECTROCARDIOGRAM REPORT: CPT | Performed by: INTERNAL MEDICINE

## 2018-11-21 RX ORDER — MORPHINE SULFATE 2 MG/ML
1 INJECTION, SOLUTION INTRAMUSCULAR; INTRAVENOUS EVERY 4 HOURS PRN
Status: DISCONTINUED | OUTPATIENT
Start: 2018-11-21 | End: 2018-11-29 | Stop reason: HOSPADM

## 2018-11-21 RX ORDER — PANTOPRAZOLE SODIUM 40 MG/1
40 TABLET, DELAYED RELEASE ORAL
Status: DISCONTINUED | OUTPATIENT
Start: 2018-11-22 | End: 2018-11-29 | Stop reason: HOSPADM

## 2018-11-21 RX ORDER — CYCLOSPORINE 0.5 MG/ML
1 EMULSION OPHTHALMIC 2 TIMES DAILY
Status: DISCONTINUED | OUTPATIENT
Start: 2018-11-22 | End: 2018-11-29 | Stop reason: HOSPADM

## 2018-11-21 RX ORDER — TIZANIDINE 4 MG/1
4 TABLET ORAL NIGHTLY
Status: DISCONTINUED | OUTPATIENT
Start: 2018-11-22 | End: 2018-11-29 | Stop reason: HOSPADM

## 2018-11-21 RX ORDER — CARVEDILOL 3.12 MG/1
3.12 TABLET ORAL EVERY 12 HOURS SCHEDULED
Status: DISCONTINUED | OUTPATIENT
Start: 2018-11-22 | End: 2018-11-29 | Stop reason: HOSPADM

## 2018-11-21 RX ORDER — SODIUM CHLORIDE 0.9 % (FLUSH) 0.9 %
3-10 SYRINGE (ML) INJECTION AS NEEDED
Status: DISCONTINUED | OUTPATIENT
Start: 2018-11-21 | End: 2018-11-29 | Stop reason: HOSPADM

## 2018-11-21 RX ORDER — ALBUTEROL SULFATE 2.5 MG/3ML
2.5 SOLUTION RESPIRATORY (INHALATION) EVERY 6 HOURS PRN
Status: DISCONTINUED | OUTPATIENT
Start: 2018-11-21 | End: 2018-11-29 | Stop reason: HOSPADM

## 2018-11-21 RX ORDER — CLOPIDOGREL BISULFATE 75 MG/1
75 TABLET ORAL DAILY
Status: DISCONTINUED | OUTPATIENT
Start: 2018-11-22 | End: 2018-11-29 | Stop reason: HOSPADM

## 2018-11-21 RX ORDER — MORPHINE SULFATE 30 MG/1
30 TABLET, FILM COATED, EXTENDED RELEASE ORAL EVERY 12 HOURS SCHEDULED
Status: DISCONTINUED | OUTPATIENT
Start: 2018-11-22 | End: 2018-11-29 | Stop reason: HOSPADM

## 2018-11-21 RX ORDER — NITROFURANTOIN MACROCRYSTALS 50 MG/1
50 CAPSULE ORAL NIGHTLY
Status: COMPLETED | OUTPATIENT
Start: 2018-11-22 | End: 2018-11-28

## 2018-11-21 RX ORDER — DIAZEPAM 2 MG/1
2 TABLET ORAL NIGHTLY
Status: DISCONTINUED | OUTPATIENT
Start: 2018-11-22 | End: 2018-11-29 | Stop reason: HOSPADM

## 2018-11-21 RX ORDER — MONTELUKAST SODIUM 10 MG/1
10 TABLET ORAL NIGHTLY
Status: DISCONTINUED | OUTPATIENT
Start: 2018-11-22 | End: 2018-11-29 | Stop reason: HOSPADM

## 2018-11-21 RX ORDER — ATORVASTATIN CALCIUM 40 MG/1
80 TABLET, FILM COATED ORAL NIGHTLY
Status: DISCONTINUED | OUTPATIENT
Start: 2018-11-22 | End: 2018-11-29 | Stop reason: HOSPADM

## 2018-11-21 RX ORDER — NALOXONE HCL 0.4 MG/ML
0.4 VIAL (ML) INJECTION
Status: DISCONTINUED | OUTPATIENT
Start: 2018-11-21 | End: 2018-11-29 | Stop reason: HOSPADM

## 2018-11-21 RX ORDER — NITROGLYCERIN 0.4 MG/1
0.4 TABLET SUBLINGUAL
Status: DISCONTINUED | OUTPATIENT
Start: 2018-11-21 | End: 2018-11-29 | Stop reason: HOSPADM

## 2018-11-21 RX ORDER — RAMIPRIL 2.5 MG/1
2.5 CAPSULE ORAL DAILY
Status: DISCONTINUED | OUTPATIENT
Start: 2018-11-22 | End: 2018-11-21

## 2018-11-21 RX ORDER — GABAPENTIN 400 MG/1
400 CAPSULE ORAL EVERY 12 HOURS SCHEDULED
Status: DISCONTINUED | OUTPATIENT
Start: 2018-11-22 | End: 2018-11-29 | Stop reason: HOSPADM

## 2018-11-21 RX ORDER — SODIUM CHLORIDE 0.9 % (FLUSH) 0.9 %
3 SYRINGE (ML) INJECTION EVERY 12 HOURS SCHEDULED
Status: DISCONTINUED | OUTPATIENT
Start: 2018-11-22 | End: 2018-11-29 | Stop reason: HOSPADM

## 2018-11-21 RX ORDER — MORPHINE SULFATE 30 MG/1
15 TABLET ORAL 2 TIMES DAILY PRN
Status: DISCONTINUED | OUTPATIENT
Start: 2018-11-21 | End: 2018-11-29 | Stop reason: HOSPADM

## 2018-11-21 RX ORDER — OXYBUTYNIN CHLORIDE 5 MG/1
10 TABLET, EXTENDED RELEASE ORAL DAILY
Status: DISCONTINUED | OUTPATIENT
Start: 2018-11-22 | End: 2018-11-29 | Stop reason: HOSPADM

## 2018-11-21 RX ORDER — LOSARTAN POTASSIUM 50 MG/1
100 TABLET ORAL DAILY
Status: DISCONTINUED | OUTPATIENT
Start: 2018-11-22 | End: 2018-11-24

## 2018-11-21 RX ORDER — ASPIRIN 81 MG/1
81 TABLET ORAL DAILY
Status: DISCONTINUED | OUTPATIENT
Start: 2018-11-21 | End: 2018-11-29 | Stop reason: HOSPADM

## 2018-11-21 RX ORDER — CARBAMAZEPINE 200 MG/1
50 TABLET ORAL 2 TIMES DAILY
Status: DISCONTINUED | OUTPATIENT
Start: 2018-11-22 | End: 2018-11-29 | Stop reason: HOSPADM

## 2018-11-21 RX ORDER — DOCUSATE SODIUM 100 MG/1
100 CAPSULE, LIQUID FILLED ORAL 2 TIMES DAILY
Status: DISCONTINUED | OUTPATIENT
Start: 2018-11-22 | End: 2018-11-29 | Stop reason: HOSPADM

## 2018-11-22 ENCOUNTER — READMISSION MANAGEMENT (OUTPATIENT)
Dept: CALL CENTER | Facility: HOSPITAL | Age: 83
End: 2018-11-22

## 2018-11-22 LAB
ANION GAP SERPL CALCULATED.3IONS-SCNC: 6 MMOL/L (ref 3–11)
APTT PPP: 28.2 SECONDS (ref 55–70)
BASOPHILS # BLD AUTO: 0.03 10*3/MM3 (ref 0–0.2)
BASOPHILS NFR BLD AUTO: 0.3 % (ref 0–1)
BUN BLD-MCNC: 10 MG/DL (ref 9–23)
BUN/CREAT SERPL: 16.7 (ref 7–25)
CALCIUM SPEC-SCNC: 9.4 MG/DL (ref 8.7–10.4)
CHLORIDE SERPL-SCNC: 99 MMOL/L (ref 99–109)
CO2 SERPL-SCNC: 30 MMOL/L (ref 20–31)
CREAT BLD-MCNC: 0.6 MG/DL (ref 0.6–1.3)
DEPRECATED RDW RBC AUTO: 45.1 FL (ref 37–54)
EOSINOPHIL # BLD AUTO: 0.14 10*3/MM3 (ref 0–0.3)
EOSINOPHIL NFR BLD AUTO: 1.5 % (ref 0–3)
ERYTHROCYTE [DISTWIDTH] IN BLOOD BY AUTOMATED COUNT: 13.3 % (ref 11.3–14.5)
GFR SERPL CREATININE-BSD FRML MDRD: 96 ML/MIN/1.73
GLUCOSE BLD-MCNC: 88 MG/DL (ref 70–100)
GLUCOSE BLDC GLUCOMTR-MCNC: 136 MG/DL (ref 70–130)
GLUCOSE BLDC GLUCOMTR-MCNC: 266 MG/DL (ref 70–130)
GLUCOSE BLDC GLUCOMTR-MCNC: 290 MG/DL (ref 70–130)
GLUCOSE BLDC GLUCOMTR-MCNC: 80 MG/DL (ref 70–130)
GLUCOSE BLDC GLUCOMTR-MCNC: 89 MG/DL (ref 70–130)
HCT VFR BLD AUTO: 33.8 % (ref 34.5–44)
HGB BLD-MCNC: 10.7 G/DL (ref 11.5–15.5)
IMM GRANULOCYTES # BLD: 0.05 10*3/MM3 (ref 0–0.03)
IMM GRANULOCYTES NFR BLD: 0.5 % (ref 0–0.6)
INR PPP: 1.03 (ref 0.91–1.09)
LYMPHOCYTES # BLD AUTO: 2.15 10*3/MM3 (ref 0.6–4.8)
LYMPHOCYTES NFR BLD AUTO: 22.3 % (ref 24–44)
MCH RBC QN AUTO: 29.8 PG (ref 27–31)
MCHC RBC AUTO-ENTMCNC: 31.7 G/DL (ref 32–36)
MCV RBC AUTO: 94.2 FL (ref 80–99)
MONOCYTES # BLD AUTO: 0.5 10*3/MM3 (ref 0–1)
MONOCYTES NFR BLD AUTO: 5.2 % (ref 0–12)
NEUTROPHILS # BLD AUTO: 6.81 10*3/MM3 (ref 1.5–8.3)
NEUTROPHILS NFR BLD AUTO: 70.7 % (ref 41–71)
PLATELET # BLD AUTO: 276 10*3/MM3 (ref 150–450)
PMV BLD AUTO: 11.3 FL (ref 6–12)
POTASSIUM BLD-SCNC: 3.8 MMOL/L (ref 3.5–5.5)
PROTHROMBIN TIME: 10.8 SECONDS (ref 9.6–11.5)
RBC # BLD AUTO: 3.59 10*6/MM3 (ref 3.89–5.14)
SODIUM BLD-SCNC: 135 MMOL/L (ref 132–146)
TROPONIN I SERPL-MCNC: 0.79 NG/ML
TROPONIN I SERPL-MCNC: 0.86 NG/ML
WBC NRBC COR # BLD: 9.63 10*3/MM3 (ref 3.5–10.8)

## 2018-11-22 PROCEDURE — 80048 BASIC METABOLIC PNL TOTAL CA: CPT | Performed by: INTERNAL MEDICINE

## 2018-11-22 PROCEDURE — 94799 UNLISTED PULMONARY SVC/PX: CPT

## 2018-11-22 PROCEDURE — 99222 1ST HOSP IP/OBS MODERATE 55: CPT | Performed by: INTERNAL MEDICINE

## 2018-11-22 PROCEDURE — 93010 ELECTROCARDIOGRAM REPORT: CPT | Performed by: INTERNAL MEDICINE

## 2018-11-22 PROCEDURE — 25010000002 ONDANSETRON PER 1 MG: Performed by: INTERNAL MEDICINE

## 2018-11-22 PROCEDURE — 84484 ASSAY OF TROPONIN QUANT: CPT | Performed by: INTERNAL MEDICINE

## 2018-11-22 PROCEDURE — 63710000001 INSULIN DETEMIR PER 5 UNITS: Performed by: INTERNAL MEDICINE

## 2018-11-22 PROCEDURE — 25010000002 ENOXAPARIN PER 10 MG: Performed by: INTERNAL MEDICINE

## 2018-11-22 PROCEDURE — 82962 GLUCOSE BLOOD TEST: CPT

## 2018-11-22 PROCEDURE — 93005 ELECTROCARDIOGRAM TRACING: CPT | Performed by: INTERNAL MEDICINE

## 2018-11-22 PROCEDURE — G0378 HOSPITAL OBSERVATION PER HR: HCPCS

## 2018-11-22 PROCEDURE — 63710000001 INSULIN LISPRO (HUMAN) PER 5 UNITS: Performed by: INTERNAL MEDICINE

## 2018-11-22 PROCEDURE — 99233 SBSQ HOSP IP/OBS HIGH 50: CPT | Performed by: INTERNAL MEDICINE

## 2018-11-22 RX ORDER — CALCIUM CARBONATE 200(500)MG
1 TABLET,CHEWABLE ORAL 3 TIMES DAILY PRN
Status: DISCONTINUED | OUTPATIENT
Start: 2018-11-22 | End: 2018-11-29 | Stop reason: HOSPADM

## 2018-11-22 RX ORDER — RANOLAZINE 500 MG/1
500 TABLET, EXTENDED RELEASE ORAL EVERY 12 HOURS SCHEDULED
Status: DISCONTINUED | OUTPATIENT
Start: 2018-11-22 | End: 2018-11-29 | Stop reason: HOSPADM

## 2018-11-22 RX ORDER — ONDANSETRON 2 MG/ML
4 INJECTION INTRAMUSCULAR; INTRAVENOUS EVERY 6 HOURS PRN
Status: DISCONTINUED | OUTPATIENT
Start: 2018-11-22 | End: 2018-11-29 | Stop reason: HOSPADM

## 2018-11-22 RX ORDER — DEXTROSE MONOHYDRATE 25 G/50ML
25 INJECTION, SOLUTION INTRAVENOUS
Status: DISCONTINUED | OUTPATIENT
Start: 2018-11-22 | End: 2018-11-29 | Stop reason: HOSPADM

## 2018-11-22 RX ORDER — MORPHINE SULFATE 30 MG/1
30 TABLET, FILM COATED, EXTENDED RELEASE ORAL ONCE
Status: COMPLETED | OUTPATIENT
Start: 2018-11-22 | End: 2018-11-22

## 2018-11-22 RX ORDER — NICOTINE POLACRILEX 4 MG
15 LOZENGE BUCCAL
Status: DISCONTINUED | OUTPATIENT
Start: 2018-11-22 | End: 2018-11-29 | Stop reason: HOSPADM

## 2018-11-22 RX ORDER — ISOSORBIDE MONONITRATE 30 MG/1
30 TABLET, EXTENDED RELEASE ORAL
Status: DISCONTINUED | OUTPATIENT
Start: 2018-11-22 | End: 2018-11-23

## 2018-11-22 RX ADMIN — CARVEDILOL 3.12 MG: 3.12 TABLET, FILM COATED ORAL at 01:26

## 2018-11-22 RX ADMIN — PANTOPRAZOLE SODIUM 40 MG: 40 TABLET, DELAYED RELEASE ORAL at 17:04

## 2018-11-22 RX ADMIN — MORPHINE SULFATE 30 MG: 30 TABLET, EXTENDED RELEASE ORAL at 09:41

## 2018-11-22 RX ADMIN — SODIUM CHLORIDE, PRESERVATIVE FREE 3 ML: 5 INJECTION INTRAVENOUS at 21:13

## 2018-11-22 RX ADMIN — MORPHINE SULFATE 30 MG: 30 TABLET, EXTENDED RELEASE ORAL at 21:03

## 2018-11-22 RX ADMIN — CARVEDILOL 3.12 MG: 3.12 TABLET, FILM COATED ORAL at 09:43

## 2018-11-22 RX ADMIN — DIAZEPAM 2 MG: 2 TABLET ORAL at 01:27

## 2018-11-22 RX ADMIN — DIAZEPAM 2 MG: 2 TABLET ORAL at 21:03

## 2018-11-22 RX ADMIN — PANTOPRAZOLE SODIUM 40 MG: 40 TABLET, DELAYED RELEASE ORAL at 09:42

## 2018-11-22 RX ADMIN — CYCLOSPORINE 1 DROP: 0.5 EMULSION OPHTHALMIC at 21:37

## 2018-11-22 RX ADMIN — MONTELUKAST SODIUM 10 MG: 10 TABLET, COATED ORAL at 21:02

## 2018-11-22 RX ADMIN — CARBAMAZEPINE 50 MG: 200 TABLET ORAL at 09:43

## 2018-11-22 RX ADMIN — CLOPIDOGREL 75 MG: 75 TABLET, FILM COATED ORAL at 09:42

## 2018-11-22 RX ADMIN — DOCUSATE SODIUM 100 MG: 100 CAPSULE, LIQUID FILLED ORAL at 09:42

## 2018-11-22 RX ADMIN — MONTELUKAST SODIUM 10 MG: 10 TABLET, COATED ORAL at 01:26

## 2018-11-22 RX ADMIN — ALBUTEROL SULFATE 2.5 MG: 2.5 SOLUTION RESPIRATORY (INHALATION) at 18:44

## 2018-11-22 RX ADMIN — GABAPENTIN 400 MG: 400 CAPSULE ORAL at 01:26

## 2018-11-22 RX ADMIN — TIZANIDINE 4 MG: 4 TABLET ORAL at 01:27

## 2018-11-22 RX ADMIN — ATORVASTATIN CALCIUM 80 MG: 40 TABLET, FILM COATED ORAL at 01:26

## 2018-11-22 RX ADMIN — ENOXAPARIN SODIUM 60 MG: 60 INJECTION SUBCUTANEOUS at 01:59

## 2018-11-22 RX ADMIN — LOSARTAN POTASSIUM 100 MG: 50 TABLET ORAL at 09:42

## 2018-11-22 RX ADMIN — INSULIN LISPRO 4 UNITS: 100 INJECTION, SOLUTION INTRAVENOUS; SUBCUTANEOUS at 21:03

## 2018-11-22 RX ADMIN — GABAPENTIN 400 MG: 400 CAPSULE ORAL at 09:42

## 2018-11-22 RX ADMIN — INSULIN DETEMIR 10 UNITS: 100 INJECTION, SOLUTION SUBCUTANEOUS at 21:02

## 2018-11-22 RX ADMIN — RANOLAZINE 500 MG: 500 TABLET, FILM COATED, EXTENDED RELEASE ORAL at 12:25

## 2018-11-22 RX ADMIN — CARBAMAZEPINE 50 MG: 200 TABLET ORAL at 01:56

## 2018-11-22 RX ADMIN — CYCLOSPORINE 1 DROP: 0.5 EMULSION OPHTHALMIC at 01:26

## 2018-11-22 RX ADMIN — NITROFURANTOIN (MACROCRYSTALS) 50 MG: 50 CAPSULE ORAL at 21:37

## 2018-11-22 RX ADMIN — INSULIN DETEMIR 10 UNITS: 100 INJECTION, SOLUTION SUBCUTANEOUS at 01:24

## 2018-11-22 RX ADMIN — DOCUSATE SODIUM 100 MG: 100 CAPSULE, LIQUID FILLED ORAL at 21:03

## 2018-11-22 RX ADMIN — TIZANIDINE 4 MG: 4 TABLET ORAL at 21:03

## 2018-11-22 RX ADMIN — GABAPENTIN 400 MG: 400 CAPSULE ORAL at 21:03

## 2018-11-22 RX ADMIN — ONDANSETRON 4 MG: 2 INJECTION INTRAMUSCULAR; INTRAVENOUS at 12:26

## 2018-11-22 RX ADMIN — OXYBUTYNIN CHLORIDE 10 MG: 5 TABLET, EXTENDED RELEASE ORAL at 09:42

## 2018-11-22 RX ADMIN — METOPROLOL TARTRATE 25 MG: 25 TABLET ORAL at 01:57

## 2018-11-22 RX ADMIN — CARBAMAZEPINE 50 MG: 200 TABLET ORAL at 21:37

## 2018-11-22 RX ADMIN — ATORVASTATIN CALCIUM 80 MG: 40 TABLET, FILM COATED ORAL at 21:02

## 2018-11-22 RX ADMIN — CARVEDILOL 3.12 MG: 3.12 TABLET, FILM COATED ORAL at 21:03

## 2018-11-22 RX ADMIN — RANOLAZINE 500 MG: 500 TABLET, FILM COATED, EXTENDED RELEASE ORAL at 21:02

## 2018-11-22 RX ADMIN — MORPHINE SULFATE 30 MG: 30 TABLET, EXTENDED RELEASE ORAL at 02:20

## 2018-11-22 RX ADMIN — DOCUSATE SODIUM 100 MG: 100 CAPSULE, LIQUID FILLED ORAL at 01:26

## 2018-11-22 RX ADMIN — INSULIN LISPRO 4 UNITS: 100 INJECTION, SOLUTION INTRAVENOUS; SUBCUTANEOUS at 17:04

## 2018-11-22 NOTE — OUTREACH NOTE
AMI Week 2 Survey      Responses   Facility patient discharged from?  [place]   Does the patient have one of the following disease processes/diagnoses(primary or secondary)?  Acute MI (STEMI,NSTEMI)   Week 2 attempt successful?  No   Revoke  Readmitted          Yohana Marte RN

## 2018-11-23 LAB
GLUCOSE BLDC GLUCOMTR-MCNC: 119 MG/DL (ref 70–130)
GLUCOSE BLDC GLUCOMTR-MCNC: 177 MG/DL (ref 70–130)
GLUCOSE BLDC GLUCOMTR-MCNC: 248 MG/DL (ref 70–130)
GLUCOSE BLDC GLUCOMTR-MCNC: 253 MG/DL (ref 70–130)
TROPONIN I SERPL-MCNC: 0.56 NG/ML

## 2018-11-23 PROCEDURE — 99232 SBSQ HOSP IP/OBS MODERATE 35: CPT | Performed by: INTERNAL MEDICINE

## 2018-11-23 PROCEDURE — 63710000001 INSULIN LISPRO (HUMAN) PER 5 UNITS: Performed by: INTERNAL MEDICINE

## 2018-11-23 PROCEDURE — 63710000001 INSULIN DETEMIR PER 5 UNITS: Performed by: INTERNAL MEDICINE

## 2018-11-23 PROCEDURE — 82962 GLUCOSE BLOOD TEST: CPT

## 2018-11-23 PROCEDURE — 84484 ASSAY OF TROPONIN QUANT: CPT | Performed by: INTERNAL MEDICINE

## 2018-11-23 PROCEDURE — 25010000002 ENOXAPARIN PER 10 MG: Performed by: NURSE PRACTITIONER

## 2018-11-23 RX ORDER — GUAIFENESIN 600 MG/1
600 TABLET, EXTENDED RELEASE ORAL EVERY 12 HOURS SCHEDULED
Status: DISCONTINUED | OUTPATIENT
Start: 2018-11-23 | End: 2018-11-29 | Stop reason: HOSPADM

## 2018-11-23 RX ADMIN — CYCLOSPORINE 1 DROP: 0.5 EMULSION OPHTHALMIC at 21:24

## 2018-11-23 RX ADMIN — DOCUSATE SODIUM 100 MG: 100 CAPSULE, LIQUID FILLED ORAL at 09:51

## 2018-11-23 RX ADMIN — MORPHINE SULFATE 15 MG: 30 TABLET ORAL at 07:03

## 2018-11-23 RX ADMIN — ENOXAPARIN SODIUM 30 MG: 30 INJECTION SUBCUTANEOUS at 13:52

## 2018-11-23 RX ADMIN — DOCUSATE SODIUM 100 MG: 100 CAPSULE, LIQUID FILLED ORAL at 21:23

## 2018-11-23 RX ADMIN — CLOPIDOGREL 75 MG: 75 TABLET, FILM COATED ORAL at 09:50

## 2018-11-23 RX ADMIN — SODIUM CHLORIDE, PRESERVATIVE FREE 3 ML: 5 INJECTION INTRAVENOUS at 09:44

## 2018-11-23 RX ADMIN — CARVEDILOL 3.12 MG: 3.12 TABLET, FILM COATED ORAL at 21:23

## 2018-11-23 RX ADMIN — SODIUM CHLORIDE, PRESERVATIVE FREE 3 ML: 5 INJECTION INTRAVENOUS at 21:24

## 2018-11-23 RX ADMIN — GABAPENTIN 400 MG: 400 CAPSULE ORAL at 09:50

## 2018-11-23 RX ADMIN — INSULIN LISPRO 3 UNITS: 100 INJECTION, SOLUTION INTRAVENOUS; SUBCUTANEOUS at 13:52

## 2018-11-23 RX ADMIN — INSULIN LISPRO 2 UNITS: 100 INJECTION, SOLUTION INTRAVENOUS; SUBCUTANEOUS at 17:30

## 2018-11-23 RX ADMIN — INSULIN DETEMIR 12 UNITS: 100 INJECTION, SOLUTION SUBCUTANEOUS at 21:21

## 2018-11-23 RX ADMIN — PANTOPRAZOLE SODIUM 40 MG: 40 TABLET, DELAYED RELEASE ORAL at 17:30

## 2018-11-23 RX ADMIN — ASPIRIN 81 MG: 81 TABLET, COATED ORAL at 09:50

## 2018-11-23 RX ADMIN — INSULIN LISPRO 4 UNITS: 100 INJECTION, SOLUTION INTRAVENOUS; SUBCUTANEOUS at 09:59

## 2018-11-23 RX ADMIN — MORPHINE SULFATE 30 MG: 30 TABLET, EXTENDED RELEASE ORAL at 13:51

## 2018-11-23 RX ADMIN — CARBAMAZEPINE 50 MG: 200 TABLET ORAL at 09:51

## 2018-11-23 RX ADMIN — RANOLAZINE 500 MG: 500 TABLET, FILM COATED, EXTENDED RELEASE ORAL at 09:50

## 2018-11-23 RX ADMIN — CYCLOSPORINE 1 DROP: 0.5 EMULSION OPHTHALMIC at 09:52

## 2018-11-23 RX ADMIN — RANOLAZINE 500 MG: 500 TABLET, FILM COATED, EXTENDED RELEASE ORAL at 21:23

## 2018-11-23 RX ADMIN — LOSARTAN POTASSIUM 100 MG: 50 TABLET ORAL at 09:50

## 2018-11-23 RX ADMIN — ATORVASTATIN CALCIUM 80 MG: 40 TABLET, FILM COATED ORAL at 21:23

## 2018-11-23 RX ADMIN — GUAIFENESIN 600 MG: 600 TABLET, EXTENDED RELEASE ORAL at 13:51

## 2018-11-23 RX ADMIN — PANTOPRAZOLE SODIUM 40 MG: 40 TABLET, DELAYED RELEASE ORAL at 06:42

## 2018-11-23 RX ADMIN — CARVEDILOL 3.12 MG: 3.12 TABLET, FILM COATED ORAL at 09:51

## 2018-11-23 RX ADMIN — INSULIN LISPRO 4 UNITS: 100 INJECTION, SOLUTION INTRAVENOUS; SUBCUTANEOUS at 21:25

## 2018-11-23 RX ADMIN — MONTELUKAST SODIUM 10 MG: 10 TABLET, COATED ORAL at 21:23

## 2018-11-23 RX ADMIN — NITROFURANTOIN (MACROCRYSTALS) 50 MG: 50 CAPSULE ORAL at 21:23

## 2018-11-23 RX ADMIN — GUAIFENESIN 600 MG: 600 TABLET, EXTENDED RELEASE ORAL at 21:23

## 2018-11-23 RX ADMIN — INSULIN LISPRO 4 UNITS: 100 INJECTION, SOLUTION INTRAVENOUS; SUBCUTANEOUS at 17:30

## 2018-11-23 RX ADMIN — INSULIN LISPRO 4 UNITS: 100 INJECTION, SOLUTION INTRAVENOUS; SUBCUTANEOUS at 13:52

## 2018-11-23 RX ADMIN — MORPHINE SULFATE 15 MG: 30 TABLET ORAL at 19:36

## 2018-11-23 RX ADMIN — GABAPENTIN 400 MG: 400 CAPSULE ORAL at 21:23

## 2018-11-23 RX ADMIN — OXYBUTYNIN CHLORIDE 10 MG: 5 TABLET, EXTENDED RELEASE ORAL at 09:50

## 2018-11-24 LAB
GLUCOSE BLDC GLUCOMTR-MCNC: 169 MG/DL (ref 70–130)
GLUCOSE BLDC GLUCOMTR-MCNC: 199 MG/DL (ref 70–130)
GLUCOSE BLDC GLUCOMTR-MCNC: 212 MG/DL (ref 70–130)
GLUCOSE BLDC GLUCOMTR-MCNC: 255 MG/DL (ref 70–130)

## 2018-11-24 PROCEDURE — 99232 SBSQ HOSP IP/OBS MODERATE 35: CPT | Performed by: INTERNAL MEDICINE

## 2018-11-24 PROCEDURE — 85025 COMPLETE CBC W/AUTO DIFF WBC: CPT | Performed by: INTERNAL MEDICINE

## 2018-11-24 PROCEDURE — 82962 GLUCOSE BLOOD TEST: CPT

## 2018-11-24 PROCEDURE — 93005 ELECTROCARDIOGRAM TRACING: CPT | Performed by: INTERNAL MEDICINE

## 2018-11-24 PROCEDURE — 63710000001 INSULIN DETEMIR PER 5 UNITS: Performed by: INTERNAL MEDICINE

## 2018-11-24 PROCEDURE — 25010000002 ENOXAPARIN PER 10 MG: Performed by: INTERNAL MEDICINE

## 2018-11-24 PROCEDURE — 94799 UNLISTED PULMONARY SVC/PX: CPT

## 2018-11-24 PROCEDURE — 94760 N-INVAS EAR/PLS OXIMETRY 1: CPT

## 2018-11-24 PROCEDURE — 85007 BL SMEAR W/DIFF WBC COUNT: CPT | Performed by: INTERNAL MEDICINE

## 2018-11-24 PROCEDURE — 93010 ELECTROCARDIOGRAM REPORT: CPT | Performed by: INTERNAL MEDICINE

## 2018-11-24 RX ORDER — AMLODIPINE BESYLATE 5 MG/1
5 TABLET ORAL
Status: DISCONTINUED | OUTPATIENT
Start: 2018-11-24 | End: 2018-11-29 | Stop reason: HOSPADM

## 2018-11-24 RX ORDER — FEXOFENADINE HCL 180 MG/1
180 TABLET ORAL 2 TIMES DAILY
Status: DISCONTINUED | OUTPATIENT
Start: 2018-11-24 | End: 2018-11-29 | Stop reason: HOSPADM

## 2018-11-24 RX ADMIN — INSULIN LISPRO 2 UNITS: 100 INJECTION, SOLUTION INTRAVENOUS; SUBCUTANEOUS at 12:13

## 2018-11-24 RX ADMIN — MONTELUKAST SODIUM 10 MG: 10 TABLET, COATED ORAL at 21:58

## 2018-11-24 RX ADMIN — RANOLAZINE 500 MG: 500 TABLET, FILM COATED, EXTENDED RELEASE ORAL at 08:47

## 2018-11-24 RX ADMIN — CARBAMAZEPINE 50 MG: 200 TABLET ORAL at 08:47

## 2018-11-24 RX ADMIN — RANOLAZINE 500 MG: 500 TABLET, FILM COATED, EXTENDED RELEASE ORAL at 21:59

## 2018-11-24 RX ADMIN — INSULIN LISPRO 4 UNITS: 100 INJECTION, SOLUTION INTRAVENOUS; SUBCUTANEOUS at 21:59

## 2018-11-24 RX ADMIN — FEXOFENADINE HCL 180 MG: 180 TABLET ORAL at 22:20

## 2018-11-24 RX ADMIN — DIAZEPAM 2 MG: 2 TABLET ORAL at 00:15

## 2018-11-24 RX ADMIN — INSULIN LISPRO 4 UNITS: 100 INJECTION, SOLUTION INTRAVENOUS; SUBCUTANEOUS at 08:49

## 2018-11-24 RX ADMIN — CARVEDILOL 3.12 MG: 3.12 TABLET, FILM COATED ORAL at 21:58

## 2018-11-24 RX ADMIN — INSULIN DETEMIR 12 UNITS: 100 INJECTION, SOLUTION SUBCUTANEOUS at 21:52

## 2018-11-24 RX ADMIN — CALCIUM CARBONATE 1 TABLET: 500 TABLET ORAL at 00:24

## 2018-11-24 RX ADMIN — ASPIRIN 81 MG: 81 TABLET, COATED ORAL at 08:46

## 2018-11-24 RX ADMIN — DOCUSATE SODIUM 100 MG: 100 CAPSULE, LIQUID FILLED ORAL at 21:58

## 2018-11-24 RX ADMIN — CARBAMAZEPINE 50 MG: 200 TABLET ORAL at 00:17

## 2018-11-24 RX ADMIN — INSULIN LISPRO 3 UNITS: 100 INJECTION, SOLUTION INTRAVENOUS; SUBCUTANEOUS at 16:49

## 2018-11-24 RX ADMIN — MORPHINE SULFATE 15 MG: 30 TABLET ORAL at 06:51

## 2018-11-24 RX ADMIN — ENOXAPARIN SODIUM 60 MG: 60 INJECTION SUBCUTANEOUS at 21:58

## 2018-11-24 RX ADMIN — CYCLOSPORINE 1 DROP: 0.5 EMULSION OPHTHALMIC at 22:00

## 2018-11-24 RX ADMIN — PANTOPRAZOLE SODIUM 40 MG: 40 TABLET, DELAYED RELEASE ORAL at 16:47

## 2018-11-24 RX ADMIN — INSULIN LISPRO 2 UNITS: 100 INJECTION, SOLUTION INTRAVENOUS; SUBCUTANEOUS at 08:48

## 2018-11-24 RX ADMIN — GUAIFENESIN 600 MG: 600 TABLET, EXTENDED RELEASE ORAL at 08:47

## 2018-11-24 RX ADMIN — ALBUTEROL SULFATE 2.5 MG: 2.5 SOLUTION RESPIRATORY (INHALATION) at 21:24

## 2018-11-24 RX ADMIN — OXYBUTYNIN CHLORIDE 10 MG: 5 TABLET, EXTENDED RELEASE ORAL at 08:47

## 2018-11-24 RX ADMIN — NITROFURANTOIN (MACROCRYSTALS) 50 MG: 50 CAPSULE ORAL at 22:00

## 2018-11-24 RX ADMIN — ATORVASTATIN CALCIUM 80 MG: 40 TABLET, FILM COATED ORAL at 21:59

## 2018-11-24 RX ADMIN — ENOXAPARIN SODIUM 60 MG: 60 INJECTION SUBCUTANEOUS at 08:47

## 2018-11-24 RX ADMIN — CYCLOSPORINE 1 DROP: 0.5 EMULSION OPHTHALMIC at 09:06

## 2018-11-24 RX ADMIN — INSULIN LISPRO 4 UNITS: 100 INJECTION, SOLUTION INTRAVENOUS; SUBCUTANEOUS at 12:13

## 2018-11-24 RX ADMIN — CARVEDILOL 3.12 MG: 3.12 TABLET, FILM COATED ORAL at 08:47

## 2018-11-24 RX ADMIN — PANTOPRAZOLE SODIUM 40 MG: 40 TABLET, DELAYED RELEASE ORAL at 06:51

## 2018-11-24 RX ADMIN — GABAPENTIN 400 MG: 400 CAPSULE ORAL at 08:47

## 2018-11-24 RX ADMIN — NITROGLYCERIN 0.4 MG: 0.4 TABLET SUBLINGUAL at 01:09

## 2018-11-24 RX ADMIN — GUAIFENESIN 600 MG: 600 TABLET, EXTENDED RELEASE ORAL at 21:58

## 2018-11-24 RX ADMIN — SODIUM CHLORIDE, PRESERVATIVE FREE 3 ML: 5 INJECTION INTRAVENOUS at 08:50

## 2018-11-24 RX ADMIN — CLOPIDOGREL 75 MG: 75 TABLET, FILM COATED ORAL at 08:47

## 2018-11-24 RX ADMIN — NITROGLYCERIN 0.4 MG: 0.4 TABLET SUBLINGUAL at 01:02

## 2018-11-24 RX ADMIN — DOCUSATE SODIUM 100 MG: 100 CAPSULE, LIQUID FILLED ORAL at 08:48

## 2018-11-24 RX ADMIN — SODIUM CHLORIDE, PRESERVATIVE FREE 3 ML: 5 INJECTION INTRAVENOUS at 22:30

## 2018-11-24 RX ADMIN — GABAPENTIN 400 MG: 400 CAPSULE ORAL at 21:59

## 2018-11-24 RX ADMIN — INSULIN LISPRO 4 UNITS: 100 INJECTION, SOLUTION INTRAVENOUS; SUBCUTANEOUS at 16:48

## 2018-11-24 RX ADMIN — AMLODIPINE BESYLATE 5 MG: 5 TABLET ORAL at 09:05

## 2018-11-24 RX ADMIN — MORPHINE SULFATE 15 MG: 30 TABLET ORAL at 19:48

## 2018-11-24 RX ADMIN — MORPHINE SULFATE 30 MG: 30 TABLET, EXTENDED RELEASE ORAL at 12:12

## 2018-11-24 RX ADMIN — MORPHINE SULFATE 30 MG: 30 TABLET, EXTENDED RELEASE ORAL at 00:16

## 2018-11-24 RX ADMIN — NITROGLYCERIN 0.4 MG: 0.4 TABLET SUBLINGUAL at 01:28

## 2018-11-24 RX ADMIN — TIZANIDINE 4 MG: 4 TABLET ORAL at 00:15

## 2018-11-25 LAB
BASOPHILS # BLD AUTO: 0.03 10*3/MM3 (ref 0–0.2)
BASOPHILS NFR BLD AUTO: 0.4 % (ref 0–1)
DEPRECATED RDW RBC AUTO: 45.3 FL (ref 37–54)
EOSINOPHIL # BLD AUTO: 0.22 10*3/MM3 (ref 0–0.3)
EOSINOPHIL NFR BLD AUTO: 2.7 % (ref 0–3)
ERYTHROCYTE [DISTWIDTH] IN BLOOD BY AUTOMATED COUNT: 13.5 % (ref 11.3–14.5)
GLUCOSE BLDC GLUCOMTR-MCNC: 135 MG/DL (ref 70–130)
GLUCOSE BLDC GLUCOMTR-MCNC: 163 MG/DL (ref 70–130)
GLUCOSE BLDC GLUCOMTR-MCNC: 165 MG/DL (ref 70–130)
GLUCOSE BLDC GLUCOMTR-MCNC: 258 MG/DL (ref 70–130)
HCT VFR BLD AUTO: 32.4 % (ref 34.5–44)
HGB BLD-MCNC: 10.6 G/DL (ref 11.5–15.5)
IMM GRANULOCYTES # BLD: 0.04 10*3/MM3 (ref 0–0.03)
IMM GRANULOCYTES NFR BLD: 0.5 % (ref 0–0.6)
LYMPHOCYTES # BLD AUTO: 1.66 10*3/MM3 (ref 0.6–4.8)
LYMPHOCYTES NFR BLD AUTO: 20.5 % (ref 24–44)
MCH RBC QN AUTO: 30.7 PG (ref 27–31)
MCHC RBC AUTO-ENTMCNC: 32.7 G/DL (ref 32–36)
MCV RBC AUTO: 93.9 FL (ref 80–99)
MONOCYTES # BLD AUTO: 0.53 10*3/MM3 (ref 0–1)
MONOCYTES NFR BLD AUTO: 6.5 % (ref 0–12)
NEUTROPHILS # BLD AUTO: 5.66 10*3/MM3 (ref 1.5–8.3)
NEUTROPHILS NFR BLD AUTO: 69.9 % (ref 41–71)
PLAT MORPH BLD: NORMAL
PLATELET # BLD AUTO: 253 10*3/MM3 (ref 150–450)
PMV BLD AUTO: 11.5 FL (ref 6–12)
RBC # BLD AUTO: 3.45 10*6/MM3 (ref 3.89–5.14)
RBC MORPH BLD: NORMAL
WBC MORPH BLD: NORMAL
WBC NRBC COR # BLD: 8.1 10*3/MM3 (ref 3.5–10.8)

## 2018-11-25 PROCEDURE — 25010000002 ENOXAPARIN PER 10 MG: Performed by: INTERNAL MEDICINE

## 2018-11-25 PROCEDURE — 82962 GLUCOSE BLOOD TEST: CPT

## 2018-11-25 PROCEDURE — 99232 SBSQ HOSP IP/OBS MODERATE 35: CPT | Performed by: INTERNAL MEDICINE

## 2018-11-25 PROCEDURE — 63710000001 INSULIN DETEMIR PER 5 UNITS: Performed by: INTERNAL MEDICINE

## 2018-11-25 PROCEDURE — 94640 AIRWAY INHALATION TREATMENT: CPT

## 2018-11-25 RX ADMIN — TIZANIDINE 4 MG: 4 TABLET ORAL at 00:06

## 2018-11-25 RX ADMIN — AMLODIPINE BESYLATE 5 MG: 5 TABLET ORAL at 09:12

## 2018-11-25 RX ADMIN — INSULIN LISPRO 4 UNITS: 100 INJECTION, SOLUTION INTRAVENOUS; SUBCUTANEOUS at 09:09

## 2018-11-25 RX ADMIN — GABAPENTIN 400 MG: 400 CAPSULE ORAL at 09:09

## 2018-11-25 RX ADMIN — SODIUM CHLORIDE, PRESERVATIVE FREE 3 ML: 5 INJECTION INTRAVENOUS at 20:26

## 2018-11-25 RX ADMIN — NITROFURANTOIN (MACROCRYSTALS) 50 MG: 50 CAPSULE ORAL at 20:25

## 2018-11-25 RX ADMIN — INSULIN LISPRO 4 UNITS: 100 INJECTION, SOLUTION INTRAVENOUS; SUBCUTANEOUS at 17:45

## 2018-11-25 RX ADMIN — CLOPIDOGREL 75 MG: 75 TABLET, FILM COATED ORAL at 09:12

## 2018-11-25 RX ADMIN — INSULIN LISPRO 2 UNITS: 100 INJECTION, SOLUTION INTRAVENOUS; SUBCUTANEOUS at 17:44

## 2018-11-25 RX ADMIN — INSULIN DETEMIR 12 UNITS: 100 INJECTION, SOLUTION SUBCUTANEOUS at 21:26

## 2018-11-25 RX ADMIN — GUAIFENESIN 600 MG: 600 TABLET, EXTENDED RELEASE ORAL at 20:24

## 2018-11-25 RX ADMIN — RANOLAZINE 500 MG: 500 TABLET, FILM COATED, EXTENDED RELEASE ORAL at 20:25

## 2018-11-25 RX ADMIN — ENOXAPARIN SODIUM 60 MG: 60 INJECTION SUBCUTANEOUS at 20:29

## 2018-11-25 RX ADMIN — CARBAMAZEPINE 50 MG: 200 TABLET ORAL at 20:24

## 2018-11-25 RX ADMIN — CYCLOSPORINE 1 DROP: 0.5 EMULSION OPHTHALMIC at 20:29

## 2018-11-25 RX ADMIN — INSULIN LISPRO 2 UNITS: 100 INJECTION, SOLUTION INTRAVENOUS; SUBCUTANEOUS at 20:30

## 2018-11-25 RX ADMIN — RANOLAZINE 500 MG: 500 TABLET, FILM COATED, EXTENDED RELEASE ORAL at 09:10

## 2018-11-25 RX ADMIN — CARVEDILOL 3.12 MG: 3.12 TABLET, FILM COATED ORAL at 09:12

## 2018-11-25 RX ADMIN — MONTELUKAST SODIUM 10 MG: 10 TABLET, COATED ORAL at 20:23

## 2018-11-25 RX ADMIN — MORPHINE SULFATE 30 MG: 30 TABLET, EXTENDED RELEASE ORAL at 00:06

## 2018-11-25 RX ADMIN — ALBUTEROL SULFATE 2.5 MG: 2.5 SOLUTION RESPIRATORY (INHALATION) at 09:19

## 2018-11-25 RX ADMIN — DOCUSATE SODIUM 100 MG: 100 CAPSULE, LIQUID FILLED ORAL at 09:12

## 2018-11-25 RX ADMIN — ASPIRIN 81 MG: 81 TABLET, COATED ORAL at 09:12

## 2018-11-25 RX ADMIN — ATORVASTATIN CALCIUM 80 MG: 40 TABLET, FILM COATED ORAL at 20:25

## 2018-11-25 RX ADMIN — MORPHINE SULFATE 15 MG: 30 TABLET ORAL at 06:13

## 2018-11-25 RX ADMIN — FEXOFENADINE HCL 180 MG: 180 TABLET ORAL at 09:15

## 2018-11-25 RX ADMIN — FEXOFENADINE HCL 180 MG: 180 TABLET ORAL at 20:25

## 2018-11-25 RX ADMIN — CARBAMAZEPINE 50 MG: 200 TABLET ORAL at 00:05

## 2018-11-25 RX ADMIN — DIAZEPAM 2 MG: 2 TABLET ORAL at 00:06

## 2018-11-25 RX ADMIN — SODIUM CHLORIDE, PRESERVATIVE FREE 3 ML: 5 INJECTION INTRAVENOUS at 09:15

## 2018-11-25 RX ADMIN — OXYBUTYNIN CHLORIDE 10 MG: 5 TABLET, EXTENDED RELEASE ORAL at 09:12

## 2018-11-25 RX ADMIN — GUAIFENESIN 600 MG: 600 TABLET, EXTENDED RELEASE ORAL at 09:09

## 2018-11-25 RX ADMIN — DOCUSATE SODIUM 100 MG: 100 CAPSULE, LIQUID FILLED ORAL at 20:23

## 2018-11-25 RX ADMIN — INSULIN LISPRO 4 UNITS: 100 INJECTION, SOLUTION INTRAVENOUS; SUBCUTANEOUS at 13:05

## 2018-11-25 RX ADMIN — CARBAMAZEPINE 50 MG: 200 TABLET ORAL at 09:12

## 2018-11-25 RX ADMIN — MORPHINE SULFATE 15 MG: 30 TABLET ORAL at 18:28

## 2018-11-25 RX ADMIN — GABAPENTIN 400 MG: 400 CAPSULE ORAL at 20:24

## 2018-11-25 RX ADMIN — CYCLOSPORINE 1 DROP: 0.5 EMULSION OPHTHALMIC at 10:11

## 2018-11-25 RX ADMIN — ENOXAPARIN SODIUM 60 MG: 60 INJECTION SUBCUTANEOUS at 09:10

## 2018-11-25 RX ADMIN — CARVEDILOL 3.12 MG: 3.12 TABLET, FILM COATED ORAL at 20:25

## 2018-11-25 RX ADMIN — PANTOPRAZOLE SODIUM 40 MG: 40 TABLET, DELAYED RELEASE ORAL at 17:44

## 2018-11-25 RX ADMIN — PANTOPRAZOLE SODIUM 40 MG: 40 TABLET, DELAYED RELEASE ORAL at 09:11

## 2018-11-25 RX ADMIN — MORPHINE SULFATE 30 MG: 30 TABLET, EXTENDED RELEASE ORAL at 12:11

## 2018-11-26 LAB
GLUCOSE BLDC GLUCOMTR-MCNC: 181 MG/DL (ref 70–130)
GLUCOSE BLDC GLUCOMTR-MCNC: 197 MG/DL (ref 70–130)
GLUCOSE BLDC GLUCOMTR-MCNC: 243 MG/DL (ref 70–130)
GLUCOSE BLDC GLUCOMTR-MCNC: 256 MG/DL (ref 70–130)

## 2018-11-26 PROCEDURE — 99232 SBSQ HOSP IP/OBS MODERATE 35: CPT | Performed by: INTERNAL MEDICINE

## 2018-11-26 PROCEDURE — 94640 AIRWAY INHALATION TREATMENT: CPT

## 2018-11-26 PROCEDURE — 63710000001 INSULIN DETEMIR PER 5 UNITS: Performed by: INTERNAL MEDICINE

## 2018-11-26 PROCEDURE — 82962 GLUCOSE BLOOD TEST: CPT

## 2018-11-26 PROCEDURE — 94799 UNLISTED PULMONARY SVC/PX: CPT

## 2018-11-26 PROCEDURE — 94760 N-INVAS EAR/PLS OXIMETRY 1: CPT

## 2018-11-26 RX ADMIN — MONTELUKAST SODIUM 10 MG: 10 TABLET, COATED ORAL at 21:15

## 2018-11-26 RX ADMIN — MORPHINE SULFATE 30 MG: 30 TABLET, EXTENDED RELEASE ORAL at 12:24

## 2018-11-26 RX ADMIN — RANOLAZINE 500 MG: 500 TABLET, FILM COATED, EXTENDED RELEASE ORAL at 09:55

## 2018-11-26 RX ADMIN — GUAIFENESIN 600 MG: 600 TABLET, EXTENDED RELEASE ORAL at 21:15

## 2018-11-26 RX ADMIN — CYCLOSPORINE 1 DROP: 0.5 EMULSION OPHTHALMIC at 21:18

## 2018-11-26 RX ADMIN — INSULIN LISPRO 4 UNITS: 100 INJECTION, SOLUTION INTRAVENOUS; SUBCUTANEOUS at 17:24

## 2018-11-26 RX ADMIN — GABAPENTIN 400 MG: 400 CAPSULE ORAL at 09:55

## 2018-11-26 RX ADMIN — INSULIN LISPRO 3 UNITS: 100 INJECTION, SOLUTION INTRAVENOUS; SUBCUTANEOUS at 12:27

## 2018-11-26 RX ADMIN — DOCUSATE SODIUM 100 MG: 100 CAPSULE, LIQUID FILLED ORAL at 09:55

## 2018-11-26 RX ADMIN — DOCUSATE SODIUM 100 MG: 100 CAPSULE, LIQUID FILLED ORAL at 21:15

## 2018-11-26 RX ADMIN — AMLODIPINE BESYLATE 5 MG: 5 TABLET ORAL at 09:55

## 2018-11-26 RX ADMIN — GABAPENTIN 400 MG: 400 CAPSULE ORAL at 21:15

## 2018-11-26 RX ADMIN — CYCLOSPORINE 1 DROP: 0.5 EMULSION OPHTHALMIC at 11:06

## 2018-11-26 RX ADMIN — ASPIRIN 81 MG: 81 TABLET, COATED ORAL at 09:55

## 2018-11-26 RX ADMIN — INSULIN LISPRO 4 UNITS: 100 INJECTION, SOLUTION INTRAVENOUS; SUBCUTANEOUS at 12:25

## 2018-11-26 RX ADMIN — RANOLAZINE 500 MG: 500 TABLET, FILM COATED, EXTENDED RELEASE ORAL at 21:15

## 2018-11-26 RX ADMIN — INSULIN LISPRO 4 UNITS: 100 INJECTION, SOLUTION INTRAVENOUS; SUBCUTANEOUS at 09:59

## 2018-11-26 RX ADMIN — INSULIN LISPRO 2 UNITS: 100 INJECTION, SOLUTION INTRAVENOUS; SUBCUTANEOUS at 09:58

## 2018-11-26 RX ADMIN — TIZANIDINE 4 MG: 4 TABLET ORAL at 00:07

## 2018-11-26 RX ADMIN — MORPHINE SULFATE 30 MG: 30 TABLET, EXTENDED RELEASE ORAL at 00:08

## 2018-11-26 RX ADMIN — OXYBUTYNIN CHLORIDE 10 MG: 5 TABLET, EXTENDED RELEASE ORAL at 09:55

## 2018-11-26 RX ADMIN — MORPHINE SULFATE 15 MG: 30 TABLET ORAL at 06:17

## 2018-11-26 RX ADMIN — CARBAMAZEPINE 50 MG: 200 TABLET ORAL at 21:16

## 2018-11-26 RX ADMIN — CARVEDILOL 3.12 MG: 3.12 TABLET, FILM COATED ORAL at 09:55

## 2018-11-26 RX ADMIN — SODIUM CHLORIDE, PRESERVATIVE FREE 3 ML: 5 INJECTION INTRAVENOUS at 21:19

## 2018-11-26 RX ADMIN — FEXOFENADINE HCL 180 MG: 180 TABLET ORAL at 09:55

## 2018-11-26 RX ADMIN — PANTOPRAZOLE SODIUM 40 MG: 40 TABLET, DELAYED RELEASE ORAL at 06:15

## 2018-11-26 RX ADMIN — PANTOPRAZOLE SODIUM 40 MG: 40 TABLET, DELAYED RELEASE ORAL at 17:23

## 2018-11-26 RX ADMIN — INSULIN LISPRO 4 UNITS: 100 INJECTION, SOLUTION INTRAVENOUS; SUBCUTANEOUS at 21:19

## 2018-11-26 RX ADMIN — ATORVASTATIN CALCIUM 80 MG: 40 TABLET, FILM COATED ORAL at 21:15

## 2018-11-26 RX ADMIN — MORPHINE SULFATE 15 MG: 30 TABLET ORAL at 18:22

## 2018-11-26 RX ADMIN — CLOPIDOGREL 75 MG: 75 TABLET, FILM COATED ORAL at 09:55

## 2018-11-26 RX ADMIN — FEXOFENADINE HCL 180 MG: 180 TABLET ORAL at 21:16

## 2018-11-26 RX ADMIN — GUAIFENESIN 600 MG: 600 TABLET, EXTENDED RELEASE ORAL at 09:55

## 2018-11-26 RX ADMIN — INSULIN DETEMIR 12 UNITS: 100 INJECTION, SOLUTION SUBCUTANEOUS at 22:17

## 2018-11-26 RX ADMIN — INSULIN LISPRO 2 UNITS: 100 INJECTION, SOLUTION INTRAVENOUS; SUBCUTANEOUS at 17:23

## 2018-11-26 RX ADMIN — NITROFURANTOIN (MACROCRYSTALS) 50 MG: 50 CAPSULE ORAL at 21:15

## 2018-11-26 RX ADMIN — ALBUTEROL SULFATE 2.5 MG: 2.5 SOLUTION RESPIRATORY (INHALATION) at 13:07

## 2018-11-26 RX ADMIN — DIAZEPAM 2 MG: 2 TABLET ORAL at 00:07

## 2018-11-26 RX ADMIN — CARBAMAZEPINE 50 MG: 200 TABLET ORAL at 09:54

## 2018-11-26 RX ADMIN — CARVEDILOL 3.12 MG: 3.12 TABLET, FILM COATED ORAL at 21:16

## 2018-11-26 RX ADMIN — SODIUM CHLORIDE, PRESERVATIVE FREE 3 ML: 5 INJECTION INTRAVENOUS at 09:55

## 2018-11-27 LAB
BASOPHILS # BLD AUTO: 0.02 10*3/MM3 (ref 0–0.2)
BASOPHILS NFR BLD AUTO: 0.1 % (ref 0–1)
DEPRECATED RDW RBC AUTO: 46.5 FL (ref 37–54)
EOSINOPHIL # BLD AUTO: 0.25 10*3/MM3 (ref 0–0.3)
EOSINOPHIL NFR BLD AUTO: 1.8 % (ref 0–3)
ERYTHROCYTE [DISTWIDTH] IN BLOOD BY AUTOMATED COUNT: 13.7 % (ref 11.3–14.5)
GLUCOSE BLDC GLUCOMTR-MCNC: 150 MG/DL (ref 70–130)
GLUCOSE BLDC GLUCOMTR-MCNC: 171 MG/DL (ref 70–130)
GLUCOSE BLDC GLUCOMTR-MCNC: 181 MG/DL (ref 70–130)
GLUCOSE BLDC GLUCOMTR-MCNC: 196 MG/DL (ref 70–130)
HCT VFR BLD AUTO: 34.6 % (ref 34.5–44)
HGB BLD-MCNC: 11.1 G/DL (ref 11.5–15.5)
IMM GRANULOCYTES # BLD: 0.05 10*3/MM3 (ref 0–0.03)
IMM GRANULOCYTES NFR BLD: 0.4 % (ref 0–0.6)
LYMPHOCYTES # BLD AUTO: 1.4 10*3/MM3 (ref 0.6–4.8)
LYMPHOCYTES NFR BLD AUTO: 10 % (ref 24–44)
MCH RBC QN AUTO: 30.2 PG (ref 27–31)
MCHC RBC AUTO-ENTMCNC: 32.1 G/DL (ref 32–36)
MCV RBC AUTO: 94 FL (ref 80–99)
MONOCYTES # BLD AUTO: 0.8 10*3/MM3 (ref 0–1)
MONOCYTES NFR BLD AUTO: 5.7 % (ref 0–12)
NEUTROPHILS # BLD AUTO: 11.59 10*3/MM3 (ref 1.5–8.3)
NEUTROPHILS NFR BLD AUTO: 82.4 % (ref 41–71)
PLATELET # BLD AUTO: 282 10*3/MM3 (ref 150–450)
PMV BLD AUTO: 11 FL (ref 6–12)
RBC # BLD AUTO: 3.68 10*6/MM3 (ref 3.89–5.14)
WBC NRBC COR # BLD: 14.06 10*3/MM3 (ref 3.5–10.8)

## 2018-11-27 PROCEDURE — 25010000002 MORPHINE PER 10 MG: Performed by: INTERNAL MEDICINE

## 2018-11-27 PROCEDURE — 63710000001 INSULIN DETEMIR PER 5 UNITS: Performed by: INTERNAL MEDICINE

## 2018-11-27 PROCEDURE — 92610 EVALUATE SWALLOWING FUNCTION: CPT

## 2018-11-27 PROCEDURE — 85025 COMPLETE CBC W/AUTO DIFF WBC: CPT | Performed by: INTERNAL MEDICINE

## 2018-11-27 PROCEDURE — 99233 SBSQ HOSP IP/OBS HIGH 50: CPT | Performed by: HOSPITALIST

## 2018-11-27 PROCEDURE — 25010000002 ONDANSETRON PER 1 MG: Performed by: INTERNAL MEDICINE

## 2018-11-27 PROCEDURE — 82962 GLUCOSE BLOOD TEST: CPT

## 2018-11-27 PROCEDURE — 25010000002 ENOXAPARIN PER 10 MG: Performed by: HOSPITALIST

## 2018-11-27 RX ORDER — SCOLOPAMINE TRANSDERMAL SYSTEM 1 MG/1
1 PATCH, EXTENDED RELEASE TRANSDERMAL
Status: DISCONTINUED | OUTPATIENT
Start: 2018-11-28 | End: 2018-11-29 | Stop reason: HOSPADM

## 2018-11-27 RX ADMIN — ENOXAPARIN SODIUM 40 MG: 40 INJECTION SUBCUTANEOUS at 11:30

## 2018-11-27 RX ADMIN — DOCUSATE SODIUM 100 MG: 100 CAPSULE, LIQUID FILLED ORAL at 20:54

## 2018-11-27 RX ADMIN — TIZANIDINE 4 MG: 4 TABLET ORAL at 20:54

## 2018-11-27 RX ADMIN — INSULIN DETEMIR 12 UNITS: 100 INJECTION, SOLUTION SUBCUTANEOUS at 20:59

## 2018-11-27 RX ADMIN — GUAIFENESIN 600 MG: 600 TABLET, EXTENDED RELEASE ORAL at 08:16

## 2018-11-27 RX ADMIN — INSULIN LISPRO 2 UNITS: 100 INJECTION, SOLUTION INTRAVENOUS; SUBCUTANEOUS at 17:51

## 2018-11-27 RX ADMIN — FEXOFENADINE HCL 180 MG: 180 TABLET ORAL at 20:58

## 2018-11-27 RX ADMIN — INSULIN LISPRO 2 UNITS: 100 INJECTION, SOLUTION INTRAVENOUS; SUBCUTANEOUS at 07:42

## 2018-11-27 RX ADMIN — MORPHINE SULFATE 30 MG: 30 TABLET, EXTENDED RELEASE ORAL at 13:44

## 2018-11-27 RX ADMIN — MORPHINE SULFATE 1 MG: 2 INJECTION, SOLUTION INTRAMUSCULAR; INTRAVENOUS at 23:57

## 2018-11-27 RX ADMIN — CYCLOSPORINE 1 DROP: 0.5 EMULSION OPHTHALMIC at 11:31

## 2018-11-27 RX ADMIN — CARVEDILOL 3.12 MG: 3.12 TABLET, FILM COATED ORAL at 20:54

## 2018-11-27 RX ADMIN — CLOPIDOGREL 75 MG: 75 TABLET, FILM COATED ORAL at 08:15

## 2018-11-27 RX ADMIN — INSULIN LISPRO 4 UNITS: 100 INJECTION, SOLUTION INTRAVENOUS; SUBCUTANEOUS at 07:43

## 2018-11-27 RX ADMIN — RANOLAZINE 500 MG: 500 TABLET, FILM COATED, EXTENDED RELEASE ORAL at 20:54

## 2018-11-27 RX ADMIN — FEXOFENADINE HCL 180 MG: 180 TABLET ORAL at 08:20

## 2018-11-27 RX ADMIN — INSULIN LISPRO 4 UNITS: 100 INJECTION, SOLUTION INTRAVENOUS; SUBCUTANEOUS at 11:30

## 2018-11-27 RX ADMIN — CALCIUM CARBONATE 1 TABLET: 500 TABLET ORAL at 21:58

## 2018-11-27 RX ADMIN — ASPIRIN 81 MG: 81 TABLET, COATED ORAL at 08:15

## 2018-11-27 RX ADMIN — ONDANSETRON 4 MG: 2 INJECTION INTRAMUSCULAR; INTRAVENOUS at 21:57

## 2018-11-27 RX ADMIN — SCOPALAMINE 1 PATCH: 1 PATCH, EXTENDED RELEASE TRANSDERMAL at 23:56

## 2018-11-27 RX ADMIN — TIZANIDINE 4 MG: 4 TABLET ORAL at 00:11

## 2018-11-27 RX ADMIN — OXYBUTYNIN CHLORIDE 10 MG: 5 TABLET, EXTENDED RELEASE ORAL at 08:15

## 2018-11-27 RX ADMIN — INSULIN LISPRO 2 UNITS: 100 INJECTION, SOLUTION INTRAVENOUS; SUBCUTANEOUS at 20:54

## 2018-11-27 RX ADMIN — RANOLAZINE 500 MG: 500 TABLET, FILM COATED, EXTENDED RELEASE ORAL at 08:15

## 2018-11-27 RX ADMIN — GUAIFENESIN 600 MG: 600 TABLET, EXTENDED RELEASE ORAL at 20:54

## 2018-11-27 RX ADMIN — CARBAMAZEPINE 50 MG: 200 TABLET ORAL at 21:57

## 2018-11-27 RX ADMIN — DIAZEPAM 2 MG: 2 TABLET ORAL at 20:54

## 2018-11-27 RX ADMIN — GABAPENTIN 400 MG: 400 CAPSULE ORAL at 20:54

## 2018-11-27 RX ADMIN — PANTOPRAZOLE SODIUM 40 MG: 40 TABLET, DELAYED RELEASE ORAL at 17:50

## 2018-11-27 RX ADMIN — AMLODIPINE BESYLATE 5 MG: 5 TABLET ORAL at 08:15

## 2018-11-27 RX ADMIN — DOCUSATE SODIUM 100 MG: 100 CAPSULE, LIQUID FILLED ORAL at 08:15

## 2018-11-27 RX ADMIN — MORPHINE SULFATE 30 MG: 30 TABLET, EXTENDED RELEASE ORAL at 00:11

## 2018-11-27 RX ADMIN — NITROFURANTOIN (MACROCRYSTALS) 50 MG: 50 CAPSULE ORAL at 20:54

## 2018-11-27 RX ADMIN — GABAPENTIN 400 MG: 400 CAPSULE ORAL at 08:16

## 2018-11-27 RX ADMIN — ATORVASTATIN CALCIUM 80 MG: 40 TABLET, FILM COATED ORAL at 20:54

## 2018-11-27 RX ADMIN — INSULIN LISPRO 4 UNITS: 100 INJECTION, SOLUTION INTRAVENOUS; SUBCUTANEOUS at 17:51

## 2018-11-27 RX ADMIN — MORPHINE SULFATE 15 MG: 30 TABLET ORAL at 07:43

## 2018-11-27 RX ADMIN — CARBAMAZEPINE 50 MG: 200 TABLET ORAL at 08:15

## 2018-11-27 RX ADMIN — DIAZEPAM 2 MG: 2 TABLET ORAL at 00:11

## 2018-11-27 RX ADMIN — PANTOPRAZOLE SODIUM 40 MG: 40 TABLET, DELAYED RELEASE ORAL at 05:19

## 2018-11-27 RX ADMIN — CARVEDILOL 3.12 MG: 3.12 TABLET, FILM COATED ORAL at 08:15

## 2018-11-27 RX ADMIN — MONTELUKAST SODIUM 10 MG: 10 TABLET, COATED ORAL at 20:54

## 2018-11-27 RX ADMIN — CYCLOSPORINE 1 DROP: 0.5 EMULSION OPHTHALMIC at 22:03

## 2018-11-27 RX ADMIN — MORPHINE SULFATE 15 MG: 30 TABLET ORAL at 21:57

## 2018-11-27 RX ADMIN — INSULIN LISPRO 2 UNITS: 100 INJECTION, SOLUTION INTRAVENOUS; SUBCUTANEOUS at 11:31

## 2018-11-28 ENCOUNTER — APPOINTMENT (OUTPATIENT)
Dept: GENERAL RADIOLOGY | Facility: HOSPITAL | Age: 83
End: 2018-11-28

## 2018-11-28 PROBLEM — I25.10 CAD (CORONARY ARTERY DISEASE): Status: ACTIVE | Noted: 2018-11-28

## 2018-11-28 PROBLEM — M25.572 ANKLE PAIN, LEFT: Status: ACTIVE | Noted: 2018-11-28

## 2018-11-28 PROBLEM — G89.29 CHRONIC PAIN: Status: ACTIVE | Noted: 2018-11-28

## 2018-11-28 LAB
ACT BLD: 257 SECONDS (ref 82–152)
ACT BLD: 312 SECONDS (ref 82–152)
ACT BLD: 313 SECONDS (ref 82–152)
ANION GAP SERPL CALCULATED.3IONS-SCNC: 9 MMOL/L (ref 3–11)
BUN BLD-MCNC: 17 MG/DL (ref 9–23)
BUN/CREAT SERPL: 25.8 (ref 7–25)
CALCIUM SPEC-SCNC: 9.4 MG/DL (ref 8.7–10.4)
CHLORIDE SERPL-SCNC: 100 MMOL/L (ref 99–109)
CO2 SERPL-SCNC: 27 MMOL/L (ref 20–31)
CREAT BLD-MCNC: 0.66 MG/DL (ref 0.6–1.3)
DEPRECATED RDW RBC AUTO: 46.1 FL (ref 37–54)
ERYTHROCYTE [DISTWIDTH] IN BLOOD BY AUTOMATED COUNT: 13.5 % (ref 11.3–14.5)
GFR SERPL CREATININE-BSD FRML MDRD: 86 ML/MIN/1.73
GLUCOSE BLD-MCNC: 165 MG/DL (ref 70–100)
GLUCOSE BLDC GLUCOMTR-MCNC: 148 MG/DL (ref 70–130)
GLUCOSE BLDC GLUCOMTR-MCNC: 237 MG/DL (ref 70–130)
GLUCOSE BLDC GLUCOMTR-MCNC: 240 MG/DL (ref 70–130)
GLUCOSE BLDC GLUCOMTR-MCNC: 245 MG/DL (ref 70–130)
HCT VFR BLD AUTO: 31.9 % (ref 34.5–44)
HCT VFR BLD AUTO: 34 % (ref 34.5–44)
HGB BLD-MCNC: 10.1 G/DL (ref 11.5–15.5)
HGB BLD-MCNC: 10.8 G/DL (ref 11.5–15.5)
MCH RBC QN AUTO: 29.5 PG (ref 27–31)
MCHC RBC AUTO-ENTMCNC: 31.7 G/DL (ref 32–36)
MCV RBC AUTO: 93.3 FL (ref 80–99)
PLATELET # BLD AUTO: 233 10*3/MM3 (ref 150–450)
PMV BLD AUTO: 11.1 FL (ref 6–12)
POTASSIUM BLD-SCNC: 4.4 MMOL/L (ref 3.5–5.5)
RBC # BLD AUTO: 3.42 10*6/MM3 (ref 3.89–5.14)
SODIUM BLD-SCNC: 136 MMOL/L (ref 132–146)
WBC NRBC COR # BLD: 10.44 10*3/MM3 (ref 3.5–10.8)

## 2018-11-28 PROCEDURE — 4A023N7 MEASUREMENT OF CARDIAC SAMPLING AND PRESSURE, LEFT HEART, PERCUTANEOUS APPROACH: ICD-10-PCS | Performed by: INTERNAL MEDICINE

## 2018-11-28 PROCEDURE — 80048 BASIC METABOLIC PNL TOTAL CA: CPT | Performed by: HOSPITALIST

## 2018-11-28 PROCEDURE — 92928 PRQ TCAT PLMT NTRAC ST 1 LES: CPT | Performed by: INTERNAL MEDICINE

## 2018-11-28 PROCEDURE — C1725 CATH, TRANSLUMIN NON-LASER: HCPCS | Performed by: INTERNAL MEDICINE

## 2018-11-28 PROCEDURE — C1894 INTRO/SHEATH, NON-LASER: HCPCS | Performed by: INTERNAL MEDICINE

## 2018-11-28 PROCEDURE — 25010000002 MIDAZOLAM PER 1 MG: Performed by: INTERNAL MEDICINE

## 2018-11-28 PROCEDURE — C1769 GUIDE WIRE: HCPCS | Performed by: INTERNAL MEDICINE

## 2018-11-28 PROCEDURE — C1887 CATHETER, GUIDING: HCPCS | Performed by: INTERNAL MEDICINE

## 2018-11-28 PROCEDURE — 99231 SBSQ HOSP IP/OBS SF/LOW 25: CPT | Performed by: PHYSICIAN ASSISTANT

## 2018-11-28 PROCEDURE — 73610 X-RAY EXAM OF ANKLE: CPT

## 2018-11-28 PROCEDURE — C1874 STENT, COATED/COV W/DEL SYS: HCPCS | Performed by: INTERNAL MEDICINE

## 2018-11-28 PROCEDURE — 0 IOPAMIDOL PER 1 ML: Performed by: INTERNAL MEDICINE

## 2018-11-28 PROCEDURE — 85027 COMPLETE CBC AUTOMATED: CPT | Performed by: HOSPITALIST

## 2018-11-28 PROCEDURE — 25010000002 FENTANYL CITRATE (PF) 100 MCG/2ML SOLUTION: Performed by: INTERNAL MEDICINE

## 2018-11-28 PROCEDURE — 0270356 DILATION OF CORONARY ARTERY, ONE ARTERY, BIFURCATION, WITH TWO DRUG-ELUTING INTRALUMINAL DEVICES, PERCUTANEOUS APPROACH: ICD-10-PCS | Performed by: INTERNAL MEDICINE

## 2018-11-28 PROCEDURE — C1760 CLOSURE DEV, VASC: HCPCS | Performed by: INTERNAL MEDICINE

## 2018-11-28 PROCEDURE — 85014 HEMATOCRIT: CPT | Performed by: INTERNAL MEDICINE

## 2018-11-28 PROCEDURE — 85018 HEMOGLOBIN: CPT | Performed by: INTERNAL MEDICINE

## 2018-11-28 PROCEDURE — 63710000001 INSULIN DETEMIR PER 5 UNITS: Performed by: INTERNAL MEDICINE

## 2018-11-28 PROCEDURE — 99233 SBSQ HOSP IP/OBS HIGH 50: CPT | Performed by: HOSPITALIST

## 2018-11-28 PROCEDURE — 82962 GLUCOSE BLOOD TEST: CPT

## 2018-11-28 PROCEDURE — C9600 PERC DRUG-EL COR STENT SING: HCPCS | Performed by: INTERNAL MEDICINE

## 2018-11-28 PROCEDURE — 027034Z DILATION OF CORONARY ARTERY, ONE ARTERY WITH DRUG-ELUTING INTRALUMINAL DEVICE, PERCUTANEOUS APPROACH: ICD-10-PCS | Performed by: INTERNAL MEDICINE

## 2018-11-28 PROCEDURE — 25010000002 MORPHINE PER 10 MG: Performed by: INTERNAL MEDICINE

## 2018-11-28 PROCEDURE — 85347 COAGULATION TIME ACTIVATED: CPT

## 2018-11-28 PROCEDURE — B2111ZZ FLUOROSCOPY OF MULTIPLE CORONARY ARTERIES USING LOW OSMOLAR CONTRAST: ICD-10-PCS | Performed by: INTERNAL MEDICINE

## 2018-11-28 PROCEDURE — 25010000002 ONDANSETRON PER 1 MG: Performed by: INTERNAL MEDICINE

## 2018-11-28 PROCEDURE — 25010000002 HEPARIN (PORCINE) PER 1000 UNITS: Performed by: INTERNAL MEDICINE

## 2018-11-28 DEVICE — IMPLANTABLE DEVICE: Type: IMPLANTABLE DEVICE | Status: FUNCTIONAL

## 2018-11-28 DEVICE — XIENCE SIERRA™ EVEROLIMUS ELUTING CORONARY STENT SYSTEM 3.50 MM X 18 MM / RAPID-EXCHANGE
Type: IMPLANTABLE DEVICE | Status: FUNCTIONAL
Brand: XIENCE SIERRA™

## 2018-11-28 RX ORDER — HEPARIN SODIUM 1000 [USP'U]/ML
INJECTION, SOLUTION INTRAVENOUS; SUBCUTANEOUS AS NEEDED
Status: DISCONTINUED | OUTPATIENT
Start: 2018-11-28 | End: 2018-11-28 | Stop reason: HOSPADM

## 2018-11-28 RX ORDER — FENTANYL CITRATE 50 UG/ML
INJECTION, SOLUTION INTRAMUSCULAR; INTRAVENOUS AS NEEDED
Status: DISCONTINUED | OUTPATIENT
Start: 2018-11-28 | End: 2018-11-28 | Stop reason: HOSPADM

## 2018-11-28 RX ORDER — SODIUM CHLORIDE 9 MG/ML
INJECTION, SOLUTION INTRAVENOUS CONTINUOUS PRN
Status: COMPLETED | OUTPATIENT
Start: 2018-11-28 | End: 2018-11-28

## 2018-11-28 RX ORDER — MIDAZOLAM HYDROCHLORIDE 1 MG/ML
INJECTION INTRAMUSCULAR; INTRAVENOUS AS NEEDED
Status: DISCONTINUED | OUTPATIENT
Start: 2018-11-28 | End: 2018-11-28 | Stop reason: HOSPADM

## 2018-11-28 RX ORDER — SODIUM CHLORIDE 9 MG/ML
1 INJECTION, SOLUTION INTRAVENOUS CONTINUOUS
Status: ACTIVE | OUTPATIENT
Start: 2018-11-28 | End: 2018-11-28

## 2018-11-28 RX ORDER — CLOPIDOGREL BISULFATE 75 MG/1
TABLET ORAL AS NEEDED
Status: DISCONTINUED | OUTPATIENT
Start: 2018-11-28 | End: 2018-11-28 | Stop reason: HOSPADM

## 2018-11-28 RX ORDER — LIDOCAINE HYDROCHLORIDE 10 MG/ML
INJECTION, SOLUTION EPIDURAL; INFILTRATION; INTRACAUDAL; PERINEURAL AS NEEDED
Status: DISCONTINUED | OUTPATIENT
Start: 2018-11-28 | End: 2018-11-28 | Stop reason: HOSPADM

## 2018-11-28 RX ADMIN — MORPHINE SULFATE 30 MG: 30 TABLET, EXTENDED RELEASE ORAL at 23:58

## 2018-11-28 RX ADMIN — MONTELUKAST SODIUM 10 MG: 10 TABLET, COATED ORAL at 21:35

## 2018-11-28 RX ADMIN — GUAIFENESIN 600 MG: 600 TABLET, EXTENDED RELEASE ORAL at 21:35

## 2018-11-28 RX ADMIN — FEXOFENADINE HCL 180 MG: 180 TABLET ORAL at 23:40

## 2018-11-28 RX ADMIN — PANTOPRAZOLE SODIUM 40 MG: 40 TABLET, DELAYED RELEASE ORAL at 16:53

## 2018-11-28 RX ADMIN — NITROFURANTOIN (MACROCRYSTALS) 50 MG: 50 CAPSULE ORAL at 23:58

## 2018-11-28 RX ADMIN — RANOLAZINE 500 MG: 500 TABLET, FILM COATED, EXTENDED RELEASE ORAL at 21:35

## 2018-11-28 RX ADMIN — SODIUM CHLORIDE, PRESERVATIVE FREE 3 ML: 5 INJECTION INTRAVENOUS at 23:40

## 2018-11-28 RX ADMIN — INSULIN LISPRO 3 UNITS: 100 INJECTION, SOLUTION INTRAVENOUS; SUBCUTANEOUS at 21:33

## 2018-11-28 RX ADMIN — MORPHINE SULFATE 1 MG: 2 INJECTION, SOLUTION INTRAMUSCULAR; INTRAVENOUS at 05:12

## 2018-11-28 RX ADMIN — ONDANSETRON 4 MG: 2 INJECTION INTRAMUSCULAR; INTRAVENOUS at 20:06

## 2018-11-28 RX ADMIN — INSULIN LISPRO 4 UNITS: 100 INJECTION, SOLUTION INTRAVENOUS; SUBCUTANEOUS at 17:03

## 2018-11-28 RX ADMIN — DOCUSATE SODIUM 100 MG: 100 CAPSULE, LIQUID FILLED ORAL at 21:34

## 2018-11-28 RX ADMIN — INSULIN DETEMIR 12 UNITS: 100 INJECTION, SOLUTION SUBCUTANEOUS at 21:32

## 2018-11-28 RX ADMIN — CARVEDILOL 3.12 MG: 3.12 TABLET, FILM COATED ORAL at 21:34

## 2018-11-28 RX ADMIN — MORPHINE SULFATE 15 MG: 30 TABLET ORAL at 17:03

## 2018-11-28 RX ADMIN — CYCLOSPORINE 1 DROP: 0.5 EMULSION OPHTHALMIC at 23:59

## 2018-11-28 RX ADMIN — ONDANSETRON 4 MG: 2 INJECTION INTRAMUSCULAR; INTRAVENOUS at 05:09

## 2018-11-28 RX ADMIN — GABAPENTIN 400 MG: 400 CAPSULE ORAL at 21:35

## 2018-11-28 RX ADMIN — MORPHINE SULFATE 1 MG: 2 INJECTION, SOLUTION INTRAMUSCULAR; INTRAVENOUS at 14:02

## 2018-11-28 RX ADMIN — TIZANIDINE 4 MG: 4 TABLET ORAL at 23:39

## 2018-11-28 RX ADMIN — INSULIN LISPRO 3 UNITS: 100 INJECTION, SOLUTION INTRAVENOUS; SUBCUTANEOUS at 17:03

## 2018-11-28 RX ADMIN — DIAZEPAM 2 MG: 2 TABLET ORAL at 23:39

## 2018-11-28 RX ADMIN — CARBAMAZEPINE 50 MG: 200 TABLET ORAL at 23:58

## 2018-11-28 RX ADMIN — ONDANSETRON 4 MG: 2 INJECTION INTRAMUSCULAR; INTRAVENOUS at 13:31

## 2018-11-28 RX ADMIN — MORPHINE SULFATE 1 MG: 2 INJECTION, SOLUTION INTRAMUSCULAR; INTRAVENOUS at 20:07

## 2018-11-28 RX ADMIN — ATORVASTATIN CALCIUM 80 MG: 40 TABLET, FILM COATED ORAL at 21:35

## 2018-11-29 ENCOUNTER — APPOINTMENT (OUTPATIENT)
Dept: GENERAL RADIOLOGY | Facility: HOSPITAL | Age: 83
End: 2018-11-29

## 2018-11-29 VITALS
WEIGHT: 123.9 LBS | BODY MASS INDEX: 23.39 KG/M2 | HEART RATE: 101 BPM | OXYGEN SATURATION: 93 % | SYSTOLIC BLOOD PRESSURE: 145 MMHG | RESPIRATION RATE: 16 BRPM | TEMPERATURE: 98.3 F | HEIGHT: 61 IN | DIASTOLIC BLOOD PRESSURE: 64 MMHG

## 2018-11-29 PROBLEM — I20.0 UNSTABLE ANGINA (HCC): Status: RESOLVED | Noted: 2018-11-21 | Resolved: 2018-11-29

## 2018-11-29 LAB
ANION GAP SERPL CALCULATED.3IONS-SCNC: 4 MMOL/L (ref 3–11)
BUN BLD-MCNC: 16 MG/DL (ref 9–23)
BUN/CREAT SERPL: 25 (ref 7–25)
CALCIUM SPEC-SCNC: 9.3 MG/DL (ref 8.7–10.4)
CHLORIDE SERPL-SCNC: 97 MMOL/L (ref 99–109)
CO2 SERPL-SCNC: 32 MMOL/L (ref 20–31)
CREAT BLD-MCNC: 0.64 MG/DL (ref 0.6–1.3)
DEPRECATED RDW RBC AUTO: 47.1 FL (ref 37–54)
ERYTHROCYTE [DISTWIDTH] IN BLOOD BY AUTOMATED COUNT: 13.7 % (ref 11.3–14.5)
GFR SERPL CREATININE-BSD FRML MDRD: 89 ML/MIN/1.73
GLUCOSE BLD-MCNC: 173 MG/DL (ref 70–100)
GLUCOSE BLDC GLUCOMTR-MCNC: 170 MG/DL (ref 70–130)
GLUCOSE BLDC GLUCOMTR-MCNC: 299 MG/DL (ref 70–130)
HCT VFR BLD AUTO: 28.6 % (ref 34.5–44)
HGB BLD-MCNC: 9.1 G/DL (ref 11.5–15.5)
MCH RBC QN AUTO: 29.9 PG (ref 27–31)
MCHC RBC AUTO-ENTMCNC: 31.8 G/DL (ref 32–36)
MCV RBC AUTO: 94.1 FL (ref 80–99)
PLATELET # BLD AUTO: 241 10*3/MM3 (ref 150–450)
PMV BLD AUTO: 11.4 FL (ref 6–12)
POTASSIUM BLD-SCNC: 4.4 MMOL/L (ref 3.5–5.5)
RBC # BLD AUTO: 3.04 10*6/MM3 (ref 3.89–5.14)
SODIUM BLD-SCNC: 133 MMOL/L (ref 132–146)
WBC NRBC COR # BLD: 10.76 10*3/MM3 (ref 3.5–10.8)

## 2018-11-29 PROCEDURE — 99231 SBSQ HOSP IP/OBS SF/LOW 25: CPT | Performed by: INTERNAL MEDICINE

## 2018-11-29 PROCEDURE — 74240 X-RAY XM UPR GI TRC 1CNTRST: CPT

## 2018-11-29 PROCEDURE — 92611 MOTION FLUOROSCOPY/SWALLOW: CPT

## 2018-11-29 PROCEDURE — 85027 COMPLETE CBC AUTOMATED: CPT | Performed by: INTERNAL MEDICINE

## 2018-11-29 PROCEDURE — 74230 X-RAY XM SWLNG FUNCJ C+: CPT

## 2018-11-29 PROCEDURE — 82962 GLUCOSE BLOOD TEST: CPT

## 2018-11-29 PROCEDURE — 80048 BASIC METABOLIC PNL TOTAL CA: CPT | Performed by: INTERNAL MEDICINE

## 2018-11-29 PROCEDURE — 25010000002 MORPHINE PER 10 MG: Performed by: INTERNAL MEDICINE

## 2018-11-29 PROCEDURE — 99239 HOSP IP/OBS DSCHRG MGMT >30: CPT | Performed by: HOSPITALIST

## 2018-11-29 PROCEDURE — 97162 PT EVAL MOD COMPLEX 30 MIN: CPT

## 2018-11-29 RX ORDER — LOSARTAN POTASSIUM 50 MG/1
50 TABLET ORAL DAILY
Qty: 30 TABLET | Refills: 0 | Status: SHIPPED | OUTPATIENT
Start: 2018-11-29 | End: 2018-11-29 | Stop reason: SDUPTHER

## 2018-11-29 RX ORDER — RANOLAZINE 500 MG/1
500 TABLET, EXTENDED RELEASE ORAL EVERY 12 HOURS SCHEDULED
Qty: 60 TABLET | Refills: 0 | Status: SHIPPED | OUTPATIENT
Start: 2018-11-29 | End: 2018-12-29

## 2018-11-29 RX ORDER — RANOLAZINE 500 MG/1
500 TABLET, EXTENDED RELEASE ORAL EVERY 12 HOURS SCHEDULED
Qty: 60 TABLET | Refills: 0 | Status: SHIPPED | OUTPATIENT
Start: 2018-11-29 | End: 2018-11-29

## 2018-11-29 RX ORDER — AMLODIPINE BESYLATE 5 MG/1
5 TABLET ORAL
Qty: 30 TABLET | Refills: 0 | Status: SHIPPED | OUTPATIENT
Start: 2018-11-30 | End: 2020-01-01 | Stop reason: SDUPTHER

## 2018-11-29 RX ORDER — AMLODIPINE BESYLATE 5 MG/1
5 TABLET ORAL
Qty: 30 TABLET | Refills: 0 | Status: SHIPPED | OUTPATIENT
Start: 2018-11-30 | End: 2018-11-29

## 2018-11-29 RX ORDER — LACTULOSE 10 G/15ML
30 SOLUTION ORAL ONCE
Status: COMPLETED | OUTPATIENT
Start: 2018-11-29 | End: 2018-11-29

## 2018-11-29 RX ORDER — BISACODYL 5 MG/1
10 TABLET, DELAYED RELEASE ORAL DAILY PRN
Status: DISCONTINUED | OUTPATIENT
Start: 2018-11-29 | End: 2018-11-29 | Stop reason: HOSPADM

## 2018-11-29 RX ORDER — LOSARTAN POTASSIUM 50 MG/1
50 TABLET ORAL DAILY
Qty: 30 TABLET | Refills: 0 | Status: SHIPPED | OUTPATIENT
Start: 2018-11-29 | End: 2020-01-01 | Stop reason: SDUPTHER

## 2018-11-29 RX ADMIN — SODIUM CHLORIDE, PRESERVATIVE FREE 3 ML: 5 INJECTION INTRAVENOUS at 09:41

## 2018-11-29 RX ADMIN — BARIUM SULFATE 50 ML: 400 SUSPENSION ORAL at 10:45

## 2018-11-29 RX ADMIN — BISACODYL 10 MG: 5 TABLET, COATED ORAL at 05:30

## 2018-11-29 RX ADMIN — BARIUM SULFATE 20 ML: 400 PASTE ORAL at 10:45

## 2018-11-29 RX ADMIN — INSULIN LISPRO 4 UNITS: 100 INJECTION, SOLUTION INTRAVENOUS; SUBCUTANEOUS at 12:34

## 2018-11-29 RX ADMIN — GABAPENTIN 400 MG: 400 CAPSULE ORAL at 09:38

## 2018-11-29 RX ADMIN — GUAIFENESIN 600 MG: 600 TABLET, EXTENDED RELEASE ORAL at 09:38

## 2018-11-29 RX ADMIN — FEXOFENADINE HCL 180 MG: 180 TABLET ORAL at 09:38

## 2018-11-29 RX ADMIN — LACTULOSE 30 ML: 10 SOLUTION ORAL at 12:33

## 2018-11-29 RX ADMIN — PANTOPRAZOLE SODIUM 40 MG: 40 TABLET, DELAYED RELEASE ORAL at 05:30

## 2018-11-29 RX ADMIN — RANOLAZINE 500 MG: 500 TABLET, FILM COATED, EXTENDED RELEASE ORAL at 09:38

## 2018-11-29 RX ADMIN — AMLODIPINE BESYLATE 5 MG: 5 TABLET ORAL at 09:38

## 2018-11-29 RX ADMIN — INSULIN LISPRO 4 UNITS: 100 INJECTION, SOLUTION INTRAVENOUS; SUBCUTANEOUS at 09:45

## 2018-11-29 RX ADMIN — CARBAMAZEPINE 50 MG: 200 TABLET ORAL at 09:38

## 2018-11-29 RX ADMIN — CLOPIDOGREL 75 MG: 75 TABLET, FILM COATED ORAL at 09:37

## 2018-11-29 RX ADMIN — CYCLOSPORINE 1 DROP: 0.5 EMULSION OPHTHALMIC at 09:39

## 2018-11-29 RX ADMIN — CARVEDILOL 3.12 MG: 3.12 TABLET, FILM COATED ORAL at 09:38

## 2018-11-29 RX ADMIN — MORPHINE SULFATE 30 MG: 30 TABLET, EXTENDED RELEASE ORAL at 12:33

## 2018-11-29 RX ADMIN — BARIUM SULFATE 100 ML: 0.81 POWDER, FOR SUSPENSION ORAL at 10:46

## 2018-11-29 RX ADMIN — OXYBUTYNIN CHLORIDE 10 MG: 5 TABLET, EXTENDED RELEASE ORAL at 09:39

## 2018-11-29 RX ADMIN — MORPHINE SULFATE 1 MG: 2 INJECTION, SOLUTION INTRAMUSCULAR; INTRAVENOUS at 05:30

## 2018-11-29 RX ADMIN — BARIUM SULFATE 50 ML: 400 SUSPENSION ORAL at 10:47

## 2018-11-29 RX ADMIN — INSULIN LISPRO 2 UNITS: 100 INJECTION, SOLUTION INTRAVENOUS; SUBCUTANEOUS at 09:45

## 2018-11-29 RX ADMIN — DOCUSATE SODIUM 100 MG: 100 CAPSULE, LIQUID FILLED ORAL at 09:38

## 2018-11-29 RX ADMIN — ASPIRIN 81 MG: 81 TABLET, COATED ORAL at 09:38

## 2018-11-30 ENCOUNTER — READMISSION MANAGEMENT (OUTPATIENT)
Dept: CALL CENTER | Facility: HOSPITAL | Age: 83
End: 2018-11-30

## 2018-11-30 NOTE — OUTREACH NOTE
Prep Survey      Responses   Facility patient discharged from?  [place]   Is patient eligible?  Yes   Discharge diagnosis  Chest pain-s/p heart cath with stenting   Does the patient have one of the following disease processes/diagnoses(primary or secondary)?  Other   What is the Home health agency?   [facility]   Is there a DME ordered?  No   Prep survey completed?  Yes          Ashley Burton RN

## 2018-12-01 ENCOUNTER — READMISSION MANAGEMENT (OUTPATIENT)
Dept: CALL CENTER | Facility: HOSPITAL | Age: 83
End: 2018-12-01

## 2018-12-01 NOTE — OUTREACH NOTE
Medical Week 1 Survey      Responses   Facility patient discharged from?  [place]   Does the patient have one of the following disease processes/diagnoses(primary or secondary)?  Other   Is there a successful TCM telephone encounter documented?  No   Week 1 attempt successful?  Yes   Call start time  1550   Call end time  1554   Is patient permission given to speak with other caregiver?  Yes   List who call center can speak with  mir Gates reviewed with patient/caregiver?  Yes   Is the patient taking all medications as directed (includes completed medication regime)?  Yes   Comments regarding appointments  has an appointment on the 7th   Does the patient have a primary care provider?   Yes   Does the patient have an appointment with their PCP within 7 days of discharge?  Yes   Has the patient kept scheduled appointments due by today?  N/A   What is the Home health agency?   [facility]   Has home health visited the patient within 72 hours of discharge?  Yes   Did the patient receive a copy of their discharge instructions?  Yes   Nursing interventions  Educated on MyChart   What is the patient's perception of their health status since discharge?  Improving   Is the patient/caregiver able to teach back signs and symptoms related to disease process for when to call PCP?  Yes   Is the patient/caregiver able to teach back signs and symptoms related to disease process for when to call 911?  Yes   Is the patient/caregiver able to teach back the hierarchy of who to call/visit for symptoms/problems? PCP, Specialist, Home health nurse, Urgent Care, ED, 911  Yes   Week 1 call completed?  Yes   Wrap up additional comments  feels like she  is improving          Anuradha Marks RN

## 2018-12-08 ENCOUNTER — READMISSION MANAGEMENT (OUTPATIENT)
Dept: CALL CENTER | Facility: HOSPITAL | Age: 83
End: 2018-12-08

## 2018-12-08 NOTE — OUTREACH NOTE
Medical Week 2 Survey      Responses   Facility patient discharged from?  [place]   Does the patient have one of the following disease processes/diagnoses(primary or secondary)?  Other   Week 2 attempt successful?  No   Unsuccessful attempts  Attempt 1          Jennie Johnson RN

## 2018-12-09 ENCOUNTER — READMISSION MANAGEMENT (OUTPATIENT)
Dept: CALL CENTER | Facility: HOSPITAL | Age: 83
End: 2018-12-09

## 2018-12-09 NOTE — OUTREACH NOTE
Medical Week 2 Survey      Responses   Facility patient discharged from?  [place]   Does the patient have one of the following disease processes/diagnoses(primary or secondary)?  Other   Week 2 attempt successful?  Yes   Call start time  1207   Discharge diagnosis  Chest pain-s/p heart cath with stenting   Call end time  1213   Is patient permission given to speak with other caregiver?  Yes   List who call center can speak with  Yajaira, daughter   Person spoke with today (if not patient) and relationship  Yajaira, daughter   Meds reviewed with patient/caregiver?  Yes   Is the patient having any side effects they believe may be caused by any medication additions or changes?  No   Does the patient have all medications ordered at discharge?  Yes   Is the patient taking all medications as directed (includes completed medication regime)?  Yes   Does the patient have a primary care provider?   Yes   Does the patient have an appointment with their PCP within 7 days of discharge?  Yes   Has the patient kept scheduled appointments due by today?  Yes   What is the Home health agency?   [facility] health   Psychosocial issues?  No   Did the patient receive a copy of their discharge instructions?  Yes   Nursing interventions  Reviewed instructions with patient, Educated on MyChart   What is the patient's perception of their health status since discharge?  New symptoms unrelated to diagnosis [both legs swollen very bad]   Is the patient/caregiver able to teach back signs and symptoms related to disease process for when to call PCP?  Yes   Is the patient/caregiver able to teach back signs and symptoms related to disease process for when to call 911?  Yes   Is the patient/caregiver able to teach back the hierarchy of who to call/visit for symptoms/problems? PCP, Specialist, Home health nurse, Urgent Care, ED, 911  Yes   Week 2 Call Completed?  Yes          Pauline Coffman RN

## 2018-12-10 ENCOUNTER — TELEPHONE (OUTPATIENT)
Dept: CARDIOLOGY | Facility: CLINIC | Age: 83
End: 2018-12-10

## 2018-12-10 NOTE — TELEPHONE ENCOUNTER
Pts daughter called stating she has significant BLE edema, increased SOA requiring increased use of oxygen. We advised she been seen in [facility] for possible IV diuresis and keep follow up with Dr. Whitley this week. Appt made in [facility] for tomorrow morning. Pt agreeable to this. All questions answered.

## 2018-12-10 NOTE — TELEPHONE ENCOUNTER
Patients daughter called and stated that he mom has edema in legs and she has an extensive heart history and wants patient to be seen. Informed her that her mom has an appt to see Dr Whitley on Monday. She stated that is not soon enough and she may take her to ER. Recommended seeing pcp for labs and eval and she stated she does not trust pcp. Offered to get appt at [facility] and she stated she already had on for tomorrow that Suzanna set up. Advised her to keep that appt with [facility] as they can address her swelling.

## 2018-12-10 NOTE — PROGRESS NOTES
[facility]  [facility]      Encounter Date:12/11/2018     Fani Bird  [address]  [place] [address]  [unfilled]    1935    Emerson Alvarez MD    Fani Bird is a 82 y.o. female.      Subjective:     Chief Complaint:  Swelling     HPI   Patient is an 81 YO F with past medical history significant for CAD, TIA/CVA, HTN, HLP, GERD, DM, and rhabdomyolysis who presents to the [facility] for evaluation of swelling. Patient recently admitted at the end of November for NSTEMI. Patient with known complex CAD and CABG has been entertained but thought to be too high risk. She had a high risk intervention on 11/28 with successful PCI of the left main bifurcation. Echo 11/10 showed normal LVEF. Patient reports since Friday she noticed worsening LE and abdominal swelling associated with increased dyspnea, especially when laying down. She has also had wheezing. No chest pain, no fever. She does have productive cough but this is chronic for her secondary to allergies/asthma    Patient Active Problem List   Diagnosis   • Cerebrovascular disease   • Coronary artery disease   • Hypertension   • Dyslipidemia   • GERD (gastroesophageal reflux disease)   • Vitamin D deficiency   • Diabetes mellitus (CMS/HCC)   • Dyspnea   • Ankle pain, left   • CAD (coronary artery disease)   • Chronic pain       Past Medical History:   Diagnosis Date   • Cataract    • Cerebrovascular disease    • Chest pain    • Coronary artery disease     no recent ischemic evaluation   • Diabetes mellitus (CMS/HCC)     Hemoglobin A1c of 8.3.   • Dyslipidemia     May 2012 - Total 156, triglycerides 297, HDL 47, and LDL 54.   • Dyspnea    • GERD (gastroesophageal reflux disease)    • Hypertension    • Pneumonia    • Swelling of lower extremity     improved with stockings   • Vitamin D deficiency        Past Surgical History:   Procedure Laterality Date   • BLADDER REPAIR     • BREAST BIOPSY     • BREAST CYST  ASPIRATION     • CARDIAC CATHETERIZATION     • CARPAL TUNNEL RELEASE Bilateral    • CATARACT EXTRACTION     • CHOLECYSTECTOMY     • CORONARY STENT PLACEMENT     • HYSTERECTOMY     • SPINAL FUSION         Family History   Problem Relation Age of Onset   • Heart disease Mother    • Hypertension Mother    • Heart disease Father    • Hypertension Father    • Heart attack Father    • Diabetes Father    • Diabetes Brother    • Liver cancer Brother    • Hypertension Brother    • Stroke Maternal Grandmother    • Heart attack Maternal Grandmother    • Stroke Maternal Grandfather    • No Known Problems Paternal Grandmother    • No Known Problems Paternal Grandfather        Social History     Socioeconomic History   • Marital status:      Spouse name: Not on file   • Number of children: Not on file   • Years of education: Not on file   • Highest education level: Not on file   Social Needs   • Financial resource strain: Not on file   • Food insecurity - worry: Not on file   • Food insecurity - inability: Not on file   • Transportation needs - medical: Not on file   • Transportation needs - non-medical: Not on file   Occupational History   • Occupation: retired   Tobacco Use   • Smoking status: Never Smoker   • Smokeless tobacco: Never Used   Substance and Sexual Activity   • Alcohol use: No   • Drug use: No   • Sexual activity: Defer   Other Topics Concern   • Not on file   Social History Narrative    Caffeine: 3 servings per day    Patient lives with her daughther       Allergies   Allergen Reactions   • Isosorbide Hives   • Augmentin [Amoxicillin-Pot Clavulanate] Diarrhea and Rash         Current Outpatient Medications:   •  albuterol (PROVENTIL) (2.5 MG/3ML) 0.083% nebulizer solution, Take 2.5 mg by nebulization Every 4 (Four) Hours As Needed., Disp: , Rfl:   •  albuterol (VENTOLIN HFA) 108 (90 BASE) MCG/ACT inhaler, Inhale 1 puff 2 (Two) Times a Day. Before taking Flovent, Disp: , Rfl:   •  amLODIPine (NORVASC) 5 MG  tablet, Take 1 tablet by mouth Daily., Disp: 30 tablet, Rfl: 0  •  ascorbic acid (VITAMIN C) 1000 MG tablet, Take 1 tablet by mouth Daily., Disp: , Rfl:   •  aspirin 81 MG tablet, Take 81 mg by mouth Daily., Disp: , Rfl:   •  atorvastatin (LIPITOR) 80 MG tablet, Take 1 tablet by mouth Daily., Disp: 30 tablet, Rfl: 3  •  carBAMazepine (TEGRETOL) 200 MG tablet, Take 100 mg by mouth 2 (Two) Times a Day., Disp: , Rfl:   •  carvedilol (COREG) 3.125 MG tablet, Take 1 tablet by mouth Every 12 (Twelve) Hours., Disp: 60 tablet, Rfl: 3  •  celecoxib (CELEBREX) 200 MG capsule, Take 200 mg by mouth Daily., Disp: , Rfl:   •  chlorthalidone (HYGROTON) 25 MG tablet, Take 1 tablet by mouth Daily., Disp: 90 tablet, Rfl: 2  •  Cholecalciferol (VITAMIN D PO), Take 1,000 Units by mouth Daily., Disp: , Rfl:   •  clopidogrel (PLAVIX) 75 MG tablet, Take 1 tablet by mouth Daily., Disp: 30 tablet, Rfl: 3  •  Cranberry 1000 MG capsule, Take 1 capsule by mouth Daily., Disp: , Rfl:   •  Cyanocobalamin (VITAMIN B-12 PO), Take 1 tablet by mouth 1 (One) Time Per Week. Taken on Saturday, Disp: , Rfl:   •  cycloSPORINE (RESTASIS) 0.05 % ophthalmic emulsion, Apply 1 drop to eye(s) as directed by provider 2 (Two) Times a Day., Disp: , Rfl:   •  diazepam (VALIUM) 2 MG tablet, Take 2 mg by mouth Every Night., Disp: , Rfl:   •  docusate sodium (COLACE) 100 MG capsule, Take 100 mg by mouth 2 (Two) Times a Day., Disp: , Rfl:   •  estropipate (OGEN) 1.5 MG tablet, Take 1.5 mg by mouth Daily., Disp: , Rfl:   •  ferrous sulfate 325 (65 FE) MG tablet, Take 325 mg by mouth Daily With Breakfast & Dinner. 2 daily, Disp: , Rfl:   •  fexofenadine (ALLEGRA) 180 MG tablet, Take 180 mg by mouth 2 (Two) Times a Day., Disp: , Rfl:   •  fluticasone (FLOVENT HFA) 220 MCG/ACT inhaler, Inhale 1 puff 2 (Two) Times a Day., Disp: , Rfl:   •  gabapentin (NEURONTIN) 400 MG capsule, Take 400 mg by mouth 4 (Four) Times a Day., Disp: , Rfl:   •  glucosamine-chondroitin 500-400 MG  capsule capsule, Take 2 capsules by mouth Daily., Disp: , Rfl:   •  guaiFENesin 200 MG tablet, Take 200 mg by mouth Every Night., Disp: , Rfl:   •  insulin aspart prot-insulin aspart (novoLOG 70/30) (70-30) 100 UNIT/ML injection, Inject 24 Units under the skin into the appropriate area as directed Every Morning Before Breakfast., Disp: , Rfl:   •  insulin aspart prot-insulin aspart (novoLOG 70/30) (70-30) 100 UNIT/ML injection, Inject 22 Units under the skin into the appropriate area as directed Daily Before Supper., Disp: , Rfl:   •  losartan (COZAAR) 50 MG tablet, Take 1 tablet by mouth Daily., Disp: 30 tablet, Rfl: 0  •  mometasone (NASONEX) 50 MCG/ACT nasal spray, 2 sprays into the nostril(s) as directed by provider Every Evening., Disp: , Rfl:   •  montelukast (SINGULAIR) 10 MG tablet, Take 10 mg by mouth Every Night., Disp: , Rfl:   •  Morphine (MS CONTIN) 30 MG 12 hr tablet, Take 30 mg by mouth 2 (Two) Times a Day., Disp: , Rfl:   •  Morphine (MSIR) 15 MG tablet, Take 15 mg by mouth 2 (Two) Times a Day As Needed for Severe Pain ., Disp: , Rfl:   •  Multiple Vitamins-Minerals (ICAPS AREDS 2 PO), Take 1 capsule by mouth Daily., Disp: , Rfl:   •  nitrofurantoin (MACRODANTIN) 50 MG capsule, Take 50 mg by mouth Every Night., Disp: , Rfl:   •  nitroglycerin (NITROSTAT) 0.4 MG SL tablet, Place 1 tablet under the tongue Every 5 (Five) Minutes As Needed for Chest Pain., Disp: 25 tablet, Rfl: 3  •  Omega-3 Fatty Acids (FISH OIL PO), Take 1,000 mg by mouth Daily., Disp: , Rfl:   •  pantoprazole (PROTONIX) 40 MG EC tablet, Take 40 mg by mouth 2 (Two) Times a Day., Disp: , Rfl:   •  ranolazine (RANEXA) 500 MG 12 hr tablet, Take 1 tablet by mouth Every 12 (Twelve) Hours for 30 days., Disp: 60 tablet, Rfl: 0  •  tiZANidine (ZANAFLEX) 4 MG tablet, Take 4 mg by mouth Every Night., Disp: , Rfl:   •  tolterodine LA (DETROL LA) 4 MG 24 hr capsule, Take 1 capsule by mouth daily., Disp: , Rfl:     The following portions of the  "patient's history were reviewed and updated as appropriate: allergies, current medications, past family history, past medical history, past social history, past surgical history and problem list.    Review of Systems   Constitution: Positive for weakness and malaise/fatigue. Negative for chills and fever.   HENT: Positive for congestion and nosebleeds.    Eyes: Negative.    Cardiovascular: Positive for dyspnea on exertion, irregular heartbeat, leg swelling, orthopnea, palpitations and paroxysmal nocturnal dyspnea. Negative for chest pain, claudication, cyanosis, near-syncope and syncope.   Respiratory: Positive for shortness of breath and sputum production. Negative for cough and snoring.    Endocrine: Negative.    Hematologic/Lymphatic: Does not bruise/bleed easily.   Skin: Negative for poor wound healing.   Musculoskeletal: Positive for falls, joint pain, muscle cramps and muscle weakness.   Gastrointestinal: Positive for bloating, constipation, dysphagia, heartburn, nausea and vomiting. Negative for abdominal pain, hematemesis and melena.   Genitourinary: Negative.  Negative for hematuria.   Neurological: Positive for headaches.   Psychiatric/Behavioral: The patient is nervous/anxious.    Allergic/Immunologic: Positive for environmental allergies.       Objective:     Vitals:    12/11/18 0948 12/11/18 0951   BP: (!) 195/77 178/78   BP Location: Right arm Left arm   Patient Position: Sitting Sitting   Pulse: 80    Resp: 18    Temp: 98.6 °F (37 °C)    TempSrc: Temporal    SpO2: 95%    Weight: 62.5 kg (137 lb 12.8 oz)    Height: 154.9 cm (60.98\")        Physical Exam   Constitutional: She is oriented to person, place, and time. She appears well-developed and well-nourished. No distress.   HENT:   Head: Normocephalic.   Eyes: Conjunctivae are normal. Pupils are equal, round, and reactive to light.   Neck: Neck supple. No JVD present. No thyromegaly present.   Cardiovascular: Normal rate, regular rhythm, normal heart " sounds and intact distal pulses. Exam reveals no gallop and no friction rub.   No murmur heard.  Pulmonary/Chest: Effort normal. No respiratory distress. She has wheezes. She has rales. She exhibits no tenderness.   Abdominal: Soft. Bowel sounds are normal. She exhibits distension.   Musculoskeletal: Normal range of motion. She exhibits edema (3+ BLE up to knees).   Neurological: She is alert and oriented to person, place, and time.   Skin: Skin is warm and dry.   Psychiatric: She has a normal mood and affect. Her behavior is normal. Thought content normal.   Vitals reviewed.      Lab and Diagnostic Review:  11/10/18  · Left ventricular systolic function is normal. Estimated EF = 60%.  · The following left ventricular wall segments are hypokinetic: basal inferolateral and mid inferolateral.  · Left ventricular diastolic dysfunction (grade I a) consistent with impaired relaxation.  11/28/18   · There was significant distal left main, significant proximal LAD, and a subtotal ostial circumflex stenosis.  These lesions are now status post intervention with a Triton bifurcation stent from the left main into the circumflex artery (3.5 mm main branch, 3.0 mm side branch) and a Xience Mary 3.5 x 18 mm drug-eluting stent from the left main extending into the LAD.  The stents were treated with a final kissing balloon inflation.    Lab Results   Component Value Date    GLUCOSE 176 (H) 12/11/2018    CALCIUM 9.3 12/11/2018     12/11/2018    K 4.7 12/11/2018    CO2 29.0 12/11/2018     12/11/2018    BUN 15 12/11/2018    CREATININE 0.71 12/11/2018    EGFRIFNONA 79 12/11/2018    BCR 21.1 12/11/2018    ANIONGAP 7.0 12/11/2018     Lab Results   Component Value Date    WBC 9.86 12/11/2018    HGB 9.4 (L) 12/11/2018    HCT 31.0 (L) 12/11/2018    MCV 97.5 12/11/2018     12/11/2018         Assessment and Plan:   1. Acute congestive heart failure, unspecified heart failure type (CMS/HCC)  - IV diuresis today in office.  Patient received 60 mg lasix today through a butterfly in the RFA over slow IV push. During IV diuresis, vitals were monitored and stable. Please see IV diuresis record for those vitals. Patient voided 650 ml in the office prior to discharge from the office. IV was d/c'd and area was free of erythema, ecchymosis, or drainage.Patient will receive a follow up call from the HF center in 24 hours to evaluate urinary output and reassess signs and symptoms.   - Will likely start lasix PO tomorrow pending labs, CXR and symptoms  - Heart failure education provided today including signs and symptoms, causes of heart failure, medications, daily weights, low sodium diet (less than 2000 mg per day).Reviewed HF Zones with patient and family.  - Likely repeat echo once euvolemic  - Comprehensive Metabolic Panel  - CBC & Differential  - BNP  - Magnesium  - CBC Auto Differential  - TSH; Future  - TSH    2. Coronary artery disease involving native coronary artery of native heart without angina pectoris  -s/p complex PCI  with a Triton bifurcation stent from the left main into the circumflex artery and a Xience Mary 3.5 x 18 mm drug-eluting stent from the left main extending into the LAD.  The stents were treated with a final kissing balloon inflation.  - No angina  - on DAPT, statin, BB    3. Essential hypertension  - Elevated today in the setting of FVO. Patient reports BP usually well contolled. HTN Education provided today including signs and symptoms, medication management, daily blood pressure monitoring. Patient encouraged to call the Heart and Valve center with any blood pressure consistently greater than 130/80      Keep follow up with Dr. Whitley on Monday. Will need repeat labs at that time      It has been a pleasure to participate in the care of this patient.  Patient was instructed to call the [facility] and Valve Center with any questions, concerns, or worsening symptoms.    *Please note that portions of this note were  completed with a voice recognition program. Efforts were made to edit the dictations, but occasionally words are mistranscribed.

## 2018-12-11 ENCOUNTER — HOSPITAL ENCOUNTER (OUTPATIENT)
Dept: GENERAL RADIOLOGY | Facility: HOSPITAL | Age: 83
Discharge: HOME | End: 2018-12-11
Attending: NURSE PRACTITIONER | Admitting: NURSE PRACTITIONER

## 2018-12-11 ENCOUNTER — OFFICE VISIT (OUTPATIENT)
Dept: CARDIOLOGY | Facility: HOSPITAL | Age: 83
End: 2018-12-11

## 2018-12-11 VITALS
RESPIRATION RATE: 18 BRPM | DIASTOLIC BLOOD PRESSURE: 78 MMHG | TEMPERATURE: 98.6 F | BODY MASS INDEX: 26.01 KG/M2 | HEIGHT: 61 IN | HEART RATE: 80 BPM | OXYGEN SATURATION: 95 % | WEIGHT: 137.8 LBS | SYSTOLIC BLOOD PRESSURE: 178 MMHG

## 2018-12-11 DIAGNOSIS — I50.9 ACUTE CONGESTIVE HEART FAILURE, UNSPECIFIED HEART FAILURE TYPE (HCC): Primary | ICD-10-CM

## 2018-12-11 DIAGNOSIS — I10 ESSENTIAL HYPERTENSION: ICD-10-CM

## 2018-12-11 DIAGNOSIS — I50.9 ACUTE CONGESTIVE HEART FAILURE, UNSPECIFIED HEART FAILURE TYPE (HCC): ICD-10-CM

## 2018-12-11 DIAGNOSIS — I25.10 CORONARY ARTERY DISEASE INVOLVING NATIVE CORONARY ARTERY OF NATIVE HEART WITHOUT ANGINA PECTORIS: ICD-10-CM

## 2018-12-11 LAB
ALBUMIN SERPL-MCNC: 4.07 G/DL (ref 3.2–4.8)
ALBUMIN/GLOB SERPL: 1.9 G/DL (ref 1.5–2.5)
ALP SERPL-CCNC: 71 U/L (ref 25–100)
ALT SERPL W P-5'-P-CCNC: 17 U/L (ref 7–40)
ANION GAP SERPL CALCULATED.3IONS-SCNC: 7 MMOL/L (ref 3–11)
AST SERPL-CCNC: 20 U/L (ref 0–33)
BASOPHILS # BLD AUTO: 0.02 10*3/MM3 (ref 0–0.2)
BASOPHILS NFR BLD AUTO: 0.2 % (ref 0–1)
BILIRUB SERPL-MCNC: 0.3 MG/DL (ref 0.3–1.2)
BNP SERPL-MCNC: 647 PG/ML (ref 0–100)
BUN BLD-MCNC: 15 MG/DL (ref 9–23)
BUN/CREAT SERPL: 21.1 (ref 7–25)
CALCIUM SPEC-SCNC: 9.3 MG/DL (ref 8.7–10.4)
CHLORIDE SERPL-SCNC: 101 MMOL/L (ref 99–109)
CO2 SERPL-SCNC: 29 MMOL/L (ref 20–31)
CREAT BLD-MCNC: 0.71 MG/DL (ref 0.6–1.3)
DEPRECATED RDW RBC AUTO: 53.5 FL (ref 37–54)
EOSINOPHIL # BLD AUTO: 0.33 10*3/MM3 (ref 0–0.3)
EOSINOPHIL NFR BLD AUTO: 3.3 % (ref 0–3)
ERYTHROCYTE [DISTWIDTH] IN BLOOD BY AUTOMATED COUNT: 15.1 % (ref 11.3–14.5)
GFR SERPL CREATININE-BSD FRML MDRD: 79 ML/MIN/1.73
GLOBULIN UR ELPH-MCNC: 2.1 GM/DL
GLUCOSE BLD-MCNC: 176 MG/DL (ref 70–100)
HCT VFR BLD AUTO: 31 % (ref 34.5–44)
HGB BLD-MCNC: 9.4 G/DL (ref 11.5–15.5)
IMM GRANULOCYTES # BLD: 0.03 10*3/MM3 (ref 0–0.03)
IMM GRANULOCYTES NFR BLD: 0.3 % (ref 0–0.6)
LYMPHOCYTES # BLD AUTO: 1.51 10*3/MM3 (ref 0.6–4.8)
LYMPHOCYTES NFR BLD AUTO: 15.3 % (ref 24–44)
MAGNESIUM SERPL-MCNC: 1.6 MG/DL (ref 1.3–2.7)
MCH RBC QN AUTO: 29.6 PG (ref 27–31)
MCHC RBC AUTO-ENTMCNC: 30.3 G/DL (ref 32–36)
MCV RBC AUTO: 97.5 FL (ref 80–99)
MONOCYTES # BLD AUTO: 0.47 10*3/MM3 (ref 0–1)
MONOCYTES NFR BLD AUTO: 4.8 % (ref 0–12)
NEUTROPHILS # BLD AUTO: 7.53 10*3/MM3 (ref 1.5–8.3)
NEUTROPHILS NFR BLD AUTO: 76.4 % (ref 41–71)
PLATELET # BLD AUTO: 276 10*3/MM3 (ref 150–450)
PMV BLD AUTO: 10.7 FL (ref 6–12)
POTASSIUM BLD-SCNC: 4.7 MMOL/L (ref 3.5–5.5)
PROT SERPL-MCNC: 6.2 G/DL (ref 5.7–8.2)
RBC # BLD AUTO: 3.18 10*6/MM3 (ref 3.89–5.14)
SODIUM BLD-SCNC: 137 MMOL/L (ref 132–146)
TSH SERPL DL<=0.05 MIU/L-ACNC: 4.86 MIU/ML (ref 0.35–5.35)
WBC NRBC COR # BLD: 9.86 10*3/MM3 (ref 3.5–10.8)

## 2018-12-11 PROCEDURE — 71046 X-RAY EXAM CHEST 2 VIEWS: CPT

## 2018-12-11 PROCEDURE — 99214 OFFICE O/P EST MOD 30 MIN: CPT | Performed by: NURSE PRACTITIONER

## 2018-12-11 PROCEDURE — 85025 COMPLETE CBC W/AUTO DIFF WBC: CPT | Performed by: NURSE PRACTITIONER

## 2018-12-11 PROCEDURE — 83735 ASSAY OF MAGNESIUM: CPT | Performed by: NURSE PRACTITIONER

## 2018-12-11 PROCEDURE — 84443 ASSAY THYROID STIM HORMONE: CPT | Performed by: NURSE PRACTITIONER

## 2018-12-11 PROCEDURE — 83880 ASSAY OF NATRIURETIC PEPTIDE: CPT | Performed by: NURSE PRACTITIONER

## 2018-12-11 PROCEDURE — 80053 COMPREHEN METABOLIC PANEL: CPT | Performed by: NURSE PRACTITIONER

## 2018-12-12 ENCOUNTER — TELEPHONE (OUTPATIENT)
Dept: CARDIOLOGY | Facility: HOSPITAL | Age: 83
End: 2018-12-12

## 2018-12-12 RX ORDER — POTASSIUM CHLORIDE 750 MG/1
10 TABLET, FILM COATED, EXTENDED RELEASE ORAL DAILY
Qty: 30 TABLET | Refills: 0 | Status: SHIPPED | OUTPATIENT
Start: 2018-12-12 | End: 2019-01-02 | Stop reason: SDUPTHER

## 2018-12-12 RX ORDER — FUROSEMIDE 20 MG/1
TABLET ORAL
Qty: 60 TABLET | Refills: 0 | Status: SHIPPED | OUTPATIENT
Start: 2018-12-12 | End: 2020-01-01 | Stop reason: SDUPTHER

## 2018-12-12 RX ORDER — CALCIUM CARBONATE 300MG(750)
1 TABLET,CHEWABLE ORAL 2 TIMES DAILY
Qty: 60 TABLET | Refills: 3 | Status: SHIPPED | OUTPATIENT
Start: 2018-12-12 | End: 2019-01-08 | Stop reason: SDUPTHER

## 2018-12-12 NOTE — TELEPHONE ENCOUNTER
Results for orders placed or performed in visit on 12/11/18   Comprehensive Metabolic Panel   Result Value Ref Range    Glucose 176 (H) 70 - 100 mg/dL    BUN 15 9 - 23 mg/dL    Creatinine 0.71 0.60 - 1.30 mg/dL    Sodium 137 132 - 146 mmol/L    Potassium 4.7 3.5 - 5.5 mmol/L    Chloride 101 99 - 109 mmol/L    CO2 29.0 20.0 - 31.0 mmol/L    Calcium 9.3 8.7 - 10.4 mg/dL    Total Protein 6.2 5.7 - 8.2 g/dL    Albumin 4.07 3.20 - 4.80 g/dL    ALT (SGPT) 17 7 - 40 U/L    AST (SGOT) 20 0 - 33 U/L    Alkaline Phosphatase 71 25 - 100 U/L    Total Bilirubin 0.3 0.3 - 1.2 mg/dL    eGFR Non African Amer 79 >60 mL/min/1.73    Globulin 2.1 gm/dL    A/G Ratio 1.9 1.5 - 2.5 g/dL    BUN/Creatinine Ratio 21.1 7.0 - 25.0    Anion Gap 7.0 3.0 - 11.0 mmol/L   BNP   Result Value Ref Range    .0 (H) 0.0 - 100.0 pg/mL   Magnesium   Result Value Ref Range    Magnesium 1.6 1.3 - 2.7 mg/dL   CBC Auto Differential   Result Value Ref Range    WBC 9.86 3.50 - 10.80 10*3/mm3    RBC 3.18 (L) 3.89 - 5.14 10*6/mm3    Hemoglobin 9.4 (L) 11.5 - 15.5 g/dL    Hematocrit 31.0 (L) 34.5 - 44.0 %    MCV 97.5 80.0 - 99.0 fL    MCH 29.6 27.0 - 31.0 pg    MCHC 30.3 (L) 32.0 - 36.0 g/dL    RDW 15.1 (H) 11.3 - 14.5 %    RDW-SD 53.5 37.0 - 54.0 fl    MPV 10.7 6.0 - 12.0 fL    Platelets 276 150 - 450 10*3/mm3    Neutrophil % 76.4 (H) 41.0 - 71.0 %    Lymphocyte % 15.3 (L) 24.0 - 44.0 %    Monocyte % 4.8 0.0 - 12.0 %    Eosinophil % 3.3 (H) 0.0 - 3.0 %    Basophil % 0.2 0.0 - 1.0 %    Immature Grans % 0.3 0.0 - 0.6 %    Neutrophils, Absolute 7.53 1.50 - 8.30 10*3/mm3    Lymphocytes, Absolute 1.51 0.60 - 4.80 10*3/mm3    Monocytes, Absolute 0.47 0.00 - 1.00 10*3/mm3    Eosinophils, Absolute 0.33 (H) 0.00 - 0.30 10*3/mm3    Basophils, Absolute 0.02 0.00 - 0.20 10*3/mm3    Immature Grans, Absolute 0.03 0.00 - 0.03 10*3/mm3   TSH   Result Value Ref Range    TSH 4.856 0.350 - 5.350 mIU/mL     CXR  IMPRESSION:  Borderline cardiomegaly without increase in  pulmonary  vascularity or evidence of overt edema. No effusion.      Called patient's daughter, Yajaira, and she reports that her mother is not doing much better. Still has a lot of swelling. She thinks the wheezing and shortness of breath is slightly better. She says she urinated 3 times before she got home (but this was also with multiple errands). She has no chest pain. Will start her on 40mg of lasix with potassium. She can continue chlorthalidone. Labs stable. Did send RX for magnesium. CXR appears stable. Advised her to call me if she is not better by Friday and will try and diurese her again and repeat echo at that time.

## 2018-12-13 ENCOUNTER — TELEPHONE (OUTPATIENT)
Dept: CARDIOLOGY | Facility: HOSPITAL | Age: 83
End: 2018-12-13

## 2018-12-13 DIAGNOSIS — R41.0 CONFUSION: ICD-10-CM

## 2018-12-13 DIAGNOSIS — I50.9 ACUTE CONGESTIVE HEART FAILURE, UNSPECIFIED HEART FAILURE TYPE (HCC): ICD-10-CM

## 2018-12-13 DIAGNOSIS — R06.02 SHORTNESS OF BREATH: Primary | ICD-10-CM

## 2018-12-13 NOTE — TELEPHONE ENCOUNTER
Called patient's daughter this morning to check on patient. She says her swelling is improved but shortness of breath is the same. She says Ms. Bird was sleepy, mildly confused and off balance last night but this morning she is better. She says she is not having any weakness, slurred speech, dizziness or other neurological deficits. She does not have chest pain but her right shoulder blade hurts. She says it started hurting after working with PT and she thinks its sore to touch. She does not want to go to ED and her daughter thinks she is better this morning. I told her that if she has any worsening symptoms that she needs to take her to the ED, especially if arm pain is not reproducible or if she develops any new neurological deficits. Will order labs to be done today at local hospital.

## 2018-12-14 ENCOUNTER — TELEPHONE (OUTPATIENT)
Dept: CARDIOLOGY | Facility: HOSPITAL | Age: 83
End: 2018-12-14

## 2018-12-14 DIAGNOSIS — I50.21 ACUTE SYSTOLIC CONGESTIVE HEART FAILURE (HCC): Primary | ICD-10-CM

## 2018-12-14 RX ORDER — METOLAZONE 2.5 MG/1
TABLET ORAL
Qty: 3 TABLET | Refills: 0 | Status: SHIPPED | OUTPATIENT
Start: 2018-12-14 | End: 2018-12-17

## 2018-12-14 NOTE — TELEPHONE ENCOUNTER
Called patient's daughter, Yajaira, to see how patient was doing.  She reports the patient is doing the same.  Still swollen and extremely fatigued. She has lost about 4lbs.      Received labs from [facility] which showed a glucose of 146, BUN 24, creatinine 0.9, sodium 136, potassium 3.4, chloride 97, carbon dioxide 33, proBNP 4931.  Urinalysis shows specific gravity of 1.0-2, pH 5, leukocytes 1+, negative urine nitrates, 2+ protein, trace glucose, trace ketones, negative bilirubin, negative blood. Reviewed results with patient's daughter. Advised her to bring Ms. Bird in today for IV diuresis and further evaluation but she says her mother is too tired and wants to wait until her appt with Dr. Whitley in the morning. Will send her in RX for metolazone 2.5mg 30 min before 40mg of lasix x 2-3 days. Patient to hold chlorthalidone while doing this and take 40meq of potassium a day. Repeat labs on Monday

## 2018-12-17 ENCOUNTER — OFFICE VISIT (OUTPATIENT)
Dept: CARDIOLOGY | Facility: CLINIC | Age: 83
End: 2018-12-17

## 2018-12-17 ENCOUNTER — READMISSION MANAGEMENT (OUTPATIENT)
Dept: CALL CENTER | Facility: HOSPITAL | Age: 83
End: 2018-12-17

## 2018-12-17 VITALS
SYSTOLIC BLOOD PRESSURE: 148 MMHG | BODY MASS INDEX: 23.75 KG/M2 | WEIGHT: 125.8 LBS | DIASTOLIC BLOOD PRESSURE: 68 MMHG | HEART RATE: 88 BPM | OXYGEN SATURATION: 94 % | HEIGHT: 61 IN

## 2018-12-17 DIAGNOSIS — I25.10 CORONARY ARTERY DISEASE INVOLVING NATIVE CORONARY ARTERY OF NATIVE HEART WITHOUT ANGINA PECTORIS: Primary | ICD-10-CM

## 2018-12-17 DIAGNOSIS — R06.09 DOE (DYSPNEA ON EXERTION): ICD-10-CM

## 2018-12-17 DIAGNOSIS — E78.2 MIXED HYPERLIPIDEMIA: ICD-10-CM

## 2018-12-17 DIAGNOSIS — I10 ESSENTIAL HYPERTENSION: ICD-10-CM

## 2018-12-17 PROCEDURE — 99214 OFFICE O/P EST MOD 30 MIN: CPT | Performed by: INTERNAL MEDICINE

## 2018-12-17 RX ORDER — CLINDAMYCIN HYDROCHLORIDE 300 MG/1
300 CAPSULE ORAL 2 TIMES DAILY
Refills: 0 | COMMUNITY
Start: 2018-12-15 | End: 2019-03-20

## 2018-12-17 NOTE — OUTREACH NOTE
Medical Week 3 Survey      Responses   Facility patient discharged from?  [place]   Does the patient have one of the following disease processes/diagnoses(primary or secondary)?  Other   Week 3 attempt successful?  Yes   Call start time  1125   Call end time  1131   Discharge diagnosis  Chest pain-s/p heart cath with stenting   Person spoke with today (if not patient) and relationship  Yajaira, daughter   Meds reviewed with patient/caregiver?  Yes   Is the patient taking all medications as directed (includes completed medication regime)?  Yes   Has the patient kept scheduled appointments due by today?  Yes   Home health comments  HH still coming   What is the patient's perception of their health status since discharge?  Worsening   Additional teach back comments  Pt fell at PCP office yesterday and broke bone in face. SHe was sent to ER. TOday she will see cardiologist. She is having a lot of SOB and felt like she was smothering yesterday. Daughter increased O2 for a whild. Pt has not felt better from hospital visit   Week 3 Call Completed?  Yes          Whit Jones RN

## 2018-12-17 NOTE — PROGRESS NOTES
Fani Bird  1935  [phone]      12/17/2018      PCP: Emerson Alvarez MD  [address]  [facility] [address]    IDENTIFICATION: A 82 y.o. female  from [place].      PROBLEM LIST:  1. CAD:  a. On 01/10/2014: NSTEMI with 3.0 x 15 Integrity BMS to ostium of circumflex per MGR. Normal LVEF.  b. September 2015, admission to [facility] with ACS with negative enzymes and negative EKG.   c. 11/10/18 severe distal left main, ostial LAD, ostial circumflex disease.  CT S consult obtained, patient too high risk for CABG  d. 11/10/18 echo: EF 60%, basal inferior lateral and mid inferior lateral hypokinetic wall segments, grade 1 diastolic dysfunction  e. 11/28/18 [facility] high-risk PCI of the left main, LAD  3.5 Tryton bifurcation stent, CX balloon dilated no stent - Daren  2. Cerebrovascular disease:  a. TIA/CVA, September 2013.  b. 10/17 CUS nonobstructive  c. 11/18 C US no change  3. Lower extremity swelling and dyspnea:  a. Improved with iron supplementation and stockings.   4. Lower extremity rash following implantation of sulfa antibiotic.  5. Hypertension.  6. Dyslipidemia:  a. May 2012 - Total 156, triglycerides 297, HDL 47, and LDL 54.  7. GERD.  8. Vitamin D deficiency.   9. Hospitalization, September 2013, with pneumonia, rhabdomyolysis, and CVA.  10. Diabetes mellitus.  a. Hemoglobin A1c of 8.3.  11. Prior surgeries:  a. Hysterectomy.  b. Carpal tunnel release bilaterally.  c. Gallbladder resection.  d. Back surgery  e. Bladder tack.    Chief Complaint   Patient presents with   • Coronary Artery Disease       Allergies  Allergies   Allergen Reactions   • Isosorbide Hives   • Augmentin [Amoxicillin-Pot Clavulanate] Diarrhea and Rash       Current Medications    Current Outpatient Medications:   •  albuterol (PROVENTIL) (2.5 MG/3ML) 0.083% nebulizer solution, Take 2.5 mg by nebulization Every 4 (Four) Hours As Needed., Disp: , Rfl:   •  albuterol (VENTOLIN HFA) 108 (90 BASE) MCG/ACT  inhaler, Inhale 1 puff 2 (Two) Times a Day. Before taking Flovent, Disp: , Rfl:   •  amLODIPine (NORVASC) 5 MG tablet, Take 1 tablet by mouth Daily., Disp: 30 tablet, Rfl: 0  •  ascorbic acid (VITAMIN C) 1000 MG tablet, Take 1 tablet by mouth Daily., Disp: , Rfl:   •  aspirin 81 MG tablet, Take 81 mg by mouth Daily., Disp: , Rfl:   •  atorvastatin (LIPITOR) 80 MG tablet, Take 1 tablet by mouth Daily., Disp: 30 tablet, Rfl: 3  •  carBAMazepine (TEGRETOL) 200 MG tablet, Take 100 mg by mouth 2 (Two) Times a Day., Disp: , Rfl:   •  carvedilol (COREG) 3.125 MG tablet, Take 1 tablet by mouth Every 12 (Twelve) Hours., Disp: 60 tablet, Rfl: 3  •  celecoxib (CELEBREX) 200 MG capsule, Take 200 mg by mouth Daily., Disp: , Rfl:   •  chlorthalidone (HYGROTON) 25 MG tablet, Take 1 tablet by mouth Daily., Disp: 90 tablet, Rfl: 2  •  Cholecalciferol (VITAMIN D PO), Take 1,000 Units by mouth Daily., Disp: , Rfl:   •  clindamycin (CLEOCIN) 300 MG capsule, 300 mg 2 (Two) Times a Day., Disp: , Rfl: 0  •  clopidogrel (PLAVIX) 75 MG tablet, Take 1 tablet by mouth Daily., Disp: 30 tablet, Rfl: 3  •  Cranberry 1000 MG capsule, Take 1 capsule by mouth Daily., Disp: , Rfl:   •  Cyanocobalamin (VITAMIN B-12 PO), Take 1 tablet by mouth 1 (One) Time Per Week. Taken on Saturday, Disp: , Rfl:   •  cycloSPORINE (RESTASIS) 0.05 % ophthalmic emulsion, Apply 1 drop to eye(s) as directed by provider 2 (Two) Times a Day., Disp: , Rfl:   •  diazepam (VALIUM) 2 MG tablet, Take 2 mg by mouth Every Night., Disp: , Rfl:   •  docusate sodium (COLACE) 100 MG capsule, Take 100 mg by mouth 2 (Two) Times a Day., Disp: , Rfl:   •  estropipate (OGEN) 1.5 MG tablet, Take 1.5 mg by mouth Daily., Disp: , Rfl:   •  ferrous sulfate 325 (65 FE) MG tablet, Take 325 mg by mouth Daily With Breakfast & Dinner. 2 daily, Disp: , Rfl:   •  fexofenadine (ALLEGRA) 180 MG tablet, Take 180 mg by mouth 2 (Two) Times a Day., Disp: , Rfl:   •  fluticasone (FLOVENT HFA) 220 MCG/ACT  inhaler, Inhale 1 puff 2 (Two) Times a Day., Disp: , Rfl:   •  furosemide (LASIX) 20 MG tablet, Take 1-2 tablets daily as directed, Disp: 60 tablet, Rfl: 0  •  gabapentin (NEURONTIN) 400 MG capsule, Take 400 mg by mouth 4 (Four) Times a Day., Disp: , Rfl:   •  glucosamine-chondroitin 500-400 MG capsule capsule, Take 2 capsules by mouth Daily., Disp: , Rfl:   •  guaiFENesin 200 MG tablet, Take 200 mg by mouth Every Night., Disp: , Rfl:   •  insulin aspart prot-insulin aspart (novoLOG 70/30) (70-30) 100 UNIT/ML injection, Inject 24 Units under the skin into the appropriate area as directed Every Morning Before Breakfast., Disp: , Rfl:   •  insulin aspart prot-insulin aspart (novoLOG 70/30) (70-30) 100 UNIT/ML injection, Inject 22 Units under the skin into the appropriate area as directed Daily Before Supper., Disp: , Rfl:   •  losartan (COZAAR) 50 MG tablet, Take 1 tablet by mouth Daily., Disp: 30 tablet, Rfl: 0  •  Magnesium 400 MG tablet, Take 1 tablet by mouth 2 (Two) Times a Day., Disp: 60 tablet, Rfl: 3  •  mometasone (NASONEX) 50 MCG/ACT nasal spray, 2 sprays into the nostril(s) as directed by provider Every Evening., Disp: , Rfl:   •  montelukast (SINGULAIR) 10 MG tablet, Take 10 mg by mouth Every Night., Disp: , Rfl:   •  Morphine (MS CONTIN) 30 MG 12 hr tablet, Take 30 mg by mouth 2 (Two) Times a Day., Disp: , Rfl:   •  Morphine (MSIR) 15 MG tablet, Take 15 mg by mouth 2 (Two) Times a Day As Needed for Severe Pain ., Disp: , Rfl:   •  Multiple Vitamins-Minerals (ICAPS AREDS 2 PO), Take 1 capsule by mouth Daily., Disp: , Rfl:   •  nitrofurantoin (MACRODANTIN) 50 MG capsule, Take 50 mg by mouth Every Night., Disp: , Rfl:   •  nitroglycerin (NITROSTAT) 0.4 MG SL tablet, Place 1 tablet under the tongue Every 5 (Five) Minutes As Needed for Chest Pain., Disp: 25 tablet, Rfl: 3  •  Omega-3 Fatty Acids (FISH OIL PO), Take 1,000 mg by mouth Daily., Disp: , Rfl:   •  pantoprazole (PROTONIX) 40 MG EC tablet, Take 40 mg  "by mouth 2 (Two) Times a Day., Disp: , Rfl:   •  potassium chloride (K-DUR) 10 MEQ CR tablet, Take 1 tablet by mouth Daily. With lasix, Disp: 30 tablet, Rfl: 0  •  ranolazine (RANEXA) 500 MG 12 hr tablet, Take 1 tablet by mouth Every 12 (Twelve) Hours for 30 days., Disp: 60 tablet, Rfl: 0  •  tiZANidine (ZANAFLEX) 4 MG tablet, Take 4 mg by mouth Every Night., Disp: , Rfl:   •  tolterodine LA (DETROL LA) 4 MG 24 hr capsule, Take 1 capsule by mouth daily., Disp: , Rfl:     History of Present Illness     Pt in follow-up post high risk LM PCI.  Friday to pcp and unfortunately fell w bilateral orbital bruising. Baseline dyspnea and campos not improved at current.      ROS:  All systems have been reviewed and are negative with the exception of those mentioned in the HPI.    OBJECTIVE:  Vitals:    12/17/18 1428   Weight: 57.1 kg (125 lb 12.8 oz)   Height: 154.9 cm (61\")     Physical Exam   Constitutional: She appears well-developed and well-nourished.   Neck: Normal range of motion. Neck supple. No hepatojugular reflux and no JVD present. Carotid bruit is not present. No tracheal deviation present. No thyromegaly present.   Cardiovascular: Normal rate, regular rhythm, S1 normal, S2 normal, intact distal pulses and normal pulses. PMI is not displaced. Exam reveals no gallop, no distant heart sounds, no friction rub, no midsystolic click and no opening snap.   Murmur heard.  Pulses:       Carotid pulses are on the right side with bruit, and on the left side with bruit.       Radial pulses are 2+ on the right side, and 2+ on the left side.        Dorsalis pedis pulses are 2+ on the right side, and 2+ on the left side.        Posterior tibial pulses are 2+ on the right side, and 2+ on the left side.   Pulmonary/Chest: Effort normal and breath sounds normal. She has no wheezes. She has no rales.   Abdominal: Soft. Bowel sounds are normal. She exhibits no mass. There is no tenderness. There is no guarding.       Diagnostic " Data:  Procedures      ASSESSMENT:   Diagnosis Plan   1. Coronary artery disease involving native coronary artery of native heart without angina pectoris     2. SANTOS (dyspnea on exertion)     3. Essential hypertension     4. Mixed hyperlipidemia         PLAN:  1. Coronary artery disease, post recent percutaneous coronary intervention and stenting with low normal left ventricular ejection fraction. Carotid stenosis, asymptomatic.  Screening carotid duplex at next visit  2. Hypertension, controlled. I will stop chlorthalidone w hyponatremia.  3. Dyslipidemia, on statin therapy.  4. SANTOS continue prn diuresis as gfr tolerant. Furosemide 20 daily, 40 prn weight up 2lb /24 hr      Emerson Alvarez MD, thank you for referring Ms. Bird for evaluation.  I have forwarded my electronically generated recommendations to you for review.  Please do not hesitate to call with any questions.    Scribed for Chinmay Whitley MD by Mely Gomez PA-C. 12/17/2018  2:37 PM  I, Chinmay Whitley MD, personally performed the services described in this documentation as scribed by the above named individual in my presence, and it is both accurate and complete.  12/17/2018  2:37 PM    Chinmay Whitley MD, [facility]

## 2018-12-20 ENCOUNTER — DOCUMENTATION (OUTPATIENT)
Dept: CARDIAC REHAB | Facility: HOSPITAL | Age: 83
End: 2018-12-20

## 2018-12-20 NOTE — PROGRESS NOTES
Spoke with patients daughter and she stated they were not interested at this time because her mother was getting home health therapy.

## 2018-12-24 ENCOUNTER — READMISSION MANAGEMENT (OUTPATIENT)
Dept: CALL CENTER | Facility: HOSPITAL | Age: 83
End: 2018-12-24

## 2018-12-24 NOTE — OUTREACH NOTE
Medical Week 4 Survey      Responses   Facility patient discharged from?  [facility]   Does the patient have one of the following disease processes/diagnoses(primary or secondary)?  Other   Week 4 attempt successful?  No          Whit Jones RN

## 2019-01-01 ENCOUNTER — TELEPHONE (OUTPATIENT)
Dept: CARDIOLOGY | Facility: CLINIC | Age: 84
End: 2019-01-01

## 2019-01-02 ENCOUNTER — TELEPHONE (OUTPATIENT)
Dept: CARDIOLOGY | Facility: CLINIC | Age: 84
End: 2019-01-02

## 2019-01-02 DIAGNOSIS — Z79.899 LONG TERM CURRENT USE OF DIURETIC: Primary | ICD-10-CM

## 2019-01-02 RX ORDER — POTASSIUM CHLORIDE 750 MG/1
10 TABLET, FILM COATED, EXTENDED RELEASE ORAL DAILY
Qty: 30 TABLET | Refills: 0 | Status: SHIPPED | OUTPATIENT
Start: 2019-01-02 | End: 2019-01-08 | Stop reason: SDUPTHER

## 2019-01-02 NOTE — TELEPHONE ENCOUNTER
Pt's daughter Yajaira called to request clarification of potassium and mag replacement. She is out of K 10 meq she was taking daily with 20 mg furosemide. Per last telpehone note pt was to have repeat labs on 12/19. These were not done. Will fax lab orders to [facility]. Pt is taking furosemide 20 mg daily with potassium 10 meq daily and magnesium 400 BID. Will await lab results.  [facility] phone [phone], fax [phone]    Results BMP, Mag (scanned in Epic)  BMP: , BUN 13, CR 0.6, , K 4.7  Magnesium: 1.9    Continue supplement of K 10 meq daily and magnesium 400 BID with furosemide 20 mg daily??

## 2019-01-07 ENCOUNTER — RESULTS ENCOUNTER (OUTPATIENT)
Dept: CARDIOLOGY | Facility: CLINIC | Age: 84
End: 2019-01-07

## 2019-01-07 DIAGNOSIS — Z79.899 LONG TERM CURRENT USE OF DIURETIC: ICD-10-CM

## 2019-01-08 RX ORDER — POTASSIUM CHLORIDE 750 MG/1
10 TABLET, FILM COATED, EXTENDED RELEASE ORAL DAILY
Qty: 90 TABLET | Refills: 3 | Status: SHIPPED | OUTPATIENT
Start: 2019-01-08 | End: 2019-05-07 | Stop reason: SDUPTHER

## 2019-01-08 RX ORDER — CALCIUM CARBONATE 300MG(750)
1 TABLET,CHEWABLE ORAL DAILY
Qty: 30 TABLET | Refills: 11
Start: 2019-01-08

## 2019-03-20 ENCOUNTER — OFFICE VISIT (OUTPATIENT)
Dept: CARDIOLOGY | Facility: CLINIC | Age: 84
End: 2019-03-20

## 2019-03-20 VITALS
DIASTOLIC BLOOD PRESSURE: 58 MMHG | SYSTOLIC BLOOD PRESSURE: 134 MMHG | HEIGHT: 61 IN | HEART RATE: 71 BPM | OXYGEN SATURATION: 95 % | WEIGHT: 126 LBS | BODY MASS INDEX: 23.79 KG/M2

## 2019-03-20 DIAGNOSIS — I50.32 CHRONIC DIASTOLIC CONGESTIVE HEART FAILURE (HCC): ICD-10-CM

## 2019-03-20 DIAGNOSIS — E78.2 MIXED HYPERLIPIDEMIA: ICD-10-CM

## 2019-03-20 DIAGNOSIS — I10 ESSENTIAL HYPERTENSION: ICD-10-CM

## 2019-03-20 DIAGNOSIS — I25.10 CORONARY ARTERY DISEASE INVOLVING NATIVE CORONARY ARTERY OF NATIVE HEART WITHOUT ANGINA PECTORIS: Primary | ICD-10-CM

## 2019-03-20 PROCEDURE — 99213 OFFICE O/P EST LOW 20 MIN: CPT | Performed by: INTERNAL MEDICINE

## 2019-03-20 RX ORDER — CLONAZEPAM 0.5 MG/1
0.5 TABLET ORAL DAILY
COMMUNITY

## 2019-03-20 NOTE — PROGRESS NOTES
Fani Bird  1935  [phone]    VISIT DATE:  3/20/2019      PCP: Emerson Alvarez MD  [address]  [facility] [address]    IDENTIFICATION: A 83 y.o. female  from [place].      PROBLEM LIST:  1. CAD:  a. On 01/10/2014: NSTEMI with 3.0 x 15 Integrity BMS to ostium of circumflex per MGR. Normal LVEF.  b. September 2015, admission to [facility] with ACS with negative enzymes and negative EKG.   c. 11/10/18 severe distal left main, ostial LAD, ostial circumflex disease.  CT S consult obtained, patient too high risk for CABG  d. 11/10/18 echo: EF 60%, basal inferior lateral and mid inferior lateral hypokinetic wall segments, grade 1 diastolic dysfunction  e. 11/28/18 [facility] high-risk PCI of the left main, LAD  3.5 Tryton bifurcation stent, CX balloon dilated no stent - Daren  2. Cerebrovascular disease:  a. TIA/CVA, September 2013.  b. 10/17 CUS nonobstructive  c. 11/18 C US no change  3. Lower extremity swelling and dyspnea:  a. Improved with iron supplementation and stockings.   4. Lower extremity rash following implantation of sulfa antibiotic.  5. Hypertension.  6. Dyslipidemia:  a. May 2012 - Total 156, triglycerides 297, HDL 47, and LDL 54.  7. GERD.  8. Vitamin D deficiency.   9. Hospitalization, September 2013, with pneumonia, rhabdomyolysis, and CVA.  10. Diabetes mellitus.  a. Hemoglobin A1c of 8.3.  11. Prior surgeries:  a. Hysterectomy.  b. Carpal tunnel release bilaterally.  c. Gallbladder resection.  d. Back surgery  e. Bladder tack.    Chief Complaint   Patient presents with   • Coronary Artery Disease       Allergies  Allergies   Allergen Reactions   • Isosorbide Hives   • Augmentin [Amoxicillin-Pot Clavulanate] Diarrhea and Rash       Current Medications    Current Outpatient Medications:   •  albuterol (PROVENTIL) (2.5 MG/3ML) 0.083% nebulizer solution, Take 2.5 mg by nebulization Every 4 (Four) Hours As Needed., Disp: , Rfl:   •  albuterol (VENTOLIN HFA) 108 (90  BASE) MCG/ACT inhaler, Inhale 1 puff 2 (Two) Times a Day. Before taking Flovent, Disp: , Rfl:   •  amLODIPine (NORVASC) 5 MG tablet, Take 1 tablet by mouth Daily. (Patient taking differently: Take 10 mg by mouth Daily.), Disp: 30 tablet, Rfl: 0  •  ascorbic acid (VITAMIN C) 1000 MG tablet, Take 1 tablet by mouth Daily., Disp: , Rfl:   •  aspirin 81 MG tablet, Take 81 mg by mouth Daily., Disp: , Rfl:   •  atorvastatin (LIPITOR) 80 MG tablet, Take 1 tablet by mouth Daily. (Patient taking differently: Take 40 mg by mouth Daily.), Disp: 30 tablet, Rfl: 3  •  carBAMazepine (TEGRETOL) 200 MG tablet, Take 100 mg by mouth 2 (Two) Times a Day., Disp: , Rfl:   •  carvedilol (COREG) 3.125 MG tablet, Take 1 tablet by mouth Every 12 (Twelve) Hours., Disp: 60 tablet, Rfl: 3  •  celecoxib (CELEBREX) 200 MG capsule, Take 200 mg by mouth Daily., Disp: , Rfl:   •  Cholecalciferol (VITAMIN D PO), Take 1,000 Units by mouth Daily., Disp: , Rfl:   •  clonazePAM (KlonoPIN) 0.5 MG tablet, Take 0.5 mg by mouth Daily., Disp: , Rfl:   •  clopidogrel (PLAVIX) 75 MG tablet, Take 1 tablet by mouth Daily., Disp: 30 tablet, Rfl: 3  •  Cranberry 1000 MG capsule, Take 1 capsule by mouth Daily., Disp: , Rfl:   •  Cyanocobalamin (VITAMIN B-12 PO), Take 1 tablet by mouth 1 (One) Time Per Week. Taken on Saturday, Disp: , Rfl:   •  cycloSPORINE (RESTASIS) 0.05 % ophthalmic emulsion, Apply 1 drop to eye(s) as directed by provider 2 (Two) Times a Day., Disp: , Rfl:   •  docusate sodium (COLACE) 100 MG capsule, Take 100 mg by mouth As Needed., Disp: , Rfl:   •  estropipate (OGEN) 1.5 MG tablet, Take 0.5 mg by mouth Daily., Disp: , Rfl:   •  ferrous sulfate 325 (65 FE) MG tablet, Take 325 mg by mouth 2 (Two) Times a Day. 2 daily, Disp: , Rfl:   •  fexofenadine (ALLEGRA) 180 MG tablet, Take 180 mg by mouth 2 (Two) Times a Day., Disp: , Rfl:   •  fluticasone (FLOVENT HFA) 220 MCG/ACT inhaler, Inhale 1 puff 2 (Two) Times a Day., Disp: , Rfl:   •  furosemide  (LASIX) 20 MG tablet, Take 1-2 tablets daily as directed, Disp: 60 tablet, Rfl: 0  •  gabapentin (NEURONTIN) 400 MG capsule, Take 400 mg by mouth 4 (Four) Times a Day., Disp: , Rfl:   •  insulin aspart prot-insulin aspart (novoLOG 70/30) (70-30) 100 UNIT/ML injection, Inject 24 Units under the skin into the appropriate area as directed Every Morning Before Breakfast., Disp: , Rfl:   •  insulin aspart prot-insulin aspart (novoLOG 70/30) (70-30) 100 UNIT/ML injection, Inject 22 Units under the skin into the appropriate area as directed Daily Before Supper., Disp: , Rfl:   •  losartan (COZAAR) 50 MG tablet, Take 1 tablet by mouth Daily. (Patient taking differently: Take 100 mg by mouth Daily.), Disp: 30 tablet, Rfl: 0  •  Magnesium 400 MG tablet, Take 1 tablet by mouth Daily., Disp: 30 tablet, Rfl: 11  •  mometasone (NASONEX) 50 MCG/ACT nasal spray, 2 sprays into the nostril(s) as directed by provider Every Evening., Disp: , Rfl:   •  montelukast (SINGULAIR) 10 MG tablet, Take 10 mg by mouth Every Night., Disp: , Rfl:   •  Morphine (MS CONTIN) 30 MG 12 hr tablet, Take 30 mg by mouth 2 (Two) Times a Day., Disp: , Rfl:   •  Morphine (MSIR) 15 MG tablet, Take 15 mg by mouth 2 (Two) Times a Day As Needed for Severe Pain ., Disp: , Rfl:   •  nitrofurantoin (MACRODANTIN) 50 MG capsule, Take 50 mg by mouth Every Night., Disp: , Rfl:   •  nitroglycerin (NITROSTAT) 0.4 MG SL tablet, Place 1 tablet under the tongue Every 5 (Five) Minutes As Needed for Chest Pain., Disp: 25 tablet, Rfl: 3  •  Omega-3 Fatty Acids (FISH OIL PO), Take 1,000 mg by mouth Daily., Disp: , Rfl:   •  pantoprazole (PROTONIX) 40 MG EC tablet, Take 40 mg by mouth 2 (Two) Times a Day., Disp: , Rfl:   •  potassium chloride (K-DUR) 10 MEQ CR tablet, Take 1 tablet by mouth Daily. With lasix, Disp: 90 tablet, Rfl: 3  •  tiZANidine (ZANAFLEX) 4 MG tablet, Take 4 mg by mouth Every Night., Disp: , Rfl:   •  tolterodine LA (DETROL LA) 4 MG 24 hr capsule, Take 1  "capsule by mouth daily., Disp: , Rfl:     History of Present Illness     Pt in follow-up post high risk LM PCI.  Patient was released from speech therapy and is able to eat solid foods again.  She is feeling improved with her chronic fatigue but no recurrent chest pain symptoms.    ROS:  All systems have been reviewed and are negative with the exception of those mentioned in the HPI.    OBJECTIVE:  Vitals:    03/20/19 1337   BP: 134/58   BP Location: Right arm   Patient Position: Sitting   Pulse: 71   SpO2: 95%   Weight: 57.2 kg (126 lb)   Height: 154.9 cm (61\")     Physical Exam   Constitutional: She appears well-developed and well-nourished.   Neck: Normal range of motion. Neck supple. No hepatojugular reflux and no JVD present. Carotid bruit is not present. No tracheal deviation present. No thyromegaly present.   Cardiovascular: Normal rate, regular rhythm, S1 normal, S2 normal, intact distal pulses and normal pulses. PMI is not displaced. Exam reveals no gallop, no distant heart sounds, no friction rub, no midsystolic click and no opening snap.   Murmur heard.  Pulses:       Carotid pulses are on the right side with bruit, and on the left side with bruit.       Radial pulses are 2+ on the right side, and 2+ on the left side.        Dorsalis pedis pulses are 2+ on the right side, and 2+ on the left side.        Posterior tibial pulses are 2+ on the right side, and 2+ on the left side.   Pulmonary/Chest: Effort normal and breath sounds normal. She has no wheezes. She has no rales.   Abdominal: Soft. Bowel sounds are normal. She exhibits no mass. There is no tenderness. There is no guarding.       Diagnostic Data:  Procedures      ASSESSMENT:   Diagnosis Plan   1. Coronary artery disease involving native coronary artery of native heart without angina pectoris     2. Essential hypertension     3. Mixed hyperlipidemia     4. Chronic diastolic congestive heart failure (CMS/HCC)         PLAN:  1. Coronary artery " disease, post recent percutaneous coronary intervention and stenting with low normal left ventricular ejection fraction. Carotid stenosis, asymptomatic.  Screening carotid duplex at next visit  2. Hypertension, controlled. I will stop chlorthalidone w hyponatremia.  3. Dyslipidemia, on statin therapy.  4. SANTOS continue prn diuresis as gfr tolerant. Furosemide 20 daily, 40 prn weight up 2lb /24 hr      Emerson Alvarez MD, thank you for referring Ms. Bird for evaluation.  I have forwarded my electronically generated recommendations to you for review.  Please do not hesitate to call with any questions.    Scribed for Chinmay Whitley MD by Mely Gomez PA-C. 3/20/2019  2:08 PM     Chinmay Whitley MD, [facility]  I, Chinmay Whitley MD, personally performed the services described in this documentation as scribed by the above named individual in my presence, and it is both accurate and complete.  3/20/2019  2:08 PM

## 2019-05-07 RX ORDER — POTASSIUM CHLORIDE 750 MG/1
10 TABLET, FILM COATED, EXTENDED RELEASE ORAL DAILY
Qty: 90 TABLET | Refills: 3 | Status: SHIPPED | OUTPATIENT
Start: 2019-05-07 | End: 2019-10-14

## 2019-06-07 ENCOUNTER — TELEPHONE (OUTPATIENT)
Dept: CARDIOLOGY | Facility: CLINIC | Age: 84
End: 2019-06-07

## 2019-06-07 NOTE — TELEPHONE ENCOUNTER
"pts daughter called in to let you know that she has been admitted into [facility]. She has pna and her \"heart enzymes\" are elevated. Wanted to let you know since she recently had the stents placed in 2018.  "

## 2019-06-11 RX ORDER — CHLORTHALIDONE 25 MG/1
TABLET ORAL
Qty: 90 TABLET | Refills: 2 | OUTPATIENT
Start: 2019-06-11

## 2019-10-14 ENCOUNTER — OFFICE VISIT (OUTPATIENT)
Dept: CARDIOLOGY | Facility: CLINIC | Age: 84
End: 2019-10-14

## 2019-10-14 VITALS
DIASTOLIC BLOOD PRESSURE: 60 MMHG | HEART RATE: 70 BPM | BODY MASS INDEX: 23.79 KG/M2 | HEIGHT: 61 IN | SYSTOLIC BLOOD PRESSURE: 126 MMHG | WEIGHT: 126 LBS | OXYGEN SATURATION: 82 %

## 2019-10-14 DIAGNOSIS — I50.32 CHRONIC DIASTOLIC CONGESTIVE HEART FAILURE (HCC): ICD-10-CM

## 2019-10-14 DIAGNOSIS — E78.2 MIXED HYPERLIPIDEMIA: ICD-10-CM

## 2019-10-14 DIAGNOSIS — I25.10 CORONARY ARTERY DISEASE INVOLVING NATIVE CORONARY ARTERY OF NATIVE HEART WITHOUT ANGINA PECTORIS: Primary | ICD-10-CM

## 2019-10-14 DIAGNOSIS — I10 ESSENTIAL HYPERTENSION: ICD-10-CM

## 2019-10-14 PROCEDURE — 99214 OFFICE O/P EST MOD 30 MIN: CPT | Performed by: INTERNAL MEDICINE

## 2019-10-14 NOTE — PROGRESS NOTES
[facility] at [facility] [place], [place]  Office Progress Note  Fani Bird  1935      Visit Date: 10/14/19    PCP: Emerson Alvarez MD  [address]  [facility] [address]    IDENTIFICATION: A 83 y.o. female  from [place].      PROBLEM LIST:  1. CAD:  a. On 01/10/2014: NSTEMI with 3.0 x 15 Integrity BMS to ostium of circumflex per MGR. Normal LVEF.  b. September 2015, admission to [facility] with ACS with negative enzymes and negative EKG.   c. 11/10/18 severe distal left main, ostial LAD, ostial circumflex disease.  CT S consult obtained, patient too high risk for CABG  d. 11/10/18 echo: EF 60%, basal inferior lateral and mid inferior lateral hypokinetic wall segments, grade 1 diastolic dysfunction  e. 11/28/18 [facility] high-risk PCI of the left main, LAD  3.5 Tryton bifurcation stent, CX balloon dilated no stent - Daren  2. Cerebrovascular disease:  a. TIA/CVA, September 2013.  b. 10/17 CUS nonobstructive  c. 11/18 C US no change  3. Lower extremity swelling and dyspnea:  a. Improved with iron supplementation and stockings.   4. Lower extremity rash following implantation of sulfa antibiotic.  5. Hypertension.  6. Dyslipidemia:  a. May 2012 - Total 156, triglycerides 297, HDL 47, and LDL 54.  7. GERD.  8. Vitamin D deficiency.   9. Hospitalization, September 2013, with pneumonia, rhabdomyolysis, and CVA.  10. Diabetes mellitus.  a. Hemoglobin A1c of 8.3.  11. Prior surgeries:  a. Hysterectomy.  b. Carpal tunnel release bilaterally.  c. Gallbladder resection.  d. Back surgery  e. Bladder tack.      Chief Complaint   Patient presents with   • Coronary Artery Disease       Allergies  Allergies   Allergen Reactions   • Isosorbide Hives   • Augmentin [Amoxicillin-Pot Clavulanate] Diarrhea and Rash       Current Medications    Current Outpatient Medications:   •  albuterol (PROVENTIL) (2.5 MG/3ML) 0.083% nebulizer solution, Take 2.5 mg by nebulization Every 4 (Four) Hours As Needed., Disp:  , Rfl:   •  albuterol (VENTOLIN HFA) 108 (90 BASE) MCG/ACT inhaler, Inhale 1 puff 2 (Two) Times a Day. Before taking Flovent, Disp: , Rfl:   •  amLODIPine (NORVASC) 5 MG tablet, Take 1 tablet by mouth Daily. (Patient taking differently: Take 10 mg by mouth Daily.), Disp: 30 tablet, Rfl: 0  •  ascorbic acid (VITAMIN C) 1000 MG tablet, Take 1 tablet by mouth Daily., Disp: , Rfl:   •  aspirin 81 MG tablet, Take 81 mg by mouth Daily., Disp: , Rfl:   •  atorvastatin (LIPITOR) 80 MG tablet, Take 1 tablet by mouth Daily. (Patient taking differently: Take 40 mg by mouth Daily.), Disp: 30 tablet, Rfl: 3  •  carBAMazepine (TEGRETOL) 200 MG tablet, Take 100 mg by mouth 2 (Two) Times a Day., Disp: , Rfl:   •  carvedilol (COREG) 3.125 MG tablet, Take 1 tablet by mouth Every 12 (Twelve) Hours., Disp: 60 tablet, Rfl: 3  •  celecoxib (CELEBREX) 200 MG capsule, Take 200 mg by mouth Daily., Disp: , Rfl:   •  Cholecalciferol (VITAMIN D PO), Take 1,000 Units by mouth Daily., Disp: , Rfl:   •  clonazePAM (KlonoPIN) 0.5 MG tablet, Take 0.5 mg by mouth Daily., Disp: , Rfl:   •  clopidogrel (PLAVIX) 75 MG tablet, Take 1 tablet by mouth Daily., Disp: 30 tablet, Rfl: 3  •  Cranberry 1000 MG capsule, Take 1 capsule by mouth Daily., Disp: , Rfl:   •  Cyanocobalamin (VITAMIN B-12 PO), Take 1 tablet by mouth 1 (One) Time Per Week. Taken on Saturday, Disp: , Rfl:   •  cycloSPORINE (RESTASIS) 0.05 % ophthalmic emulsion, Apply 1 drop to eye(s) as directed by provider 2 (Two) Times a Day., Disp: , Rfl:   •  estropipate (OGEN) 1.5 MG tablet, Take 0.5 mg by mouth Daily., Disp: , Rfl:   •  ferrous sulfate 325 (65 FE) MG tablet, Take 325 mg by mouth Daily With Breakfast. 2 daily, Disp: , Rfl:   •  fexofenadine (ALLEGRA) 180 MG tablet, Take 180 mg by mouth 2 (Two) Times a Day., Disp: , Rfl:   •  fluticasone (FLOVENT HFA) 220 MCG/ACT inhaler, Inhale 1 puff 2 (Two) Times a Day., Disp: , Rfl:   •  furosemide (LASIX) 20 MG tablet, Take 1-2 tablets daily as  directed, Disp: 60 tablet, Rfl: 0  •  gabapentin (NEURONTIN) 400 MG capsule, Take 400 mg by mouth 4 (Four) Times a Day., Disp: , Rfl:   •  insulin aspart prot-insulin aspart (novoLOG 70/30) (70-30) 100 UNIT/ML injection, Inject 26 Units under the skin into the appropriate area as directed Every Morning Before Breakfast., Disp: , Rfl:   •  insulin aspart prot-insulin aspart (novoLOG 70/30) (70-30) 100 UNIT/ML injection, Inject 22 Units under the skin into the appropriate area as directed Daily Before Supper., Disp: , Rfl:   •  losartan (COZAAR) 50 MG tablet, Take 1 tablet by mouth Daily. (Patient taking differently: Take 100 mg by mouth Daily.), Disp: 30 tablet, Rfl: 0  •  Magnesium 400 MG tablet, Take 1 tablet by mouth Daily., Disp: 30 tablet, Rfl: 11  •  mometasone (NASONEX) 50 MCG/ACT nasal spray, 2 sprays into the nostril(s) as directed by provider Every Evening., Disp: , Rfl:   •  montelukast (SINGULAIR) 10 MG tablet, Take 10 mg by mouth Every Night., Disp: , Rfl:   •  Morphine (MS CONTIN) 30 MG 12 hr tablet, Take 30 mg by mouth 2 (Two) Times a Day., Disp: , Rfl:   •  Morphine (MSIR) 15 MG tablet, Take 15 mg by mouth 2 (Two) Times a Day As Needed for Severe Pain ., Disp: , Rfl:   •  nitrofurantoin (MACRODANTIN) 50 MG capsule, Take 50 mg by mouth Every Night., Disp: , Rfl:   •  nitroglycerin (NITROSTAT) 0.4 MG SL tablet, Place 1 tablet under the tongue Every 5 (Five) Minutes As Needed for Chest Pain., Disp: 25 tablet, Rfl: 3  •  O2 (OXYGEN), Inhale 2 L/min At Night As Needed., Disp: , Rfl:   •  Omega-3 Fatty Acids (FISH OIL PO), Take 1,000 mg by mouth As Needed., Disp: , Rfl:   •  pantoprazole (PROTONIX) 40 MG EC tablet, Take 40 mg by mouth 2 (Two) Times a Day., Disp: , Rfl:   •  Sennosides (SENNO PO), Take  by mouth As Needed., Disp: , Rfl:   •  tiZANidine (ZANAFLEX) 4 MG tablet, Take 4 mg by mouth Every Night. 2 tablets at bedtime, Disp: , Rfl:   •  tolterodine LA (DETROL LA) 4 MG 24 hr capsule, Take 1 capsule  "by mouth daily., Disp: , Rfl:       History of Present Illness   Fani Bird is a 83 y.o. year old female here for follow up.    Pt denies any chest pain, dyspnea, dyspnea on exertion, orthopnea, PND, palpitations, lower extremity edema, or claudication.  Did have pneumonitis this summer, hospitalized < 1 week at OSH>      OBJECTIVE:  Vitals:    10/14/19 1327   BP: 126/60   BP Location: Right arm   Patient Position: Sitting   Pulse: 70   SpO2: (!) 82%   Weight: 57.2 kg (126 lb)   Height: 154.9 cm (61\")     Physical Exam   Constitutional: She appears well-developed and well-nourished.   Neck: Normal range of motion. Neck supple. No hepatojugular reflux and no JVD present. Carotid bruit is not present. No tracheal deviation present. No thyromegaly present.   Cardiovascular: Normal rate, regular rhythm, S1 normal, S2 normal, intact distal pulses and normal pulses. PMI is not displaced. Exam reveals no gallop, no distant heart sounds, no friction rub, no midsystolic click and no opening snap.   Murmur heard.  Pulses:       Radial pulses are 2+ on the right side, and 2+ on the left side.        Dorsalis pedis pulses are 2+ on the right side, and 2+ on the left side.        Posterior tibial pulses are 2+ on the right side, and 2+ on the left side.   Pulmonary/Chest: Effort normal and breath sounds normal. She has no wheezes. She has no rales.   Abdominal: Soft. Bowel sounds are normal. She exhibits no mass. There is no tenderness. There is no guarding.       Diagnostic Data:  Procedures      ASSESSMENT:   Diagnosis Plan   1. Coronary artery disease involving native coronary artery of native heart without angina pectoris     2. Chronic diastolic congestive heart failure (CMS/HCC)     3. Essential hypertension     4. Mixed hyperlipidemia         PLAN:  CAD post high risk PCI last year.  Asymptomatic ,, continue current rx    chf chronic diastolic.  Self titrates loop diuretic     htn controlled on coreg " /losartan    Hl on statin    Emerson Alvarez MD, thank you for referring Ms. Bird for evaluation.  I have forwarded my electronically generated recommendations to you for review.  Please do not hesitate to call with any questions.      Chinmay Whitley MD, FACC

## 2019-11-24 NOTE — CONSULTS
Consult Note  Fani Bird  [id number]  1935    Referring Provider:  Reason for Consultation: Coronary artery disease    Patient Care Team:  Emerson Alvarez MD as PCP - General (Family Medicine)  Emerson Alvarez MD as PCP - Claims Attributed    Chief complaint: Chest pain, non-STEMI myocardial infarction      History of present illness: Patient presents with chest discomfort and pain.  The patient has been admitted with a non-STEMI myocardial infarction.  Abdomen acid patient about surgical opinion.  The patient is very limited.  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  The patient also has severe arthritis and can barely get around with a walker.    Review of Systems    The following systems were reviewed and negative;  eyes, ENT, gastrointestinal, genitourinary, integument, breast, hematologic / lymphatic, behavioral/psych, endocrine and allergies / immunologic    History  Past Medical History:   Diagnosis Date   • Cataract    • Cerebrovascular disease    • Chest pain    • Coronary artery disease     no recent ischemic evaluation   • Diabetes mellitus (CMS/[facility])     Hemoglobin A1c of 8.3.   • Dyslipidemia     May 2012 - Total 156, triglycerides 297, HDL 47, and LDL 54.   • Dyspnea    • GERD (gastroesophageal reflux disease)    • Hypertension    • Pneumonia    • Swelling of lower extremity     improved with stockings   • Vitamin D deficiency    , Past Surgical History:   Procedure Laterality Date   • BLADDER REPAIR     • BREAST BIOPSY     • BREAST CYST ASPIRATION     • CARDIAC CATHETERIZATION     • CARPAL TUNNEL RELEASE Bilateral    • CATARACT EXTRACTION     • CHOLECYSTECTOMY     • CORONARY STENT PLACEMENT     • HYSTERECTOMY     • SPINAL FUSION     , Family History   Problem Relation Age of Onset   • Heart disease Mother    • Hypertension Mother    • Heart disease Father    • Hypertension Father    • Heart attack Father    • Diabetes Father    • Diabetes Brother    • Liver cancer Brother    •  Hypertension Brother    • Stroke Maternal Grandmother    • Heart attack Maternal Grandmother    • Stroke Maternal Grandfather    • No Known Problems Paternal Grandmother    • No Known Problems Paternal Grandfather    , Social History     Tobacco Use   • Smoking status: Never Smoker   • Smokeless tobacco: Never Used   Substance Use Topics   • Alcohol use: No   • Drug use: No   , Medications Prior to Admission   Medication Sig Dispense Refill Last Dose   • amLODIPine (NORVASC) 10 MG tablet Take 10 mg by mouth Daily.   Past Week at Unknown time   • ascorbic acid (VITAMIN C) 1000 MG tablet Take 1 tablet by mouth Daily.   Past Week at Unknown time   • aspirin 81 MG tablet Take  by mouth daily.   Past Week at Unknown time   • atorvastatin (LIPITOR) 10 MG tablet Take 20 mg by mouth Daily.   Past Week at Unknown time   • celecoxib (CELEBREX) 200 MG capsule Take  by mouth Daily.   Past Week at Unknown time   • chlorthalidone (HYGROTON) 25 MG tablet Take 1 tablet by mouth Daily. 90 tablet 2 Past Week at Unknown time   • Cholecalciferol (VITAMIN D PO) Take  by mouth daily.   Past Week at Unknown time   • Cranberry 1000 MG capsule Take  by mouth Daily.   Past Week at Unknown time   • diazepam (VALIUM) 2 MG tablet Take 2 mg by mouth Every Night.   Past Week at Unknown time   • estropipate (OGEN) 1.5 MG tablet Take  by mouth daily.   Past Week at Unknown time   • ferrous sulfate 325 (65 FE) MG tablet Take  by mouth Daily. 2 daily   Past Week at Unknown time   • fexofenadine (ALLEGRA) 180 MG tablet Take 180 mg by mouth 2 (Two) Times a Day.   Past Week at Unknown time   • fluticasone (FLOVENT HFA) 220 MCG/ACT inhaler daily.   Past Week at Unknown time   • gabapentin (NEURONTIN) 400 MG capsule Take 400 mg by mouth 4 (Four) Times a Day.   Past Week at Unknown time   • insulin aspart prot-insulin aspart (novoLOG 70/30) (70-30) 100 UNIT/ML injection Inject  under the skin into the appropriate area as directed 2 (Two) Times a Day With  Meals. 34 in morning and 22 a night   Past Week at Unknown time   • losartan (COZAAR) 100 MG tablet Take  by mouth daily.   Past Week at Unknown time   • mometasone (NASONEX) 50 MCG/ACT nasal spray 2 sprays into each nostril Daily.   Past Week at Unknown time   • montelukast (SINGULAIR) 10 MG tablet Take 10 mg by mouth Every Night.   Past Week at Unknown time   • Morphine (MS CONTIN) 30 MG 12 hr tablet Take 30 mg by mouth 2 (Two) Times a Day.   Past Week at Unknown time   • Morphine (MSIR) 15 MG tablet Take 15 mg by mouth Every 4 (Four) Hours As Needed for Severe Pain .   Past Week at Unknown time   • Multiple Vitamins-Minerals (EYE VITAMINS PO) Take  by mouth Daily.   Taking   • nitrofurantoin, macrocrystal-monohydrate, (MACROBID) 100 MG capsule Take 50 mg by mouth Daily.   Past Week at Unknown time   • nitroglycerin (NITROSTAT) 0.4 MG SL tablet Place 1 tablet under the tongue Every 5 (Five) Minutes As Needed for Chest Pain. 25 tablet 3 Past Week at Unknown time   • O2 (OXYGEN) Inhale 2 L/min Every Night.   Past Week at Unknown time   • Omega-3 Fatty Acids (FISH OIL PO) Take  by mouth daily.   Past Week at Unknown time   • pantoprazole (PROTONIX) 40 MG EC tablet Take  by mouth 2 (two) times a day.   Past Week at Unknown time   • tiZANidine (ZANAFLEX) 4 MG tablet Take  by mouth Daily.   Past Week at Unknown time   • tolterodine LA (DETROL LA) 4 MG 24 hr capsule Take 1 capsule by mouth daily.   Past Week at Unknown time   • albuterol (PROVENTIL) (2.5 MG/3ML) 0.083% nebulizer solution Take 2.5 mg by nebulization Every 4 (Four) Hours As Needed.   Taking   • albuterol (VENTOLIN HFA) 108 (90 BASE) MCG/ACT inhaler 2 (Two) Times a Day.   Taking   • carBAMazepine (TEGRETOL) 200 MG tablet Take 100 mg by mouth 2 (Two) Times a Day.   Taking   • Cyanocobalamin (VITAMIN B-12 PO) Take 1 tablet by mouth 1 (one) time per week.   Taking   • cycloSPORINE (RESTASIS) 0.05 % ophthalmic emulsion Apply  to eye 2 (two) times a day.    "Taking   • Multiple Vitamins-Minerals (ICAPS AREDS 2 PO) Take  by mouth Daily.   Taking      Scheduled Meds:    amLODIPine 10 mg Oral Daily   aspirin 81 mg Oral Daily   atorvastatin 80 mg Oral Daily   budesonide 0.5 mg Nebulization BID - RT   carBAMazepine 100 mg Oral BID   carvedilol 3.125 mg Oral Q12H   chlorthalidone 25 mg Oral Daily   cholecalciferol 1,000 Units Oral Daily   clopidogrel 75 mg Oral Daily   cycloSPORINE 1 drop Both Eyes BID   diazePAM 2 mg Oral Nightly   enoxaparin 1 mg/kg Subcutaneous Q12H   ferrous sulfate 650 mg Oral Daily With Breakfast   fexofenadine 180 mg Oral Q12H   gabapentin 400 mg Oral 4x Daily   insulin detemir 12 Units Subcutaneous Q12H   insulin lispro 0-7 Units Subcutaneous 4x Daily With Meals & Nightly   insulin lispro 5 Units Subcutaneous TID With Meals   losartan 100 mg Oral Daily   montelukast 10 mg Oral Nightly   Morphine 30 mg Oral Q12H   OCUVITE-LUTEIN 1 tablet Oral Daily   oxybutynin XL 5 mg Oral Daily   pantoprazole 40 mg Oral Q AM   sodium chloride 3 mL Intravenous Q12H   tiZANidine 4 mg Oral Nightly   vitamin B-12 1,000 mcg Oral Daily   ascorbic acid 1,000 mg Oral Daily      Continuous Infusions:    nitroglycerin 10-50 mcg/min Last Rate: Stopped (11/10/18 1625)   sodium chloride 1-3 mL/kg/hr Last Rate: Stopped (11/10/18 1745)      PRN Meds:  •  acetaminophen  •  albuterol  •  dextrose  •  dextrose  •  glucagon (human recombinant)  •  hydrALAZINE  •  nitroglycerin  •  ondansetron **OR** ondansetron  •  sodium chloride and Allergies:  Isosorbide and Augmentin [amoxicillin-pot clavulanate]    Objective     Vital Sign Min/Max for last 24 hours  Temp  Min: 97.7 °F (36.5 °C)  Max: 98.9 °F (37.2 °C)   BP  Min: 125/50  Max: 179/88   Pulse  Min: 66  Max: 84   Resp  Min: 16  Max: 18   SpO2  Min: 95 %  Max: 96 %   No Data Recorded   No Data Recorded     Flowsheet Rows      First Filed Value   Admission Height  154.9 cm (60.98\") Documented at 11/12/2018 0456   Admission Weight  57.6 " kg (127 lb) Documented at 11/09/2018 2300          Physical Exam:     General Appearance:    Alert, cooperative, in no acute distress   Head:    Normocephalic, without obvious abnormality, atraumatic   Eyes:            Lids and lashes normal, conjunctivae and sclerae normal, no   icterus, no pallor, corneas clear, PERRLA   Ears:    Ears appear intact with no abnormalities noted   Throat:   No oral lesions, no thrush, oral mucosa moist   Neck:   No adenopathy, supple, trachea midline, no thyromegaly, no   carotid bruit, no JVD   Back:     No kyphosis present, no scoliosis present, no skin lesions,      erythema or scars, no tenderness to percussion or                   palpation,   range of motion normal   Lungs:     Clear to auscultation,respirations regular, even and                  unlabored    Heart:    Regular rhythm and normal rate, normal S1 and S2, no            murmur, no gallop, no rub, no click   Chest Wall:    No abnormalities observed   Abdomen:     Normal bowel sounds, no masses, no organomegaly, soft        non-tender, non-distended, no guarding, no rebound                tenderness   Rectal:     Deferred   Extremities:   Moves all extremities well, no edema, no cyanosis, no             redness   Pulses:   Pulses palpable and equal bilaterally   Skin:   No bleeding, bruising or rash   Lymph nodes:   No palpable adenopathy   Neurologic:   Cranial nerves 2 - 12 grossly intact, sensation intact, DTR       present and equal bilaterally             Assessment/Plan       NSTEMI (non-ST elevated myocardial infarction) (CMS/HCC)    Chest pain    Cerebrovascular disease    Coronary artery disease    Hypertension    Dyslipidemia    GERD (gastroesophageal reflux disease)    Diabetes mellitus (CMS/HCC)      I have obtained and reviewed her cardiac catheterization films.  She has approximately 80% left main stenosis, circumflex, LAD.  She is also very debilitated as has been documented in multiple notes.  After  careful review of the medical record and examination of the patient I would concur with the nonoperative management of this patient.  Certainly a catheter-based intervention might be an option but I will leave that to cardiology and Dr. chairez.  At this point I will see back as needed.  I would like to thank you for this consultation.    I discussed the patients findings and my recommendations with patient      Please note that portions of this note were completed with a voice recognition program. Efforts were made to edit the dictations, but words may be mistranscribed    Chavez Cornejo MD  11/13/18  7:05 AM               [[email or web address]] :  health maintenance visit

## 2019-12-16 NOTE — TELEPHONE ENCOUNTER
Pt daughter called to let us know that patient has been hospitalized for chest pain at [facility] in [place] and will get records sent to us.

## 2019-12-17 NOTE — TELEPHONE ENCOUNTER
Pt daughter called and stated her mom had been admitted this past weekend at [facility] for chest pain. Today she had a new a stress test and echo which per family the doctor shows a new blockage and decreased left side heart function. Per the doctor down there patient needs a heart cath before holidays. Records have been requested. Please advise

## 2020-01-01 ENCOUNTER — OFFICE VISIT (OUTPATIENT)
Dept: NEUROSURGERY | Facility: CLINIC | Age: 85
End: 2020-01-01

## 2020-01-01 ENCOUNTER — TELEPHONE (OUTPATIENT)
Dept: CARDIOLOGY | Facility: CLINIC | Age: 85
End: 2020-01-01

## 2020-01-01 ENCOUNTER — TELEPHONE (OUTPATIENT)
Dept: NEUROSURGERY | Facility: CLINIC | Age: 85
End: 2020-01-01

## 2020-01-01 ENCOUNTER — OFFICE VISIT (OUTPATIENT)
Dept: UROLOGY | Facility: CLINIC | Age: 85
End: 2020-01-01

## 2020-01-01 ENCOUNTER — OFFICE VISIT (OUTPATIENT)
Dept: OBSTETRICS AND GYNECOLOGY | Facility: CLINIC | Age: 85
End: 2020-01-01

## 2020-01-01 ENCOUNTER — OFFICE VISIT (OUTPATIENT)
Dept: CARDIOLOGY | Facility: CLINIC | Age: 85
End: 2020-01-01

## 2020-01-01 ENCOUNTER — TELEPHONE (OUTPATIENT)
Dept: UROLOGY | Facility: CLINIC | Age: 85
End: 2020-01-01

## 2020-01-01 ENCOUNTER — PROCEDURE VISIT (OUTPATIENT)
Dept: UROLOGY | Facility: CLINIC | Age: 85
End: 2020-01-01

## 2020-01-01 VITALS
DIASTOLIC BLOOD PRESSURE: 64 MMHG | SYSTOLIC BLOOD PRESSURE: 122 MMHG | HEIGHT: 62 IN | BODY MASS INDEX: 23.55 KG/M2 | WEIGHT: 128 LBS

## 2020-01-01 VITALS — BODY MASS INDEX: 23.98 KG/M2 | HEIGHT: 61 IN | TEMPERATURE: 98.3 F | WEIGHT: 127 LBS

## 2020-01-01 VITALS
WEIGHT: 128 LBS | SYSTOLIC BLOOD PRESSURE: 126 MMHG | BODY MASS INDEX: 23.55 KG/M2 | DIASTOLIC BLOOD PRESSURE: 84 MMHG | HEIGHT: 62 IN

## 2020-01-01 VITALS — HEIGHT: 61 IN | BODY MASS INDEX: 23.81 KG/M2

## 2020-01-01 VITALS — BODY MASS INDEX: 23.37 KG/M2 | WEIGHT: 127 LBS | HEIGHT: 62 IN | TEMPERATURE: 97.3 F

## 2020-01-01 VITALS
DIASTOLIC BLOOD PRESSURE: 68 MMHG | TEMPERATURE: 98.2 F | WEIGHT: 124 LBS | SYSTOLIC BLOOD PRESSURE: 136 MMHG | HEIGHT: 62 IN | BODY MASS INDEX: 22.82 KG/M2

## 2020-01-01 VITALS — TEMPERATURE: 94.6 F | HEIGHT: 62 IN | WEIGHT: 128.09 LBS | BODY MASS INDEX: 23.57 KG/M2

## 2020-01-01 VITALS — WEIGHT: 124 LBS | HEIGHT: 62 IN | BODY MASS INDEX: 22.82 KG/M2

## 2020-01-01 DIAGNOSIS — N95.2 POST-MENOPAUSAL ATROPHIC VAGINITIS: ICD-10-CM

## 2020-01-01 DIAGNOSIS — N95.2 VAGINAL ATROPHY: ICD-10-CM

## 2020-01-01 DIAGNOSIS — M50.30 DEGENERATIVE DISC DISEASE, CERVICAL: ICD-10-CM

## 2020-01-01 DIAGNOSIS — Z00.00 HEALTH CARE MAINTENANCE: ICD-10-CM

## 2020-01-01 DIAGNOSIS — Z46.89 ENCOUNTER FOR FITTING AND ADJUSTMENT OF PESSARY: ICD-10-CM

## 2020-01-01 DIAGNOSIS — N39.3 SUI (STRESS URINARY INCONTINENCE, FEMALE): ICD-10-CM

## 2020-01-01 DIAGNOSIS — N81.9 OVERACTIVE BLADDER DUE TO PROLAPSE OF FEMALE GENITAL ORGAN: ICD-10-CM

## 2020-01-01 DIAGNOSIS — N39.46 URINARY INCONTINENCE, MIXED: ICD-10-CM

## 2020-01-01 DIAGNOSIS — Z87.440 HISTORY OF RECURRENT UTIS: ICD-10-CM

## 2020-01-01 DIAGNOSIS — N32.81 DETRUSOR HYPERREFLEXIA: ICD-10-CM

## 2020-01-01 DIAGNOSIS — R31.9 HEMATURIA, UNSPECIFIED TYPE: Primary | ICD-10-CM

## 2020-01-01 DIAGNOSIS — R35.0 FREQUENCY OF MICTURITION: ICD-10-CM

## 2020-01-01 DIAGNOSIS — N39.46 MIXED INCONTINENCE: Primary | ICD-10-CM

## 2020-01-01 DIAGNOSIS — R30.0 DYSURIA: Primary | ICD-10-CM

## 2020-01-01 DIAGNOSIS — R33.9 INCOMPLETE BLADDER EMPTYING: ICD-10-CM

## 2020-01-01 DIAGNOSIS — I25.10 CORONARY ARTERY DISEASE INVOLVING NATIVE CORONARY ARTERY OF NATIVE HEART WITHOUT ANGINA PECTORIS: Primary | ICD-10-CM

## 2020-01-01 DIAGNOSIS — I10 ESSENTIAL HYPERTENSION: ICD-10-CM

## 2020-01-01 DIAGNOSIS — M51.36 DEGENERATIVE DISC DISEASE, LUMBAR: Primary | ICD-10-CM

## 2020-01-01 DIAGNOSIS — Z48.816 AFTERCARE FOLLOWING SURGERY OF THE GENITOURINARY SYSTEM: Primary | ICD-10-CM

## 2020-01-01 DIAGNOSIS — N39.46 URINARY INCONTINENCE, MIXED: Primary | ICD-10-CM

## 2020-01-01 DIAGNOSIS — E78.2 MIXED HYPERLIPIDEMIA: ICD-10-CM

## 2020-01-01 DIAGNOSIS — N32.81 OVERACTIVE BLADDER DUE TO PROLAPSE OF FEMALE GENITAL ORGAN: ICD-10-CM

## 2020-01-01 DIAGNOSIS — N81.9 VAGINAL VAULT PROLAPSE: Primary | ICD-10-CM

## 2020-01-01 DIAGNOSIS — N81.9 VAGINAL VAULT PROLAPSE: ICD-10-CM

## 2020-01-01 LAB
BACTERIA UR CULT: NO GROWTH
BACTERIA UR CULT: NO GROWTH
BACTERIA UR CULT: NORMAL
BACTERIA UR CULT: NORMAL
BILIRUB BLD-MCNC: NEGATIVE MG/DL
CLARITY, POC: CLEAR
COLOR UR: YELLOW
GLUCOSE UR STRIP-MCNC: ABNORMAL MG/DL
KETONES UR QL: NEGATIVE
LEUKOCYTE EST, POC: NEGATIVE
NITRITE UR-MCNC: NEGATIVE MG/ML
PH UR: 8 [PH] (ref 5–8)
PROT UR STRIP-MCNC: ABNORMAL MG/DL
RBC # UR STRIP: NEGATIVE /UL
SP GR UR: 1.01 (ref 1–1.03)
UROBILINOGEN UR QL: NORMAL

## 2020-01-01 PROCEDURE — 99213 OFFICE O/P EST LOW 20 MIN: CPT | Performed by: UROLOGY

## 2020-01-01 PROCEDURE — 99213 OFFICE O/P EST LOW 20 MIN: CPT | Performed by: OBSTETRICS & GYNECOLOGY

## 2020-01-01 PROCEDURE — 81003 URINALYSIS AUTO W/O SCOPE: CPT | Performed by: NURSE PRACTITIONER

## 2020-01-01 PROCEDURE — 99203 OFFICE O/P NEW LOW 30 MIN: CPT | Performed by: NEUROLOGICAL SURGERY

## 2020-01-01 PROCEDURE — 96372 THER/PROPH/DIAG INJ SC/IM: CPT | Performed by: UROLOGY

## 2020-01-01 PROCEDURE — 52000 CYSTOURETHROSCOPY: CPT | Performed by: UROLOGY

## 2020-01-01 PROCEDURE — 51725 SIMPLE CYSTOMETROGRAM: CPT | Performed by: UROLOGY

## 2020-01-01 PROCEDURE — 99203 OFFICE O/P NEW LOW 30 MIN: CPT | Performed by: OBSTETRICS & GYNECOLOGY

## 2020-01-01 PROCEDURE — 99442 PR PHYS/QHP TELEPHONE EVALUATION 11-20 MIN: CPT | Performed by: INTERNAL MEDICINE

## 2020-01-01 PROCEDURE — 99214 OFFICE O/P EST MOD 30 MIN: CPT | Performed by: NURSE PRACTITIONER

## 2020-01-01 PROCEDURE — 57160 INSERT PESSARY/OTHER DEVICE: CPT | Performed by: OBSTETRICS & GYNECOLOGY

## 2020-01-01 RX ORDER — CLOPIDOGREL BISULFATE 75 MG/1
75 TABLET ORAL DAILY
Qty: 90 TABLET | Refills: 3 | Status: SHIPPED | OUTPATIENT
Start: 2020-01-01 | End: 2020-01-01 | Stop reason: SDUPTHER

## 2020-01-01 RX ORDER — CLOPIDOGREL BISULFATE 75 MG/1
75 TABLET ORAL DAILY
Qty: 90 TABLET | Refills: 3 | Status: SHIPPED | OUTPATIENT
Start: 2020-01-01

## 2020-01-01 RX ORDER — LOSARTAN POTASSIUM 100 MG/1
100 TABLET ORAL DAILY
Qty: 90 TABLET | Refills: 3 | Status: SHIPPED | OUTPATIENT
Start: 2020-01-01

## 2020-01-01 RX ORDER — NITROGLYCERIN 0.4 MG/1
0.4 TABLET SUBLINGUAL
Qty: 25 TABLET | Refills: 3 | Status: SHIPPED | OUTPATIENT
Start: 2020-01-01

## 2020-01-01 RX ORDER — CEPHALEXIN 500 MG/1
CAPSULE ORAL
COMMUNITY
Start: 2020-01-01 | End: 2020-01-01

## 2020-01-01 RX ORDER — GENTAMICIN SULFATE 40 MG/ML
80 INJECTION, SOLUTION INTRAMUSCULAR; INTRAVENOUS ONCE
Status: COMPLETED | OUTPATIENT
Start: 2020-01-01 | End: 2020-01-01

## 2020-01-01 RX ORDER — CARVEDILOL 6.25 MG/1
6.25 TABLET ORAL 2 TIMES DAILY
Qty: 180 TABLET | Refills: 3 | Status: SHIPPED | OUTPATIENT
Start: 2020-01-01 | End: 2020-01-01 | Stop reason: SDUPTHER

## 2020-01-01 RX ORDER — ESTRADIOL 0.1 MG/G
2 CREAM VAGINAL DAILY
Qty: 42.5 G | Refills: 12 | Status: SHIPPED | OUTPATIENT
Start: 2020-01-01 | End: 2020-01-01

## 2020-01-01 RX ORDER — ESTRADIOL 0.5 MG/1
TABLET ORAL
COMMUNITY
Start: 2020-01-01 | End: 2020-01-01 | Stop reason: SDUPTHER

## 2020-01-01 RX ORDER — NITROFURANTOIN 25; 75 MG/1; MG/1
100 CAPSULE ORAL DAILY
Qty: 90 CAPSULE | Refills: 3 | Status: CANCELLED | OUTPATIENT
Start: 2020-01-01

## 2020-01-01 RX ORDER — FUROSEMIDE 20 MG/1
20 TABLET ORAL DAILY
Qty: 180 TABLET | Refills: 1 | Status: SHIPPED | OUTPATIENT
Start: 2020-01-01

## 2020-01-01 RX ORDER — FUROSEMIDE 20 MG/1
TABLET ORAL
Qty: 60 TABLET | Refills: 2 | Status: SHIPPED | OUTPATIENT
Start: 2020-01-01 | End: 2020-01-01

## 2020-01-01 RX ORDER — CARVEDILOL 6.25 MG/1
6.25 TABLET ORAL 2 TIMES DAILY
Qty: 180 TABLET | Refills: 3 | Status: SHIPPED | OUTPATIENT
Start: 2020-01-01

## 2020-01-01 RX ORDER — ATORVASTATIN CALCIUM 40 MG/1
40 TABLET, FILM COATED ORAL DAILY
Qty: 90 TABLET | Refills: 3 | Status: SHIPPED | OUTPATIENT
Start: 2020-01-01

## 2020-01-01 RX ORDER — POTASSIUM CHLORIDE 750 MG/1
10 TABLET, FILM COATED, EXTENDED RELEASE ORAL 2 TIMES DAILY
Qty: 180 TABLET | Refills: 3 | Status: SHIPPED | OUTPATIENT
Start: 2020-01-01

## 2020-01-01 RX ORDER — LACTULOSE 10 G/15ML
10 SOLUTION ORAL NIGHTLY
Qty: 1892 ML | Refills: 6 | Status: SHIPPED | OUTPATIENT
Start: 2020-01-01

## 2020-01-01 RX ORDER — AMLODIPINE BESYLATE 10 MG/1
10 TABLET ORAL DAILY
Qty: 90 TABLET | Refills: 3 | Status: SHIPPED | OUTPATIENT
Start: 2020-01-01

## 2020-01-01 RX ORDER — PHENAZOPYRIDINE HYDROCHLORIDE 100 MG/1
100 TABLET, FILM COATED ORAL 3 TIMES DAILY PRN
Qty: 20 TABLET | Refills: 0 | Status: SHIPPED | OUTPATIENT
Start: 2020-01-01 | End: 2020-01-01

## 2020-01-01 RX ORDER — MORPHINE SULFATE 15 MG/1
15 TABLET, FILM COATED, EXTENDED RELEASE ORAL 2 TIMES DAILY PRN
Qty: 60 TABLET | Refills: 0 | Status: SHIPPED | OUTPATIENT
Start: 2020-01-01

## 2020-01-01 RX ADMIN — GENTAMICIN SULFATE 80 MG: 40 INJECTION, SOLUTION INTRAMUSCULAR; INTRAVENOUS at 15:50

## 2020-02-04 NOTE — TELEPHONE ENCOUNTER
RN has called and left voice mails on patient home and mobile phone number as well as patient emergency contact phone number regarding a phone call received yesterday about medications and refills. No response as of yet.

## 2020-06-15 NOTE — PROGRESS NOTES
Chief Complaint:          Chief Complaint   Patient presents with   • Recurrent Urinary Tract Infections     Atrophic Vaginitis /Cystocele       HPI:   84 y.o. female.    Very Pleasant but frail Patient Presents to clinic today with her awesome DTR/ Care giver Yajaira..    She has been referred to us by her primary care Dr. Antonio Zayas,  with concerns about Gross Hematuria,  And  Recurrent UTI'S. She reports the numerous use of antibiotics in the last few months for UTI. However she is unsure about any documented positive urine cultures.     She reports her bouts of Gross hematuria has been persistent (recently last week).  She reports urine frequency, Urgency, Nocturia 2-3 times, pelvic pain, pressure and bladder pain/discomfort. She denies  abdominal pain, back pain and CVA tenderness. She denies burning on urination, No  Fevers/chills, no N/V/D. She has issues with severe constipation and reports straining a lot during bowel movements.     Her Urine dipstick today is completely negative for any infection( Currently taking  ABX Keflex), She does not have microscopic hematuria. Her PVR is 88cc, Then PVR is Zero after double voiding. She had been  placed on Estrace 0.5mg tab for Atrophic vaginitis.    She is a  with a significant PMHX as listed below.      Past Medical History:        Past Medical History:   Diagnosis Date   • Cataract    • Cerebrovascular disease    • Chest pain    • Coronary artery disease     no recent ischemic evaluation   • Diabetes mellitus (CMS/[facility])     Hemoglobin A1c of 8.3.   • Dyslipidemia     May 2012 - Total 156, triglycerides 297, HDL 47, and LDL 54.   • Dyspnea    • GERD (gastroesophageal reflux disease)    • Hypertension    • Pneumonia    • Swelling of lower extremity     improved with stockings   • Vitamin D deficiency        The following portions of the patient's history were reviewed and updated as appropriate: allergies, current medications, past family history, past medical  history, past social history, past surgical history and problem list.    Current Meds:     Current Outpatient Medications   Medication Sig Dispense Refill   • albuterol (PROVENTIL) (2.5 MG/3ML) 0.083% nebulizer solution Take 2.5 mg by nebulization Every 4 (Four) Hours As Needed.     • albuterol (VENTOLIN HFA) 108 (90 BASE) MCG/ACT inhaler Inhale 1 puff 2 (Two) Times a Day. Before taking Flovent     • amLODIPine (NORVASC) 10 MG tablet Take 1 tablet by mouth Daily. 90 tablet 3   • ascorbic acid (VITAMIN C) 1000 MG tablet Take 1 tablet by mouth Daily.     • aspirin 81 MG tablet Take 81 mg by mouth Daily.     • atorvastatin (LIPITOR) 80 MG tablet Take 1 tablet by mouth Daily. (Patient taking differently: Take 40 mg by mouth Daily.) 30 tablet 3   • carvedilol (COREG) 6.25 MG tablet Take 1 tablet by mouth 2 (Two) Times a Day. 180 tablet 3   • celecoxib (CELEBREX) 200 MG capsule Take 200 mg by mouth Daily.     • Cholecalciferol (VITAMIN D PO) Take 1,000 Units by mouth Daily.     • clonazePAM (KlonoPIN) 0.5 MG tablet Take 0.5 mg by mouth Daily.     • clopidogrel (PLAVIX) 75 MG tablet Take 1 tablet by mouth Daily. 90 tablet 3   • Cranberry 1000 MG capsule Take 1 capsule by mouth Daily.     • Cyanocobalamin (VITAMIN B-12 PO) Take 1 tablet by mouth 1 (One) Time Per Week. Taken on Saturday     • cycloSPORINE (RESTASIS) 0.05 % ophthalmic emulsion Apply 1 drop to eye(s) as directed by provider 2 (Two) Times a Day.     • estropipate (OGEN) 1.5 MG tablet Take 0.5 mg by mouth Daily.     • ferrous sulfate 325 (65 FE) MG tablet Take 325 mg by mouth Daily With Breakfast. 2 daily     • fexofenadine (ALLEGRA) 180 MG tablet Take 180 mg by mouth 2 (Two) Times a Day.     • fluticasone (FLOVENT HFA) 220 MCG/ACT inhaler Inhale 1 puff 2 (Two) Times a Day.     • furosemide (LASIX) 20 MG tablet Take 1-2 tablets daily as directed 60 tablet 2   • gabapentin (NEURONTIN) 400 MG capsule Take 400 mg by mouth 4 (Four) Times a Day.     • insulin aspart  prot-insulin aspart (novoLOG 70/30) (70-30) 100 UNIT/ML injection Inject 26 Units under the skin into the appropriate area as directed Every Morning Before Breakfast.     • insulin aspart prot-insulin aspart (novoLOG 70/30) (70-30) 100 UNIT/ML injection Inject 22 Units under the skin into the appropriate area as directed Daily Before Supper.     • losartan (COZAAR) 50 MG tablet Take 1 tablet by mouth Daily. (Patient taking differently: Take 100 mg by mouth Daily.) 30 tablet 0   • Magnesium 400 MG tablet Take 1 tablet by mouth Daily. 30 tablet 11   • mometasone (NASONEX) 50 MCG/ACT nasal spray 2 sprays into the nostril(s) as directed by provider Every Evening.     • montelukast (SINGULAIR) 10 MG tablet Take 10 mg by mouth Every Night.     • Morphine (MS CONTIN) 30 MG 12 hr tablet Take 30 mg by mouth 2 (Two) Times a Day.     • nitrofurantoin (MACRODANTIN) 50 MG capsule Take 50 mg by mouth Every Night.     • nitroglycerin (NITROSTAT) 0.4 MG SL tablet Place 1 tablet under the tongue Every 5 (Five) Minutes As Needed for Chest Pain. 25 tablet 3   • O2 (OXYGEN) Inhale 2 L/min At Night As Needed.     • Omega-3 Fatty Acids (FISH OIL PO) Take 1,000 mg by mouth As Needed.     • pantoprazole (PROTONIX) 40 MG EC tablet Take 40 mg by mouth 2 (Two) Times a Day.     • potassium chloride (K-DUR) 10 MEQ CR tablet Take 1 tablet by mouth 2 (Two) Times a Day. 180 tablet 3   • Sennosides (SENNO PO) Take  by mouth As Needed.     • tiZANidine (ZANAFLEX) 4 MG tablet Take 4 mg by mouth Every Night. 2 tablets at bedtime     • tolterodine LA (DETROL LA) 4 MG 24 hr capsule Take 1 capsule by mouth daily.     • cephalexin (KEFLEX) 500 MG capsule      • estradiol (ESTRACE) 0.5 MG tablet      • Insulin Glargine (Lantus SoloStar) 100 UNIT/ML injection pen Lantus Solostar U-100 Insulin 100 unit/mL (3 mL) subcutaneous pen       No current facility-administered medications for this visit.         Allergies:      Allergies   Allergen Reactions   •  Isosorbide Hives   • Augmentin [Amoxicillin-Pot Clavulanate] Diarrhea and Rash        Past Surgical History:     Past Surgical History:   Procedure Laterality Date   • BLADDER REPAIR     • BREAST BIOPSY     • BREAST CYST ASPIRATION     • CARDIAC CATHETERIZATION     • CARDIAC CATHETERIZATION Left 11/10/2018    Procedure: Cardiac Catheterization/Vascular Study;  Surgeon: Bethel Mercedes MD;  Location: [facility] CATH INVASIVE LOCATION;  Service: Cardiology   • CARPAL TUNNEL RELEASE Bilateral    • CATARACT EXTRACTION     • CATARACT EXTRACTION  2018    right eye    • CHOLECYSTECTOMY     • CORONARY STENT PLACEMENT     • HYSTERECTOMY     • NJ RT/LT HEART CATHETERS N/A 11/28/2018    Procedure: PERCUTANEOUS CORONARY INTERVENTION;  Surgeon: Bethel Mercedes MD;  Location:  [facility] CATH INVASIVE LOCATION;  Service: Cardiology   • SPINAL FUSION           Social History:     Social History     Socioeconomic History   • Marital status:      Spouse name: Not on file   • Number of children: Not on file   • Years of education: Not on file   • Highest education level: Not on file   Occupational History   • Occupation: retired   Tobacco Use   • Smoking status: Never Smoker   • Smokeless tobacco: Never Used   Substance and Sexual Activity   • Alcohol use: No   • Drug use: No   • Sexual activity: Defer   Social History Narrative    Caffeine: 3 servings per day    Patient lives with her jonahher       Family History:     Family History   Problem Relation Age of Onset   • Heart disease Mother    • Hypertension Mother    • Heart disease Father    • Hypertension Father    • Heart attack Father    • Diabetes Father    • Diabetes Brother    • Liver cancer Brother    • Hypertension Brother    • Stroke Maternal Grandmother    • Heart attack Maternal Grandmother    • Stroke Maternal Grandfather    • No Known Problems Paternal Grandmother    • No Known Problems Paternal Grandfather        Review of Systems:       Physical Exam:     Physical Exam    Constitutional: She is oriented to person, place, and time. She appears well-developed and well-nourished. She appears distressed.   HENT:   Head: Normocephalic and atraumatic.   Eyes: Pupils are equal, round, and reactive to light. EOM are normal.   Neck: Normal range of motion. No tracheal deviation present. No thyromegaly present.   Cardiovascular: Normal rate and regular rhythm.   No murmur heard.  Pulmonary/Chest: Breath sounds normal. No stridor. She is in respiratory distress ( pt has sob, exertional dyspnea). She has no wheezes.   SOB with Exertional Dyspnea   Abdominal: Soft. Bowel sounds are normal. There is tenderness.   Genitourinary: Vagina normal. There is no tenderness on the right labia. There is no tenderness on the left labia. No vaginal discharge found.   Genitourinary Comments: Soft nontender abdomen with no organomegaly, rigidity, guarding or tenderness.  AbNormal vaginal orifice with Grade 3 Cystocele, dry/irritated vaginal mucosa.  She has   leakage with Valsalva.  No significant perineal body abnormalities and a normal external anus.      Musculoskeletal: Normal range of motion. She exhibits tenderness. She exhibits no edema or deformity.   She is very weak,   Her gait is very unsteady, she uses a rolling walker   Neurological: She is alert and oriented to person, place, and time. No cranial nerve deficit or sensory deficit. Coordination normal.   Skin: Skin is warm and dry. Capillary refill takes less than 2 seconds. No rash noted. No erythema. There is pallor.   Psychiatric: She has a normal mood and affect. Her behavior is normal. Judgment and thought content normal.       Procedure:     No notes on file      Assessment:     Encounter Diagnoses   Name Primary?   • Hematuria, unspecified type Yes   • History of recurrent UTIs    • Frequency of micturition    • Post-menopausal atrophic vaginitis    • Overactive bladder due to prolapse of female genital organ      Orders Placed This  Encounter   Procedures   • Urine Culture - Urine, Urine, Random Void   • POC Urinalysis Dipstick, Automated       Plan:   ASSESSMENT  Recurrent UTI's/Gross Hematuria/ Atrophic Vaginitis / Cystocele: Patient has been  diagnosed with Gross Hematuria, Recurrent urinary tract infections and  referred to us by her primary care. She has numerous other concerns as listed above.    She is symptomatic today with frequency, Urgency, and having to strain for bowel movements, and also to urinate due to her cystocele.  She states she has to frequently manipulate her cystocele.  She has vaginal pain/discomfort, and reports feeling unwell. She is finishing up antibiotics for her most recent UTI Keflex.     We reviewed and discussed her most recent CT of abdomen and pelvis done at [facility] on February, 21st 2020, showed extensive constipation and fecal impaction in the rectal vault, and a very distended urinary bladder.  Her PVR today was 88, then 0 after double voiding.    We discussed the types of organisms that are found in the urinary tract indicating that the vast majority are results of the patient's own gastrointestinal rosa.  We discussed how many of the antibiotics that are utilized can actually exacerbate these infections by creating resistant organisms and there is only a very few antibiotics(Nitrofurantoin), that are concentrated in the urine and do not affect the rectal reservoir nor cause recurrent yeast vaginitis.      We discussed the risk factors for recurrent infections being intercourse in younger patients and atrophic changes in older patients.  We discussed the symptoms that are found including pain, pressure, burning, frequency, urgency suprapubic pain .     I discussed upper tract symptoms including fevers, chills, and indicated the workup would be much more aggressive if the patient were to present with recurrent infections in the face of upper tract symptomatology such as fever.     We also  Discussed Microscopic VS Gross Hematuria with patient. She has been very symptomatic and has some gross hematuria last episode one week ago. We discussed the possible causes such as infection in the bladder, kidney, or bladder discomfort, Different Cancers,  trauma. vigorous exercise, viral illness, such as hepatitis a virus that causes liver disease, and inflammation of the liver.    PLAN   We discussed the use of both an upper tract (CT SCAN),  and lower tract investigation. We discussed the lower tract investigation consisting of a cystoscopy.    Will Send her Urine for Culture( will call her with results if  not sensitive to current ABX).    Discussed Antibiotic Suppressive therapy with Macrobid nightly.    Discussed daily use of MiraLAX as tolerated/PRN Dulcolax (GI Referral).     I recommend concomitant probiotics with treatment with antibiotics to protect the rectal reservoir including over-the-counter yogurt preparations to venkatesh oral pills containing the appropriate probiotics.    Start Estrace Vaginal Cream nightly  as discussed for Atrophic Vaginitis.    I recommend a Vaginal Pessary - Patient is manually repositioning cystocele prior to voiding /Bowel movements secondary to blockage.(She Refused Surgical Correction due to failing health).    Patient has been scheduled  for Cystoscopy with Dr. Cano on 07/30/2020    Pt/DTr agreeable with plan of care.    Patient's Body mass index is 23.81 kg/m². BMI is within normal parameters. No follow-up required..    Smoking Cessation Counseling:  Never a smoker.  Patient does not currently use any tobacco products.      Counseling was given to patient for the following topics diagnostic results including: Recurrent UTI, Hematuria, Cystocele,Atrophic Vaginitis, instructions for management as follows: increase PO fluids 6-10glasses daily, Better Diabetes control, cont ABX Macrobid daily, Probiotics, Estrace cream, risk factor reductions including: Bladder irritants  such as caffiene products, citrusy foods, spicy foods, sugars, constipation and impressions as follows: Cystoscocpy With Dr. Caon. The interim medical history and current results were reviewed.  A treatment plan with follow-up was made for Recurrent UTIs, Cystocele,  Hematuria, unspecified type [R31.9].         This document has been electronically signed by Griselda Cheng-Akwa, APRN Susanne 15, 2020 12:22

## 2020-06-17 NOTE — TELEPHONE ENCOUNTER
Called patients dtr to check on how she was doing post hospital visit.  We discussed her urine culture was negative for any growth. Pt has dysuria and burning with urination.    Called in Pyridium 100 mg to use PRN.    She is to continue macrobid 50mg nightly for suppressive therapy.    Will follow up with OBGYN for Pessary as recommended,    Dtr/pt appreciative of call.  jerrell ames

## 2020-06-17 NOTE — PATIENT INSTRUCTIONS
Pelvic Organ Prolapse  Pelvic organ prolapse is the stretching, bulging, or dropping of pelvic organs into an abnormal position. It happens when the muscles and tissues that surround and support pelvic structures become weak or stretched. Pelvic organ prolapse can involve the:  · Vagina (vaginal prolapse).  · Uterus (uterine prolapse).  · Bladder (cystocele).  · Rectum (rectocele).  · Intestines (enterocele).  When organs other than the vagina are involved, they often bulge into the vagina or protrude from the vagina, depending on how severe the prolapse is.  What are the causes?  This condition may be caused by:  · Pregnancy, labor, and childbirth.  · Past pelvic surgery.  · Decreased production of the hormone estrogen associated with menopause.  · Consistently lifting more than 50 lb (23 kg).  · Obesity.  · Long-term inability to pass stool (chronic constipation).  · A cough that lasts a long time (chronic).  · Buildup of fluid in the abdomen due to certain diseases and other conditions.  What are the signs or symptoms?  Symptoms of this condition include:  · Passing a little urine (loss of bladder control) when you cough, sneeze, strain, and exercise (stress incontinence). This may be worse immediately after childbirth. It may gradually improve over time.  · Feeling pressure in your pelvis or vagina. This pressure may increase when you cough or when you are passing stool.  · A bulge that protrudes from the opening of your vagina.  · Difficulty passing urine or stool.  · Pain in your lower back.  · Pain, discomfort, or disinterest in sex.  · Repeated bladder infections (urinary tract infections).  · Difficulty inserting a tampon.  In some people, this condition causes no symptoms.  How is this diagnosed?  This condition may be diagnosed based on a vaginal and rectal exam. During the exam, you may be asked to cough and strain while you are lying down, sitting, and standing up. Your health care provider will  determine if other tests are required, such as bladder function tests.  How is this treated?  Treatment for this condition may depend on your symptoms. Treatment may include:  · Lifestyle changes, such as changes to your diet.  · Emptying your bladder at scheduled times (bladder training therapy). This can help reduce or avoid urinary incontinence.  · Estrogen. Estrogen may help mild prolapse by increasing the strength and tone of pelvic floor muscles.  · Kegel exercises. These may help mild cases of prolapse by strengthening and tightening the muscles of the pelvic floor.  · A soft, flexible device that helps support the vaginal walls and keep pelvic organs in place (pessary). This is inserted into your vagina by your health care provider.  · Surgery. This is often the only form of treatment for severe prolapse.  Follow these instructions at home:  · Avoid drinking beverages that contain caffeine or alcohol.  · Increase your intake of high-fiber foods. This can help decrease constipation and straining during bowel movements.  · Lose weight if recommended by your health care provider.  · Wear a sanitary pad or adult diapers if you have urinary incontinence.  · Avoid heavy lifting and straining with exercise and work. Do not hold your breath when you perform mild to moderate lifting and exercise activities. Limit your activities as directed by your health care provider.  · Do Kegel exercises as directed by your health care provider. To do this:  ? Squeeze your pelvic floor muscles tight. You should feel a tight lift in your rectal area and a tightness in your vaginal area. Keep your stomach, buttocks, and legs relaxed.  ? Hold the muscles tight for up to 10 seconds.  ? Relax your muscles.  ? Repeat this exercise 50 times a day, or as many times as told by your health care provider. Continue to do this exercise for at least 4-6 weeks, or for as long as told by your health care provider.  · Take over-the-counter and  prescription medicines only as told by your health care provider.  · If you have a pessary, take care of it as told by your health care provider.  · Keep all follow-up visits as told by your health care provider. This is important.  Contact a health care provider if you:  · Have symptoms that interfere with your daily activities or sex life.  · Need medicine to help with the discomfort.  · Notice bleeding from your vagina that is not related to your period.  · Have a fever.  · Have pain or bleeding when you urinate.  · Have bleeding when you pass stool.  · Pass urine when you have sex.  · Have chronic constipation.  · Have a pessary that falls out.  · Have bad smelling vaginal discharge.  · Have an unusual, low pain in your abdomen.  Summary  · Pelvic organ prolapse is the stretching, bulging, or dropping of pelvic organs into an abnormal position. It happens when the muscles and tissues that surround and support pelvic structures become weak or stretched.  · When organs other than the vagina are involved, they often bulge into the vagina or protrude from the vagina, depending on how severe the prolapse is.  · In most cases, this condition needs to be treated only if it produces symptoms. Treatment may include lifestyle changes, estrogen, Kegel exercises, pessary insertion, or surgery.  · Avoid heavy lifting and straining with exercise and work. Do not hold your breath when you perform mild to moderate lifting and exercise activities. Limit your activities as directed by your health care provider.  This information is not intended to replace advice given to you by your health care provider. Make sure you discuss any questions you have with your health care provider.  Document Released: 07/15/2015 Document Revised: 01/09/2019 Document Reviewed: 01/09/2019  [employer] Patient Education © 2020 [employer]  Atrophic Vaginitis  Atrophic vaginitis is a condition in which the tissues that line the vagina become dry and  thin. This condition occurs in women who have stopped having their period. It is caused by a drop in a female hormone (estrogen). This hormone helps:  · To keep the vagina moist.  · To make a clear fluid. This clear fluid helps:  ? To make the vagina ready for sex.  ? To protect the vagina from infection.  If the lining of the vagina is dry and thin, it may cause irritation, burning, or itchiness. It may also:  · Make sex painful.  · Make an exam of your vagina painful.  · Cause bleeding.  · Make you lose interest in sex.  · Cause a burning feeling when you pee (urinate).  · Cause a brown or yellow fluid to come from your vagina.  Some women do not have symptoms.  Follow these instructions at home:  Medicines  · Take over-the-counter and prescription medicines only as told by your doctor.  · Do not use herbs or other medicines unless your doctor says it is okay.  · Use medicines for for dryness. These include:  ? Oils to make the vagina soft.  ? Creams.  ? Moisturizers.  General instructions  · Do not douche.  · Do not use products that can make your vagina dry. These include:  ? Scented sprays.  ? Scented tampons.  ? Scented soaps.  · Sex can help increase blood flow and soften the tissue in the vagina. If it hurts to have sex:  ? Tell your partner.  ? Use products to make sex more comfortable. Use these only as told by your doctor.  Contact a doctor if you:  · Have discharge from the vagina that is different than usual.  · Have a bad smell coming from your vagina.  · Have new symptoms.  · Do not get better.  · Get worse.  Summary  · Atrophic vaginitis is a condition in which the lining of the vagina becomes dry and thin.  · This condition affects women who have stopped having their periods.  · Treatment may include using products that help make the vagina soft.  · Call a doctor if do not get better with treatment.  This information is not intended to replace advice given to you by your health care provider. Make  sure you discuss any questions you have with your health care provider.  Document Released: 06/05/2009 Document Revised: 12/31/2018 Document Reviewed: 12/31/2018  [employer] Patient Education © 2020 [employer]  Urinary Tract Infection, Adult  A urinary tract infection (UTI) is an infection of any part of the urinary tract. The urinary tract includes:  · The kidneys.  · The ureters.  · The bladder.  · The urethra.  These organs make, store, and get rid of pee (urine) in the body.  What are the causes?  This is caused by germs (bacteria) in your genital area. These germs grow and cause swelling (inflammation) of your urinary tract.  What increases the risk?  You are more likely to develop this condition if:  · You have a small, thin tube (catheter) to drain pee.  · You cannot control when you pee or poop (incontinence).  · You are female, and:  ? You use these methods to prevent pregnancy:  ? A medicine that kills sperm (spermicide).  ? A device that blocks sperm (diaphragm).  ? You have low levels of a female hormone (estrogen).  ? You are pregnant.  · You have genes that add to your risk.  · You are sexually active.  · You take antibiotic medicines.  · You have trouble peeing because of:  ? A prostate that is bigger than normal, if you are male.  ? A blockage in the part of your body that drains pee from the bladder (urethra).  ? A kidney stone.  ? A nerve condition that affects your bladder (neurogenic bladder).  ? Not getting enough to drink.  ? Not peeing often enough.  · You have other conditions, such as:  ? Diabetes.  ? A weak disease-fighting system (immune system).  ? Sickle cell disease.  ? Gout.  ? Injury of the spine.  What are the signs or symptoms?  Symptoms of this condition include:  · Needing to pee right away (urgently).  · Peeing often.  · Peeing small amounts often.  · Pain or burning when peeing.  · Blood in the pee.  · Pee that smells bad or not like normal.  · Trouble peeing.  · Pee that is  cloudy.  · Fluid coming from the vagina, if you are female.  · Pain in the belly or lower back.  Other symptoms include:  · Throwing up (vomiting).  · No urge to eat.  · Feeling mixed up (confused).  · Being tired and grouchy (irritable).  · A fever.  · Watery poop (diarrhea).  How is this treated?  This condition may be treated with:  · Antibiotic medicine.  · Other medicines.  · Drinking enough water.  Follow these instructions at home:    Medicines  · Take over-the-counter and prescription medicines only as told by your doctor.  · If you were prescribed an antibiotic medicine, take it as told by your doctor. Do not stop taking it even if you start to feel better.  General instructions  · Make sure you:  ? Pee until your bladder is empty.  ? Do not hold pee for a long time.  ? Empty your bladder after sex.  ? Wipe from front to back after pooping if you are a female. Use each tissue one time when you wipe.  · Drink enough fluid to keep your pee pale yellow.  · Keep all follow-up visits as told by your doctor. This is important.  Contact a doctor if:  · You do not get better after 1-2 days.  · Your symptoms go away and then come back.  Get help right away if:  · You have very bad back pain.  · You have very bad pain in your lower belly.  · You have a fever.  · You are sick to your stomach (nauseous).  · You are throwing up.  Summary  · A urinary tract infection (UTI) is an infection of any part of the urinary tract.  · This condition is caused by germs in your genital area.  · There are many risk factors for a UTI. These include having a small, thin tube to drain pee and not being able to control when you pee or poop.  · Treatment includes antibiotic medicines for germs.  · Drink enough fluid to keep your pee pale yellow.  This information is not intended to replace advice given to you by your health care provider. Make sure you discuss any questions you have with your health care provider.  Document Released:  06/05/2009 Document Revised: 12/05/2019 Document Reviewed: 06/27/2019  [employer] Patient Education © 2020 [employer]  Hematuria, Adult  Hematuria is blood in the urine. Blood may be visible in the urine, or it may be identified with a test. This condition can be caused by infections of the bladder, urethra, kidney, or prostate. Other possible causes include:  · Kidney stones.  · Cancer of the urinary tract.  · Too much calcium in the urine.  · Conditions that are passed from parent to child (inherited conditions).  · Exercise that requires a lot of energy.  Infections can usually be treated with medicine, and a kidney stone usually will pass through your urine. If neither of these is the cause of your hematuria, more tests may be needed to identify the cause of your symptoms.  It is very important to tell your health care provider about any blood in your urine, even if it is painless or the blood stops without treatment. Blood in the urine, when it happens and then stops and then happens again, can be a symptom of a very serious condition, including cancer. There is no pain in the initial stages of many urinary cancers.  Follow these instructions at home:  Medicines  · Take over-the-counter and prescription medicines only as told by your health care provider.  · If you were prescribed an antibiotic medicine, take it as told by your health care provider. Do not stop taking the antibiotic even if you start to feel better.  Eating and drinking  · Drink enough fluid to keep your urine clear or pale yellow. It is recommended that you drink 3-4 quarts (2.8-3.8 L) a day. If you have been diagnosed with an infection, it is recommended that you drink cranberry juice in addition to large amounts of water.  · Avoid caffeine, tea, and carbonated beverages. These tend to irritate the bladder.  · Avoid alcohol because it may irritate the prostate (men).  General instructions  · If you have been diagnosed with a kidney stone,  follow your health care provider's instructions about straining your urine to catch the stone.  · Empty your bladder often. Avoid holding urine for long periods of time.  · If you are female:  ? After a bowel movement, wipe from front to back and use each piece of toilet paper only once.  ? Empty your bladder before and after sex.  · Pay attention to any changes in your symptoms. Tell your health care provider about any changes or any new symptoms.  · It is your responsibility to get your test results. Ask your health care provider, or the department performing the test, when your results will be ready.  · Keep all follow-up visits as told by your health care provider. This is important.  Contact a health care provider if:  · You develop back pain.  · You have a fever.  · You have nausea or vomiting.  · Your symptoms do not improve after 3 days.  · Your symptoms get worse.  Get help right away if:  · You develop severe vomiting and are unable take medicine without vomiting.  · You develop severe pain in your back or abdomen even though you are taking medicine.  · You pass a large amount of blood in your urine.  · You pass blood clots in your urine.  · You feel very weak or like you might faint.  · You faint.  Summary  · Hematuria is blood in the urine. It has many possible causes.  · It is very important that you tell your health care provider about any blood in your urine, even if it is painless or the blood stops without treatment.  · Take over-the-counter and prescription medicines only as told by your health care provider.  · Drink enough fluid to keep your urine clear or pale yellow.  This information is not intended to replace advice given to you by your health care provider. Make sure you discuss any questions you have with your health care provider.  Document Released: 12/18/2006 Document Revised: 11/30/2018 Document Reviewed: 01/20/2018  [employer] Patient Education © 2020 [employer]

## 2020-07-02 NOTE — PROGRESS NOTES
Chief Complaint:          Chief Complaint   Patient presents with   • Blood in Urine     Follow op    • Urinary Tract Infection       HPI:   84 y.o. female.  Patient evaluated with Dtr. In clinic again today.     Per Dtr, she has had episodes of gross hematuria x 2 days, with blood trickling down her legs.  Patient reports she started using daily estradiol cream, by placing on her finger and might have accidentally hurt herself putting the cream into her vagina.  She reports dysuria, and burning on urination with this.  Her vaginal exam is negative for any trauma, no episodes of hematuria today, she has a significant uterocele.     She is a recent follow-up for recurrent urinary tract infections, gross hematuria, and atrophic vaginitis. Nevertheless, she reports currently doing better on antibiotic prophylaxis with Macrobid nightly.  She also reports an improvement in her urinary symptoms. She states she still has episodes of frequency, urgency, and nocturia 1-2 times.  She has urine leakage, but this is as a result of her daily Lasix for her CHF.    Denies back pain, pelvic pain and pressure, she denies fever or chills, she does not have nausea, vomiting, or diarrhea.  Patient reports she continues to have episodes of constipation, she is currently out of her lactulose and would like some refills.  When she is unable to get her primary care doctor until next month.    She is currently taking Pyridium, so we are unable to get a urine dipstick today. We will send her urine for culture,      Past Medical History:        Past Medical History:   Diagnosis Date   • Cataract    • Cerebrovascular disease    • Chest pain    • Coronary artery disease     no recent ischemic evaluation   • Diabetes mellitus (CMS/Formerly [facility])     Hemoglobin A1c of 8.3.   • Dyslipidemia     May 2012 - Total 156, triglycerides 297, HDL 47, and LDL 54.   • Dyspnea    • GERD (gastroesophageal reflux disease)    • Hypertension    • Pneumonia    • Swelling  of lower extremity     improved with stockings   • Vitamin D deficiency      The following portions of the patient's history were reviewed and updated as appropriate: allergies, current medications, past family history, past medical history, past social history, past surgical history and problem list.    Current Meds:     Current Outpatient Medications   Medication Sig Dispense Refill   • albuterol (PROVENTIL) (2.5 MG/3ML) 0.083% nebulizer solution Take 2.5 mg by nebulization Every 4 (Four) Hours As Needed.     • albuterol (VENTOLIN HFA) 108 (90 BASE) MCG/ACT inhaler Inhale 1 puff 2 (Two) Times a Day. Before taking Flovent     • amLODIPine (NORVASC) 10 MG tablet Take 1 tablet by mouth Daily. 90 tablet 3   • ascorbic acid (VITAMIN C) 1000 MG tablet Take 1 tablet by mouth Daily.     • aspirin 81 MG tablet Take 81 mg by mouth Daily.     • atorvastatin (LIPITOR) 80 MG tablet Take 1 tablet by mouth Daily. (Patient taking differently: Take 40 mg by mouth Daily.) 30 tablet 3   • carvedilol (COREG) 6.25 MG tablet Take 1 tablet by mouth 2 (Two) Times a Day. 180 tablet 3   • celecoxib (CELEBREX) 200 MG capsule Take 200 mg by mouth Daily.     • Cholecalciferol (VITAMIN D PO) Take 1,000 Units by mouth Daily.     • clonazePAM (KlonoPIN) 0.5 MG tablet Take 0.5 mg by mouth Daily.     • clopidogrel (PLAVIX) 75 MG tablet Take 1 tablet by mouth Daily. 90 tablet 3   • Cranberry 1000 MG capsule Take 1 capsule by mouth Daily.     • Cyanocobalamin (VITAMIN B-12 PO) Take 1 tablet by mouth 1 (One) Time Per Week. Taken on Saturday     • cycloSPORINE (RESTASIS) 0.05 % ophthalmic emulsion Apply 1 drop to eye(s) as directed by provider 2 (Two) Times a Day.     • estradiol (ESTRACE) 0.1 MG/GM vaginal cream Insert 2 g into the vagina Daily. 42.5 g 12   • estropipate (OGEN) 1.5 MG tablet Take 0.5 mg by mouth Daily.     • ferrous sulfate 325 (65 FE) MG tablet Take 325 mg by mouth Daily With Breakfast. 2 daily     • fexofenadine (ALLEGRA) 180 MG  tablet Take 180 mg by mouth 2 (Two) Times a Day.     • fluticasone (FLOVENT HFA) 220 MCG/ACT inhaler Inhale 1 puff 2 (Two) Times a Day.     • furosemide (LASIX) 20 MG tablet Take 1-2 tablets daily as directed 60 tablet 2   • gabapentin (NEURONTIN) 400 MG capsule Take 400 mg by mouth 4 (Four) Times a Day.     • insulin aspart prot-insulin aspart (novoLOG 70/30) (70-30) 100 UNIT/ML injection Inject 26 Units under the skin into the appropriate area as directed Every Morning Before Breakfast.     • insulin aspart prot-insulin aspart (novoLOG 70/30) (70-30) 100 UNIT/ML injection Inject 22 Units under the skin into the appropriate area as directed Daily Before Supper.     • Insulin Glargine (Lantus SoloStar) 100 UNIT/ML injection pen Lantus Solostar U-100 Insulin 100 unit/mL (3 mL) subcutaneous pen     • losartan (COZAAR) 50 MG tablet Take 1 tablet by mouth Daily. (Patient taking differently: Take 100 mg by mouth Daily.) 30 tablet 0   • Magnesium 400 MG tablet Take 1 tablet by mouth Daily. 30 tablet 11   • mometasone (NASONEX) 50 MCG/ACT nasal spray 2 sprays into the nostril(s) as directed by provider Every Evening.     • montelukast (SINGULAIR) 10 MG tablet Take 10 mg by mouth Every Night.     • Morphine (MS CONTIN) 30 MG 12 hr tablet Take 30 mg by mouth 2 (Two) Times a Day.     • nitrofurantoin (MACRODANTIN) 50 MG capsule Take 50 mg by mouth Every Night.     • nitroglycerin (NITROSTAT) 0.4 MG SL tablet Place 1 tablet under the tongue Every 5 (Five) Minutes As Needed for Chest Pain. 25 tablet 3   • O2 (OXYGEN) Inhale 2 L/min At Night As Needed.     • pantoprazole (PROTONIX) 40 MG EC tablet Take 40 mg by mouth 2 (Two) Times a Day.     • phenazopyridine (PYRIDIUM) 100 MG tablet Take 1 tablet by mouth 3 (Three) Times a Day As Needed for Bladder Spasms. 20 tablet 0   • potassium chloride (K-DUR) 10 MEQ CR tablet Take 1 tablet by mouth 2 (Two) Times a Day. 180 tablet 3   • Sennosides (SENNO PO) Take  by mouth As Needed.     •  tiZANidine (ZANAFLEX) 4 MG tablet Take 4 mg by mouth Every Night. 2 tablets at bedtime     • tolterodine LA (DETROL LA) 4 MG 24 hr capsule Take 1 capsule by mouth daily.     • lactulose (CHRONULAC) 10 GM/15ML solution Take 15 mL by mouth Every Night. 1892 mL 6     No current facility-administered medications for this visit.         Allergies:      Allergies   Allergen Reactions   • Isosorbide Hives   • Augmentin [Amoxicillin-Pot Clavulanate] Diarrhea and Rash        Past Surgical History:     Past Surgical History:   Procedure Laterality Date   • BLADDER REPAIR     • BREAST BIOPSY     • BREAST CYST ASPIRATION     • CARDIAC CATHETERIZATION     • CARDIAC CATHETERIZATION Left 11/10/2018    Procedure: Cardiac Catheterization/Vascular Study;  Surgeon: Bethel Mercedes MD;  Location:  [facility] C[facility] INVASIVE [facility]ATION;  Service: Cardiology   • CARPAL TUNNEL RELEASE Bilateral    • CATARACT EXTRACTION     • CATARACT EXTRACTION  2018    right eye    • CHOLECYSTECTOMY     • CORONARY STENT PLACEMENT     • HYSTERECTOMY     • PA RT/LT HEART CATHETERS N/A 11/28/2018    Procedure: PERCUTANEOUS CORONARY INTERVENTION;  Surgeon: Bethel Mercedes MD;  Location:  [facility] CATH INVASIVE LOCATION;  Service: Cardiology   • SPINAL FUSION           Social History:     Social History     Socioeconomic History   • Marital status:      Spouse name: Not on file   • Number of children: Not on file   • Years of education: Not on file   • Highest education level: Not on file   Occupational History   • Occupation: retired   Tobacco Use   • Smoking status: Never Smoker   • Smokeless tobacco: Never Used   Substance and Sexual Activity   • Alcohol use: No   • Drug use: No   • Sexual activity: Defer   Social History Narrative    Caffeine: 3 servings per day    Patient lives with her jonahher       Family History:     Family History   Problem Relation Age of Onset   • Heart disease Mother    • Hypertension Mother    • Heart disease Father    • Hypertension  "Father    • Heart attack Father    • Diabetes Father    • Diabetes Brother    • Liver cancer Brother    • Hypertension Brother    • Stroke Maternal Grandmother    • Heart attack Maternal Grandmother    • Stroke Maternal Grandfather    • No Known Problems Paternal Grandmother    • No Known Problems Paternal Grandfather        Review of Systems:     Review of Systems     Physical Exam:     Physical Exam   Constitutional: She is oriented to person, place, and time. She appears well-developed and well-nourished. No distress.   HENT:   Head: Normocephalic and atraumatic.   Eyes: Pupils are equal, round, and reactive to light. EOM are normal.   Neck: Normal range of motion. No tracheal deviation present. No thyromegaly present.   Cardiovascular: Normal rate and regular rhythm.   No murmur heard.  Pulmonary/Chest: Breath sounds normal. No stridor. She is in respiratory distress ( Exertional dyspnea, wheezing, S OB). She has no wheezes.   Abdominal: Soft. Bowel sounds are normal. There is tenderness.   Genitourinary: Vagina normal. There is no tenderness on the right labia. There is no tenderness on the left labia. No vaginal discharge found.   Musculoskeletal: Normal range of motion. She exhibits edema (BLE). She exhibits no deformity.   Neurological: She is alert and oriented to person, place, and time. No cranial nerve deficit or sensory deficit. Coordination normal.   Skin: Skin is warm and dry. Capillary refill takes 2 to 3 seconds. No rash noted. No erythema. No pallor.   Psychiatric: She has a normal mood and affect. Her behavior is normal. Judgment and thought content normal.         Procedure:       Assessment/Plan:     Recurrent UTI's/Gross Hematuria/ Atrophic Vaginitis / Cystocele: Patient returns to clinic today for follow-up.  Reports 2 episodes of gross hematuria that happened in the last week and was very bothersome to her.  Per her daughter, \"she had blood trickling down her legs\".  She is in no apparent " distress today, she reports her episodes of gross hematuria has resolved.  Her last CT scan was completely negative for any causes of her hematuria.    Patient reports possible trauma when she was putting her estradiol cream, however her pelvic exam today is completely negative for any trauma, she still has a significant ureterocele.  She still reports episodes of frequency, urgency, urine leakage, and burning on urination.  Urine culture was negative for any growth, she is currently on antibiotic suppressive therapy.    We discussed she continue her antibiotic prophylaxis with nitrofurantoin as discussed.    Stop the estradiol vaginal cream, continue  p.o. estradiol as prescribed by her primary care.    Refilled her lactulose as requested, she is to follow-up with her primary care.    Continue PRN Pyridium as prescribed for dysuria/burning on urination.      Patient reports that she is not currently experiencing any symptoms of urinary incontinence.    We discussed the need to repeat a CT abdomen pelvis renal mass protocol.  (Last CT in February was negative)    Patient has been scheduled  for Cystoscopy with Dr. Cano on 07/30/2020    Patient and daughter agreeable to plan of care.      Patient's Body mass index is 24 kg/m². BMI is above normal parameters. Recommendations include: educational material, exercise counseling and nutrition counseling.    Smoking Cessation Counseling:  Never a smoker.  Patient does not currently use any tobacco products.      Counseling was given to patient, family and caretaker for the following topics diagnostic results including: Gross hematuria , Recurrent UTIs, and patient and family education including: Avoiding bladder irritants such as caffeine products, citrusy foods, or any trauma.  Cystoscopy with Dr. Lo. The interim medical history and current results were reviewed.  A treatment plan with follow-up was made for, atrophic vaginitis, Hematuria, unspecified type  [R31.9].          This document has been electronically signed by Griselda Cheng-Akwa, APRN July 6, 2020 17:10

## 2020-07-06 NOTE — TELEPHONE ENCOUNTER
CALLED PATIENT TO CHECK ON HOW SHE WAS DOING SINCE LAST VISIT, AND TO DISCUSS HER URINE CULTURE RESULTS WERE NEGATIVE.  07/02/2020    Specimen Information: Urine, Clean Catch        Component    Urine Culture Final report    Result 1 No growth            SHE MAY CALL WITH ANY QUESTIONS SHE MAY HAVE.    TRANG DONIS

## 2020-07-20 NOTE — PROGRESS NOTES
[facility]  Office Progress Note  Fani Bird  1935      Visit Date: 07/20/20    PCP: Emerson Alvarez MD  [address] [place] [id number]    IDENTIFICATION: A 84 y.o. female  from [place].      PROBLEM LIST:  1. CAD:  a. On 01/10/2014: NSTEMI with 3.0 x 15 Integrity BMS to ostium of circumflex per MGR. Normal LVEF.  b. September 2015, admission to [facility] with ACS with negative enzymes and negative EKG.   c. 11/10/18 severe distal left main, ostial LAD, ostial circumflex disease.  CT S consult obtained, patient too high risk for CABG  d. 11/10/18 echo: EF 60%, basal inferior lateral and mid inferior lateral hypokinetic wall segments, grade 1 diastolic dysfunction  e. 11/28/18 [facility] high-risk PCI of the left main, LAD  3.5 Tryton bifurcation stent, CX balloon dilated no stent - [facility]aren  2. Cerebrovascular disease:  a. TIA/CVA, September 2013.  b. 10/17 CUS nonobstructive  c. 11/18 C US no change  3. Lower extremity swelling and dyspnea:  a. Improved with iron supplementation and stockings.   4. Lower extremity rash following implantation of sulfa antibiotic.  5. Hypertension.  6. Dyslipidemia:  a. May 2012 - Total 156, triglycerides 297, HDL 47, and LDL 54.  7. GERD.  8. Vitamin D deficiency.   9. Hospitalization, September 2013, with pneumonia, rhabdomyolysis, and CVA.  10. Diabetes mellitus.  a. Hemoglobin A1c of 8.3.  11. Prior surgeries:  a. Hysterectomy.  b. Carpal tunnel release bilaterally.  c. Gallbladder resection.  d. Back surgery  e. Bladder tack.      Chief Complaint   Patient presents with   • Coronary Artery Disease       Allergies  Allergies   Allergen Reactions   • Isosorbide Hives   • Augmentin [Amoxicillin-Pot Clavulanate] Diarrhea and Rash       Current Medications    Current Outpatient Medications:   •  albuterol (PROVENTIL) (2.5 MG/3ML) 0.083% nebulizer solution, Take 2.5 mg by nebulization Every 4 (Four) Hours As Needed., Disp: , Rfl:    •  albuterol (VENTOLIN HFA) 108 (90 BASE) MCG/ACT inhaler, Inhale 1 puff 2 (Two) Times a Day. Before taking Flovent, Disp: , Rfl:   •  amLODIPine (NORVASC) 10 MG tablet, Take 1 tablet by mouth Daily., Disp: 90 tablet, Rfl: 3  •  aspirin 81 MG tablet, Take 81 mg by mouth Daily., Disp: , Rfl:   •  celecoxib (CELEBREX) 200 MG capsule, Take 100 mg by mouth Daily., Disp: , Rfl:   •  clonazePAM (KlonoPIN) 0.5 MG tablet, Take 0.5 mg by mouth Daily., Disp: , Rfl:   •  Cranberry 1000 MG capsule, Take 1 capsule by mouth Daily., Disp: , Rfl:   •  Cyanocobalamin (VITAMIN B-12 PO), Take 1 tablet by mouth 1 (One) Time Per Week. Taken on Saturday, Disp: , Rfl:   •  cycloSPORINE (RESTASIS) 0.05 % ophthalmic emulsion, Apply 1 drop to eye(s) as directed by provider 2 (Two) Times a Day., Disp: , Rfl:   •  estropipate (OGEN) 1.5 MG tablet, Take 0.5 mg by mouth Daily., Disp: , Rfl:   •  ferrous sulfate 325 (65 FE) MG tablet, Take 325 mg by mouth Daily With Breakfast. 2 daily, Disp: , Rfl:   •  fexofenadine (ALLEGRA) 180 MG tablet, Take 180 mg by mouth 2 (Two) Times a Day., Disp: , Rfl:   •  fluticasone (FLOVENT HFA) 220 MCG/ACT inhaler, Inhale 1 puff 2 (Two) Times a Day., Disp: , Rfl:   •  furosemide (LASIX) 20 MG tablet, Take 1-2 tablets daily as directed (Patient taking differently: Take 20 mg by mouth Daily. Take 1-2 tablets daily as directed), Disp: 60 tablet, Rfl: 2  •  gabapentin (NEURONTIN) 400 MG capsule, Take 400 mg by mouth 4 (Four) Times a Day., Disp: , Rfl:   •  Insulin Glargine (Lantus SoloStar) 100 UNIT/ML injection pen, Daily. Sliding scale, Disp: , Rfl:   •  lactulose (CHRONULAC) 10 GM/15ML solution, Take 15 mL by mouth Every Night., Disp: 1892 mL, Rfl: 6  •  Magnesium 400 MG tablet, Take 1 tablet by mouth Daily., Disp: 30 tablet, Rfl: 11  •  mometasone (NASONEX) 50 MCG/ACT nasal spray, 2 sprays into the nostril(s) as directed by provider Every Evening., Disp: , Rfl:   •  montelukast (SINGULAIR) 10 MG tablet, Take 10  "mg by mouth Every Night., Disp: , Rfl:   •  Morphine (MS CONTIN) 30 MG 12 hr tablet, Take 30 mg by mouth 2 (Two) Times a Day., Disp: , Rfl:   •  nitrofurantoin (MACRODANTIN) 50 MG capsule, Take 50 mg by mouth Every Night., Disp: , Rfl:   •  nitroglycerin (NITROSTAT) 0.4 MG SL tablet, Place 1 tablet under the tongue Every 5 (Five) Minutes As Needed for Chest Pain., Disp: 25 tablet, Rfl: 3  •  O2 (OXYGEN), Inhale 2 L/min At Night As Needed., Disp: , Rfl:   •  pantoprazole (PROTONIX) 40 MG EC tablet, Take 40 mg by mouth 2 (Two) Times a Day., Disp: , Rfl:   •  potassium chloride (K-DUR) 10 MEQ CR tablet, Take 1 tablet by mouth 2 (Two) Times a Day., Disp: 180 tablet, Rfl: 3  •  Sennosides (SENNO PO), Take  by mouth As Needed., Disp: , Rfl:   •  tiZANidine (ZANAFLEX) 4 MG tablet, Take 4 mg by mouth Every Night. 2 tablets at bedtime, Disp: , Rfl:   •  tolterodine LA (DETROL LA) 4 MG 24 hr capsule, Take 1 capsule by mouth daily., Disp: , Rfl:   •  atorvastatin (LIPITOR) 40 MG tablet, Take 1 tablet by mouth Daily., Disp: 90 tablet, Rfl: 3  •  carvedilol (COREG) 6.25 MG tablet, Take 1 tablet by mouth 2 (Two) Times a Day., Disp: 180 tablet, Rfl: 3  •  clopidogrel (PLAVIX) 75 MG tablet, Take 1 tablet by mouth Daily., Disp: 90 tablet, Rfl: 3  •  losartan (COZAAR) 100 MG tablet, Take 1 tablet by mouth Daily., Disp: 90 tablet, Rfl: 3      History of Present Illness   Fani Bird is a 84 y.o. year old female  For telehealth follow up.    She is largely been on homebound due to COVID pandemic.  She states she is only taken 1 nitroglycerin since December.  Her daughters attend her needs            OBJECTIVE:  Vitals:    07/20/20 1508   Weight: 56.2 kg (124 lb)   Height: 157.5 cm (62\")     Physical Exam   Constitutional: She appears well-developed and well-nourished.   Neck: Normal range of motion. Neck supple. No hepatojugular reflux and no JVD present. Carotid bruit is not present. No tracheal deviation present. No " thyromegaly present.   Cardiovascular: Normal rate, regular rhythm, S1 normal, S2 normal, intact distal pulses and normal pulses. PMI is not displaced. Exam reveals no gallop, no distant heart sounds, no friction rub, no midsystolic click and no opening snap.   Murmur heard.  Pulses:       Radial pulses are 2+ on the right side, and 2+ on the left side.        Dorsalis pedis pulses are 2+ on the right side, and 2+ on the left side.        Posterior tibial pulses are 2+ on the right side, and 2+ on the left side.   Pulmonary/Chest: Effort normal and breath sounds normal. She has no wheezes. She has no rales.   Abdominal: Soft. Bowel sounds are normal. She exhibits no mass. There is no tenderness. There is no guarding.       Diagnostic Data:  Procedures      ASSESSMENT:   Diagnosis Plan   1. Coronary artery disease involving native coronary artery of native heart without angina pectoris     2. Essential hypertension     3. Mixed hyperlipidemia         PLAN:  CAD post high risk PCI last year.  Asymptomatic  continue current rx.    chf chronic diastolic.  Self titrates loop diuretic     htn controlled on coreg /losartan    Hl on statin    Emerson Alvarez MD, thank you for referring Ms. Bird for evaluation.  I have forwarded my electronically generated recommendations to you for review.  Please do not hesitate to call with any questions.    This patient has consented to a telehealth visit via phone. The visit was scheduled as a health visit to comply with patient safety concerns in accordance with CDC recommendations.  All vitals recorded within this visit are reported by the patient.  I spent  15 minutes in total including but not limited to the 11 minutes spent in direct conversation with this patient.     Chinmay Whitley MD, [facility]

## 2020-08-04 NOTE — PROGRESS NOTES
Fani Bird  1935  [id number]      Chief Complaint   Patient presents with   • Back Pain       HISTORY OF PRESENT ILLNESS: This is an 84-year-old female who is been under my care in the past for treatment of advanced degenerative osteoarthritis, spondylolisthesis and segmental instability.  She has had pedicle screw fixation in the past which has helped considerably and so far as her claudication.  Nevertheless she has had progressive degenerative osteoarthritis with changes both in her upper and lower extremities.  Her hands have become markedly deformed and restricted inability.  She has pain in the cervical thoracic and lumbar area.  She has pain which radiates into her hips and into her lower extremities.    She has been with pain management for the past 8 years on a regiment of MS Contin 30 mg twice daily and 15 mg of MS Contin every 12 hours for breakthrough pain.  Her pain physician has apparently retired and she has been referred to Dr. Beal.  He is reduced her MS Contin to 30 mg twice daily and she is having issues with breakthrough pain.  The pain limits her abilities for ambulation which she does with a walker.    CT scans have been performed and she is referred for a neurosurgical consultation is to whether there are any other options available to her.    Past Medical History:   Diagnosis Date   • Cataract    • Cerebrovascular disease    • Chest pain    • Coronary artery disease     no recent ischemic evaluation   • Diabetes mellitus (CMS/[facility])     Hemoglobin A1c of 8.3.   • Dyslipidemia     May 2012 - Total 156, triglycerides 297, HDL 47, and LDL 54.   • Dyspnea    • GERD (gastroesophageal reflux disease)    • Hypertension    • Pneumonia    • Swelling of lower extremity     improved with stockings   • Vitamin D deficiency        Past Surgical History:   Procedure Laterality Date   • BLADDER REPAIR     • BREAST BIOPSY     • BREAST CYST ASPIRATION     • CARDIAC CATHETERIZATION     •  CARDIAC CATHETERIZATION Left 11/10/2018    Procedure: Cardiac Catheterization/Vascular Study;  Surgeon: Bethel Mercedes MD;  Location:  [facility];  Service: Cardiology   • CARPAL TUNNEL RELEASE Bilateral    • CATARACT EXTRACTION     • CATARACT EXTRACTION  2018    right eye    • CHOLECYSTECTOMY     • CORONARY STENT PLACEMENT     • HYSTERECTOMY     • [place] RT/LT HEART CATHETERS N/A 11/28/2018    Procedure: PERCUTANEOUS CORONARY INTERVENTION;  Surgeon: Bethel Mercedes MD;  Location:  [facility];  Service: Cardiology   • SPINAL FUSION         Family History   Problem Relation Age of Onset   • Heart disease Mother    • Hypertension Mother    • Heart disease Father    • Hypertension Father    • Heart attack Father    • Diabetes Father    • Diabetes Brother    • Liver cancer Brother    • Hypertension Brother    • Stroke Maternal Grandmother    • Heart attack Maternal Grandmother    • Stroke Maternal Grandfather    • No Known Problems Paternal Grandmother    • No Known Problems Paternal Grandfather        Social History     Socioeconomic History   • Marital status:      Spouse name: Not on file   • Number of children: Not on file   • Years of education: Not on file   • Highest education level: Not on file   Occupational History   • Occupation: retired   Tobacco Use   • Smoking status: Never Smoker   • Smokeless tobacco: Never Used   Substance and Sexual Activity   • Alcohol use: No   • Drug use: No   • Sexual activity: Defer   Social History Narrative    Caffeine: 3 servings per day    Patient lives with her daughther       Allergies   Allergen Reactions   • Isosorbide Hives   • Augmentin [Amoxicillin-Pot Clavulanate] Diarrhea and Rash         Current Outpatient Medications:   •  albuterol (PROVENTIL) (2.5 MG/3ML) 0.083% nebulizer solution, Take 2.5 mg by nebulization Every 4 (Four) Hours As Needed., Disp: , Rfl:   •  albuterol (VENTOLIN HFA) 108 (90 BASE) MCG/ACT inhaler, Inhale 1  puff 2 (Two) Times a Day. Before taking Flovent, Disp: , Rfl:   •  amLODIPine (NORVASC) 10 MG tablet, Take 1 tablet by mouth Daily., Disp: 90 tablet, Rfl: 3  •  aspirin 81 MG tablet, Take 81 mg by mouth Daily., Disp: , Rfl:   •  atorvastatin (LIPITOR) 40 MG tablet, Take 1 tablet by mouth Daily., Disp: 90 tablet, Rfl: 3  •  carvedilol (COREG) 6.25 MG tablet, Take 1 tablet by mouth 2 (Two) Times a Day., Disp: 180 tablet, Rfl: 3  •  celecoxib (CELEBREX) 200 MG capsule, Take 100 mg by mouth Daily., Disp: , Rfl:   •  clonazePAM (KlonoPIN) 0.5 MG tablet, Take 0.5 mg by mouth Daily., Disp: , Rfl:   •  clopidogrel (PLAVIX) 75 MG tablet, Take 1 tablet by mouth Daily., Disp: 90 tablet, Rfl: 3  •  Cranberry 1000 MG capsule, Take 1 capsule by mouth Daily., Disp: , Rfl:   •  Cyanocobalamin (VITAMIN B-12 PO), Take 1 tablet by mouth 1 (One) Time Per Week. Taken on Saturday, Disp: , Rfl:   •  cycloSPORINE (RESTASIS) 0.05 % ophthalmic emulsion, Apply 1 drop to eye(s) as directed by provider 2 (Two) Times a Day., Disp: , Rfl:   •  estropipate (OGEN) 1.5 MG tablet, Take 0.5 mg by mouth Daily., Disp: , Rfl:   •  ferrous sulfate 325 (65 FE) MG tablet, Take 325 mg by mouth Daily With Breakfast. 2 daily, Disp: , Rfl:   •  fexofenadine (ALLEGRA) 180 MG tablet, Take 180 mg by mouth 2 (Two) Times a Day., Disp: , Rfl:   •  fluticasone (FLOVENT HFA) 220 MCG/ACT inhaler, Inhale 1 puff 2 (Two) Times a Day., Disp: , Rfl:   •  furosemide (LASIX) 20 MG tablet, Take 1-2 tablets daily as directed (Patient taking differently: Take 20 mg by mouth Daily. Take 1-2 tablets daily as directed), Disp: 60 tablet, Rfl: 2  •  gabapentin (NEURONTIN) 400 MG capsule, Take 400 mg by mouth 4 (Four) Times a Day., Disp: , Rfl:   •  Insulin Glargine (Lantus SoloStar) 100 UNIT/ML injection pen, Daily. Sliding scale, Disp: , Rfl:   •  lactulose (CHRONULAC) 10 GM/15ML solution, Take 15 mL by mouth Every Night., Disp: 1892 mL, Rfl: 6  •  losartan (COZAAR) 100 MG tablet,  Take 1 tablet by mouth Daily., Disp: 90 tablet, Rfl: 3  •  Magnesium 400 MG tablet, Take 1 tablet by mouth Daily., Disp: 30 tablet, Rfl: 11  •  mometasone (NASONEX) 50 MCG/ACT nasal spray, 2 sprays into the nostril(s) as directed by provider Every Evening., Disp: , Rfl:   •  montelukast (SINGULAIR) 10 MG tablet, Take 10 mg by mouth Every Night., Disp: , Rfl:   •  Morphine (MS CONTIN) 30 MG 12 hr tablet, Take 30 mg by mouth 2 (Two) Times a Day., Disp: , Rfl:   •  nitrofurantoin (MACRODANTIN) 50 MG capsule, Take 50 mg by mouth Every Night., Disp: , Rfl:   •  nitroglycerin (NITROSTAT) 0.4 MG SL tablet, Place 1 tablet under the tongue Every 5 (Five) Minutes As Needed for Chest Pain., Disp: 25 tablet, Rfl: 3  •  O2 (OXYGEN), Inhale 2 L/min At Night As Needed., Disp: , Rfl:   •  pantoprazole (PROTONIX) 40 MG EC tablet, Take 40 mg by mouth 2 (Two) Times a Day., Disp: , Rfl:   •  potassium chloride (K-DUR) 10 MEQ CR tablet, Take 1 tablet by mouth 2 (Two) Times a Day., Disp: 180 tablet, Rfl: 3  •  Sennosides (SENNO PO), Take  by mouth As Needed., Disp: , Rfl:   •  tiZANidine (ZANAFLEX) 4 MG tablet, Take 4 mg by mouth Every Night. 2 tablets at bedtime, Disp: , Rfl:   •  tolterodine LA (DETROL LA) 4 MG 24 hr capsule, Take 1 capsule by mouth daily., Disp: , Rfl:     Review of Systems   Constitutional: Negative for activity change, appetite change, chills, diaphoresis, fatigue, fever and unexpected weight change.   HENT: Positive for congestion and postnasal drip. Negative for dental problem, drooling, ear discharge, ear pain, facial swelling, hearing loss, mouth sores, nosebleeds, rhinorrhea, sinus pressure, sneezing, sore throat, tinnitus, trouble swallowing and voice change.    Eyes: Negative for photophobia, pain, discharge, redness, itching and visual disturbance.   Respiratory: Positive for shortness of breath and wheezing. Negative for apnea, cough, choking, chest tightness and stridor.    Cardiovascular: Negative for  "chest pain, palpitations and leg swelling.   Gastrointestinal: Negative for abdominal distention, abdominal pain, anal bleeding, blood in stool, constipation, diarrhea, nausea, rectal pain and vomiting.   Endocrine: Negative for cold intolerance, heat intolerance, polydipsia, polyphagia and polyuria.   Genitourinary: Negative for decreased urine volume, difficulty urinating, dysuria, enuresis, flank pain, frequency, genital sores, hematuria and urgency.   Musculoskeletal: Positive for arthralgias, back pain, gait problem, joint swelling, myalgias, neck pain and neck stiffness.   Skin: Negative for color change, pallor, rash and wound.   Allergic/Immunologic: Positive for environmental allergies. Negative for food allergies and immunocompromised state.   Neurological: Negative for dizziness, tremors, seizures, syncope, facial asymmetry, speech difficulty, weakness, light-headedness, numbness and headaches.   Hematological: Negative for adenopathy. Does not bruise/bleed easily.   Psychiatric/Behavioral: Positive for sleep disturbance. Negative for agitation, behavioral problems, confusion, decreased concentration, dysphoric mood, hallucinations, self-injury and suicidal ideas. The patient is nervous/anxious. The patient is not hyperactive.    All other systems reviewed and are negative.      Vitals:    08/04/20 1123   BP: 136/68   BP Location: Right arm   Patient Position: Sitting   Cuff Size: Adult   Temp: 98.2 °F (36.8 °C)   TempSrc: Infrared   Weight: 56.2 kg (124 lb)   Height: 157.5 cm (62\")       Neurological Examination:  Mental status/speech: The patient is alert and oriented.  Speech is clear without aphysia or dysarthria.  No overt cognitive deficits.    Cranial nerve examination:    Olfaction: Smell is intact.  Vision: Vision is intact without visual field abnormalities.  Funduscopic examination is normal.  No pupillary irregularity.  Ocular motor examination: The extraocular muscles are intact.  There is no " diplopia.  The pupil is round and reactive to both light and accommodation.  There is no nystagmus.  Facial movement/sensation: There is no facial weakness.  Sensation is intact in the first, second, and third divisions of the trigeminal nerve.  The corneal reflex is intact.  Auditory: Hearing is intact to finger rub bilaterally.  Cranial nerves IX, X, XI, XII: Phonation is normal.  No dysphagia.  Tongue is protruded in the midline without atrophy.  The gag reflex is intact.  Shoulder shrug is normal.    Musculoligamentous ligamentous examination: She has marked deformity of her upper extremities with limited use and generalized weakness.  She is hyporeflexic in her upper extremities.  She has diminished range of motion of her shoulders neck wrists and hands.  She has decreased range of motion lumbar spine.  Straight leg raising is negative.  She is areflexic.  She has generalized weakness in both lower extremities as well.  No Babinski or Feliciano, however    Medical Decision Making:     Diagnostic Data Set: Review of the diagnostic data set shows that she has advanced severe and diffuse degenerative osteoarthritis.  This is located in the cervical region where she has anterolisthesis C2-3 and see 6-7.  She has critical central spinal stenosis at the latter with multiple areas of neuroforaminal stenosis.  The thoracic shows degenerative changes throughout.  No's stenosis within the thoracic spinal canal.  The lumbar MRI shows a previous pedicle screw fixation L5-S1.  There is a grade 2-3 spondylolisthesis at L4-5 and L3-4 with high-grade spinal stenosis at each of these levels.      Assessment: Severe, diffuse and advanced degenerative osteoarthritis of the spine          Recommendations: She has a significant anatomical abnormality throughout her lumbar spine.  Unfortunately her cardiac status and pulmonary status precludes general anesthesia that would be required for decompression and stabilization.  She is an  unacceptable risk for surgery therefore decompression and fusion is not a remedy.  She seems to have done reasonably well on the medicine regiment that she had for the past 8 years.  Therefore, given all factors it would seem reasonable to resume those.  I have given her a prescription of 15 mg of MS Contin to take every 12 hours on a as needed basis in addition to her routine 30 mg of MS Contin twice daily.    I will communicate with Dr. Beal in hopes that he will continue with this regiment.  I think there are no other alternatives for her unfortunately.        I greatly appreciate the opportunity to see and evaluate this individual.  If you have questions or concerns regarding issues that I may have overlooked please call me at any time: [phone].  Durga Bauman M.D.  [facility]  [address] [place] [address].  [facility]  [id number]

## 2020-08-05 NOTE — TELEPHONE ENCOUNTER
S/W Dr. Bauman who agrees to keep rx as they are currently. Attempted to call pharmacy, but no answer.

## 2020-08-05 NOTE — TELEPHONE ENCOUNTER
Pharmacy called wanting clarification on MS Contin rx.  I have asked Dr. Bauman if he wanted rx to be IR or ER.  Awaiting his response.

## 2020-08-05 NOTE — TELEPHONE ENCOUNTER
S/W pharmacist.  She states she looked at RX wrong.  Dr. Beal prescribes MS Contin 30 IR and Dr. Bauman gave pt MS Contin 15 ER.  Does Dr. Bauman want pt to continue with 15 ER?  The patient is safe to take these as they are prescribed right now.

## 2020-09-04 NOTE — PROGRESS NOTES
Subjective   Chief Complaint   Patient presents with   • Urinary Incontinence     Always leaking urine     Fani Bird is a 84 y.o. year old  presenting to be seen for urinary incontinence and prolapse.    Her daughter assists her with the history today.  Previous hysterectomy in .  Prolapse repair in .  He continues to leak urine which is worsened during this year.  She voids numerous times that the day and does have nocturia typically 2-3 times each night.  She leaks urine with urgency.  She leaks urine with sneezing.  She is already taking Detrol.  She is not sexually active.  She has significant medical comorbidities and is unable to undergo general anesthesia.    OB Hx: 1 vaginal birth  Pap smear: denies dysplasia  Mammogram: 2018 - normal  Colonoscopy:  - normal  DEXA Scan: never    She denies nausea, emesis, fevers, chills, significant weight changes, hair/skin/nail changes, dysuria, urinary frequency, palpitations, chest pain, headaches, myalgia, dyspnea.     History  Past Medical History:   Diagnosis Date   • Abnormal Pap smear of cervix    • Anxiety    • Cataract    • Cerebrovascular disease    • Chest pain    • CHF (congestive heart failure) (CMS/[facility])    • Coronary artery disease     no recent ischemic evaluation   • Diabetes mellitus (CMS/[facility])     Hemoglobin A1c of 8.3.   • Dyslipidemia     May 2012 - Total 156, triglycerides 297, HDL 47, and LDL 54.   • Dyspnea    • GERD (gastroesophageal reflux disease)    • Gestational diabetes    • Gestational hypertension    • Hypertension    • Pneumonia    • Swelling of lower extremity     improved with stockings   • Vitamin D deficiency    , Past Surgical History:   Procedure Laterality Date   • BLADDER REPAIR     • BREAST BIOPSY     • BREAST CYST ASPIRATION     • CARDIAC CATHETERIZATION     • CARDIAC CATHETERIZATION Left 11/10/2018    Procedure: Cardiac Catheterization/Vascular Study;  Surgeon: Bethel Mercedes MD;  Location: [facility]  INVASIVE LOCATION;  Service: Cardiology   • CARPAL TUNNEL RELEASE Bilateral    • CATARACT EXTRACTION     • CATARACT EXTRACTION  2018    right eye    • CHOLECYSTECTOMY     • CORONARY STENT PLACEMENT     • HYSTERECTOMY     • [place] RT/LT HEART CATHETERS N/A 11/28/2018    Procedure: PERCUTANEOUS CORONARY INTERVENTION;  Surgeon: Bethel Mercedes MD;  Location: [facility] INVASIVE LOCATION;  Service: Cardiology   • SPINAL FUSION     , Family History   Problem Relation Age of Onset   • Heart disease Mother    • Hypertension Mother    • Heart disease Father    • Hypertension Father    • Heart attack Father    • Diabetes Father    • Diabetes Brother    • Liver cancer Brother    • Hypertension Brother    • Stroke Maternal Grandmother    • Heart attack Maternal Grandmother    • Stroke Maternal Grandfather    • No Known Problems Paternal Grandmother    • No Known Problems Paternal Grandfather    , Social History     Tobacco Use   • Smoking status: Never Smoker   • Smokeless tobacco: Never Used   Substance Use Topics   • Alcohol use: No   • Drug use: No   ,   (Not in a hospital admission) and Allergies:  Isosorbide and Augmentin [amoxicillin-pot clavulanate]    Current Outpatient Medications on File Prior to Visit   Medication Sig Dispense Refill   • albuterol (PROVENTIL) (2.5 MG/3ML) 0.083% nebulizer solution Take 2.5 mg by nebulization Every 4 (Four) Hours As Needed.     • albuterol (VENTOLIN HFA) 108 (90 BASE) MCG/ACT inhaler Inhale 1 puff 2 (Two) Times a Day. Before taking Flovent     • amLODIPine (NORVASC) 10 MG tablet Take 1 tablet by mouth Daily. 90 tablet 3   • aspirin 81 MG tablet Take 81 mg by mouth Daily.     • atorvastatin (LIPITOR) 40 MG tablet Take 1 tablet by mouth Daily. 90 tablet 3   • carvedilol (COREG) 6.25 MG tablet Take 1 tablet by mouth 2 (Two) Times a Day. 180 tablet 3   • celecoxib (CELEBREX) 200 MG capsule Take 100 mg by mouth Daily.     • clonazePAM (KlonoPIN) 0.5 MG tablet Take 0.5 mg by mouth Daily.     •  clopidogrel (PLAVIX) 75 MG tablet Take 1 tablet by mouth Daily. 90 tablet 3   • Cranberry 1000 MG capsule Take 1 capsule by mouth Daily.     • Cyanocobalamin (VITAMIN B-12 PO) Take 1 tablet by mouth 1 (One) Time Per Week. Taken on Saturday     • cycloSPORINE (RESTASIS) 0.05 % ophthalmic emulsion Apply 1 drop to eye(s) as directed by provider 2 (Two) Times a Day.     • estropipate (OGEN) 1.5 MG tablet Take 0.5 mg by mouth Daily.     • ferrous sulfate 325 (65 FE) MG tablet Take 325 mg by mouth Daily With Breakfast. 2 daily     • fexofenadine (ALLEGRA) 180 MG tablet Take 180 mg by mouth 2 (Two) Times a Day.     • fluticasone (FLOVENT HFA) 220 MCG/ACT inhaler Inhale 1 puff 2 (Two) Times a Day.     • furosemide (LASIX) 20 MG tablet Take 1 tablet by mouth Daily. Take 1-2 tablets daily as directed 180 tablet 1   • gabapentin (NEURONTIN) 400 MG capsule Take 400 mg by mouth 4 (Four) Times a Day.     • Insulin Glargine (Lantus SoloStar) 100 UNIT/ML injection pen Daily. Sliding scale     • lactulose (CHRONULAC) 10 GM/15ML solution Take 15 mL by mouth Every Night. 1892 mL 6   • losartan (COZAAR) 100 MG tablet Take 1 tablet by mouth Daily. 90 tablet 3   • Magnesium 400 MG tablet Take 1 tablet by mouth Daily. 30 tablet 11   • mometasone (NASONEX) 50 MCG/ACT nasal spray 2 sprays into the nostril(s) as directed by provider Every Evening.     • montelukast (SINGULAIR) 10 MG tablet Take 10 mg by mouth Every Night.     • Morphine (MS CONTIN) 15 MG 12 hr tablet Take 1 tablet by mouth 2 (Two) Times a Day As Needed for Severe Pain . 60 tablet 0   • Morphine (MS CONTIN) 30 MG 12 hr tablet Take 30 mg by mouth 2 (Two) Times a Day.     • nitrofurantoin (MACRODANTIN) 50 MG capsule Take 50 mg by mouth Every Night.     • nitroglycerin (Nitrostat) 0.4 MG SL tablet Place 1 tablet under the tongue Every 5 (Five) Minutes As Needed for Chest Pain. 25 tablet 3   • O2 (OXYGEN) Inhale 2 L/min At Night As Needed.     • pantoprazole (PROTONIX) 40 MG EC  "tablet Take 40 mg by mouth 2 (Two) Times a Day.     • potassium chloride (K-DUR) 10 MEQ CR tablet Take 1 tablet by mouth 2 (Two) Times a Day. 180 tablet 3   • Sennosides (SENNO PO) Take  by mouth As Needed.     • tiZANidine (ZANAFLEX) 4 MG tablet Take 4 mg by mouth Every Night. 2 tablets at bedtime     • tolterodine LA (DETROL LA) 4 MG 24 hr capsule Take 1 capsule by mouth daily.       No current facility-administered medications on file prior to visit.        Social History    Tobacco Use      Smoking status: Never Smoker      Smokeless tobacco: Never Used      Review of Systems  Pertinent items are noted in HPI, all other systems were reviewed and negative       Objective   /84   Ht 157.5 cm (62\")   Wt 58.1 kg (128 lb)   BMI 23.41 kg/m²     Physical Exam:  General Appearance: alert, pleasant, appears stated age, interactive and cooperative  Head: normocephalic, without obvious abnormality and atraumatic  Eyes: lids and lashes normal and no icterus  Ears: ears appear intact with no abnormalities noted  Nose: nares normal, septum midline, mucosa normal and no drainage  Neck: suppple, trachea midline and no thyromegaly  Back: no kyphosis present, no scoliosis present and range of motion normal  Lungs: respirations regular, respirations even and respirations unlabored, clear to auscultation bilaterally   Heart: regular rhythm and normal rate, normal S1, S2, no murmur, gallop, or rubs and no click  Breasts: Not performed.  Abdomen: no masses, no hepatomegaly, no splenomegaly, soft non-tender, no guarding and no rebound tenderness  Extremities: moves extremities well, no edema, no cyanosis and no redness  Skin: no bleeding, bruising or rash and no lesions noted  Lymph Nodes: no palpable adenopathy  Neurologic: Cranial Nerves cranial nerves 2 - 12 grossly intact, Speech normal content and execusion, Coordination normal  Psych: normal mood and affect, oriented to person, time and place, thought content organized " and appropriate judgment    Pelvis:  Pelvic: Clinical staff was present for exam  External genitalia:  normal appearance of the external genitalia including Bartholin's and [place]'s glands.  :  urethral meatus normal;  Vagina:  atrophic mucosal changes are present; small introitus.  Cervix:  absent.  Uterus:  absent.  Adnexa:  normal bimanual exam of the adnexa.  Rectal:  digital rectal exam not performed; anus visually normal appearing.  Cystocele GRADE 2  Rectocele GRADE 2  Vaginal vault prolapse GRADE 2    Review of Labs:   No data reviewed    Review of Imaging:  No data reviewed    Decision to obtain old records:  No.    Summarization of old records:  N/A    Independent review of image, tracing or specimen:  N/A       Assessment   Grade 2 vaginal vault prolapse, previous prolapse surgery  Vaginal atrophy  Mixed urinary incontinence  Significant medical comorbidities     Plan    Orders Placed This Encounter   Procedures   • Ambulatory Referral to Urology     Referral Priority:   Routine     Referral Type:   Consultation     Referral Reason:   Specialty Services Required     Referred to Provider:   Leroy Galo MD     Requested Specialty:   Urology     Number of Visits Requested:   1     Medications ordered: none    Procedures performed: pessary fitting    We reviewed her symptoms and exam findings in detail today.  She has significant prolapse, but the vaginal introitus is so small, there is unlikely to be further progression.  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  She has very little discomfort from the prolapse.  She is not a surgical candidate.  I was able to place a very small pessary as detailed below.  She understands this may worsen her incontinence.  She understands this may need to be removed if it provides no benefit/makes her urinary symptoms worse.  She will wear this for a week and let our clinic know if she has any improvement in symptoms.  Start vaginal  "estrogen cream twice weekly.    Given her situation, I recommend that she meet with urology.  We discussed the possibility of urethral bulking injections.    Pessary Fitting    Date of procedure:  9/4/2020    Risks and benefits discussed? yes  All questions answered? yes  Consents given by the patient  Written consent obtained? no    Pessary fitted: Foldable ring w/ support - #2 w/ urethral bar       Pessary's attempted without good fit: Foldable ring w/ support - 2 1/2\" w/ urethral bar     Post procedure instructions: Patient to return to office for placement when pessary available.  Rx given for the above pessary fitted.    El Baltazar MD  Obstetrics and Gynecology  [facility]  "

## 2020-09-23 PROBLEM — N39.46 URINARY INCONTINENCE, MIXED: Status: ACTIVE | Noted: 2020-01-01

## 2020-09-23 PROBLEM — N81.9 VAGINAL VAULT PROLAPSE: Status: ACTIVE | Noted: 2020-01-01

## 2020-09-23 NOTE — PROGRESS NOTES
Chief Complaint   Patient presents with   • Pessary Check     Vaginal irritation, still leaking urine.       Fani Bird is a 84 y.o. year old  presenting to be seen for follow-up prolapse and incontinence.    She reports worsening urinary incontinence with the pessary in place.  Vaginal irritation is also new symptoms since pessary was placed.  She and her daughter would like the device removed today.  They have an appointment scheduled Dr. Galo on 10/19.    Exam:  Pelvis:  Pelvic: Clinical staff was present for exam  External genitalia:  normal appearance of the external genitalia including Bartholin's and [place]'s glands.  :  urethral meatus normal;  Vagina:  atrophic mucosal changes are present; small introitus.  Cervix:  absent.  Uterus:  absent.  Adnexa:  normal bimanual exam of the adnexa.  Rectal:  digital rectal exam not performed; anus visually normal appearing.  Cystocele GRADE 2  Rectocele GRADE 2  Vaginal vault prolapse GRADE 2    Assessment/Plan:  Grade 2 vaginal vault prolapse, previous prolapse surgery  Vaginal atrophy  Mixed urinary incontinence  Significant medical co-morbidities    The pessary was removed in the office today.  I recommend no additional attempts at treatment for her prolapse.  I recommend continued use of vaginal estrogen cream.  I recommend consult with Dr. Galo to address urinary incontinence.    Greater than 50% of this 15 minute visit was spent in face-to-face counseling and/or coordination of care for this patient.    El Baltazar MD  Obstetrics and Gynecology  [facility]

## 2020-11-13 NOTE — PROGRESS NOTES
Chief Complaint:          Chief Complaint   Patient presents with   • Blood in Urine     cystoscopy        HPI:   84 y.o. female who was originally scheduled for work-up with cystoscopy for urinary incontinence chose not to keep it.  She went to [place] at the [facility] and got a pessary which was terrible caused a lot of pain she now wants either Botox or collagen.  Her blood sugar is fine.  The problem is we have never completed the work-up.  I am not sure why she had the pessary so she does not have any prolapse.  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  She is here for cystoscopy.  She is very feeble.  She is very hard of hearing.  Because of her heart she cannot be put to sleep.  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      Past Medical History:        Past Medical History:   Diagnosis Date   • Abnormal Pap smear of cervix    • Anxiety    • Cataract    • Cerebrovascular disease    • Chest pain    • CHF (congestive heart failure) (CMS/[facility])    • Coronary artery disease     no recent ischemic evaluation   • Diabetes mellitus (CMS/[facility])     Hemoglobin A1c of 8.3.   • Dyslipidemia     May 2012 - Total 156, triglycerides 297, HDL 47, and LDL 54.   • Dyspnea    • GERD (gastroesophageal reflux disease)    • Gestational diabetes    • Gestational hypertension    • Hypertension    • Pneumonia    • Swelling of lower extremity     improved with stockings   • Vitamin D deficiency          Current Meds:     Current Outpatient Medications   Medication Sig Dispense Refill   • albuterol (PROVENTIL) (2.5 MG/3ML) 0.083% nebulizer solution Take 2.5 mg by nebulization Every 4 (Four) Hours As Needed.     • albuterol (VENTOLIN HFA) 108 (90 BASE) MCG/ACT inhaler Inhale 1 puff 2 (Two)  Times a Day. Before taking Flovent     • amLODIPine (NORVASC) 10 MG tablet Take 1 tablet by mouth Daily. 90 tablet 3   • aspirin 81 MG tablet Take 81 mg by mouth Daily.     • atorvastatin (LIPITOR) 40 MG tablet Take 1 tablet by mouth Daily. 90 tablet 3   • carvedilol (COREG) 6.25 MG tablet Take 1 tablet by mouth 2 (Two) Times a Day. 180 tablet 3   • celecoxib (CELEBREX) 200 MG capsule Take 100 mg by mouth Daily.     • clonazePAM (KlonoPIN) 0.5 MG tablet Take 0.5 mg by mouth Daily.     • clopidogrel (PLAVIX) 75 MG tablet Take 1 tablet by mouth Daily. 90 tablet 3   • Cranberry 1000 MG capsule Take 1 capsule by mouth Daily.     • Cyanocobalamin (VITAMIN B-12 PO) Take 1 tablet by mouth 1 (One) Time Per Week. Taken on Saturday     • cycloSPORINE (RESTASIS) 0.05 % ophthalmic emulsion Apply 1 drop to eye(s) as directed by provider 2 (Two) Times a Day.     • estropipate (OGEN) 1.5 MG tablet Take 0.5 mg by mouth Daily.     • ferrous sulfate 325 (65 FE) MG tablet Take 325 mg by mouth Daily With Breakfast. 2 daily     • fexofenadine (ALLEGRA) 180 MG tablet Take 180 mg by mouth 2 (Two) Times a Day.     • fluticasone (FLOVENT HFA) 220 MCG/ACT inhaler Inhale 1 puff 2 (Two) Times a Day.     • furosemide (LASIX) 20 MG tablet Take 1 tablet by mouth Daily. Take 1-2 tablets daily as directed 180 tablet 1   • gabapentin (NEURONTIN) 400 MG capsule Take 400 mg by mouth 4 (Four) Times a Day.     • Insulin Glargine (Lantus SoloStar) 100 UNIT/ML injection pen Daily. Sliding scale     • lactulose (CHRONULAC) 10 GM/15ML solution Take 15 mL by mouth Every Night. 1892 mL 6   • losartan (COZAAR) 100 MG tablet Take 1 tablet by mouth Daily. 90 tablet 3   • Magnesium 400 MG tablet Take 1 tablet by mouth Daily. 30 tablet 11   • mometasone (NASONEX) 50 MCG/ACT nasal spray 2 sprays into the nostril(s) as directed by provider Every Evening.     • montelukast (SINGULAIR) 10 MG tablet Take 10 mg by mouth Every Night.     • Morphine (MS CONTIN) 15 MG 12  hr tablet Take 1 tablet by mouth 2 (Two) Times a Day As Needed for Severe Pain . 60 tablet 0   • Morphine (MS CONTIN) 30 MG 12 hr tablet Take 30 mg by mouth 2 (Two) Times a Day.     • nitrofurantoin (MACRODANTIN) 50 MG capsule Take 50 mg by mouth Every Night.     • nitroglycerin (Nitrostat) 0.4 MG SL tablet Place 1 tablet under the tongue Every 5 (Five) Minutes As Needed for Chest Pain. 25 tablet 3   • O2 (OXYGEN) Inhale 2 L/min At Night As Needed.     • pantoprazole (PROTONIX) 40 MG EC tablet Take 40 mg by mouth 2 (Two) Times a Day.     • potassium chloride (K-DUR) 10 MEQ CR tablet Take 1 tablet by mouth 2 (Two) Times a Day. 180 tablet 3   • Sennosides (SENNO PO) Take  by mouth As Needed.     • tiZANidine (ZANAFLEX) 4 MG tablet Take 4 mg by mouth Every Night. 2 tablets at bedtime     • tolterodine LA (DETROL LA) 4 MG 24 hr capsule Take 1 capsule by mouth daily.       No current facility-administered medications for this visit.         Allergies:      Allergies   Allergen Reactions   • Isosorbide Hives   • Augmentin [Amoxicillin-Pot Clavulanate] Diarrhea and Rash        Past Surgical History:     Past Surgical History:   Procedure Laterality Date   • BLADDER REPAIR     • BREAST BIOPSY     • BREAST CYST ASPIRATION     • CARDIAC CATHETERIZATION     • CARDIAC CATHETERIZATION Left 11/10/2018    Procedure: Cardiac Catheterization/Vascular Study;  Surgeon: Bethel Mercedes MD;  Location:  [facility] CATH INVASIVE LOCATION;  Service: Cardiology   • CARPAL TUNNEL RELEASE Bilateral    • CATARACT EXTRACTION     • CATARACT EXTRACTION  2018    right eye    • CHOLECYSTECTOMY     • CORONARY STENT PLACEMENT     • HYSTERECTOMY     • MO RT/LT HEART CATHETERS N/A 11/28/2018    Procedure: PERCUTANEOUS CORONARY INTERVENTION;  Surgeon: Bethel Mercedes MD;  Location:  [facility] CATH INVASIVE LOCATION;  Service: Cardiology   • SPINAL FUSION           Social History:     Social History     Socioeconomic History   • Marital status:      Spouse  name: Not on file   • Number of children: Not on file   • Years of education: Not on file   • Highest education level: Not on file   Occupational History   • Occupation: retired   Tobacco Use   • Smoking status: Never Smoker   • Smokeless tobacco: Never Used   Substance and Sexual Activity   • Alcohol use: No   • Drug use: No   • Sexual activity: Defer   Social History Narrative    Caffeine: 3 servings per day    Patient lives with her urmila       Family History:     Family History   Problem Relation Age of Onset   • Heart disease Mother    • Hypertension Mother    • Heart disease Father    • Hypertension Father    • Heart attack Father    • Diabetes Father    • Diabetes Brother    • Liver cancer Brother    • Hypertension Brother    • Stroke Maternal Grandmother    • Heart attack Maternal Grandmother    • Stroke Maternal Grandfather    • No Known Problems Paternal Grandmother    • No Known Problems Paternal Grandfather        Review of Systems:     Review of Systems   Constitutional: Negative.  Negative for activity change, appetite change, chills, diaphoresis, fatigue and unexpected weight change.   HENT: Negative for congestion, dental problem, drooling, ear discharge, ear pain, facial swelling, hearing loss, mouth sores, nosebleeds, postnasal drip, rhinorrhea, sinus pressure, sneezing, sore throat, tinnitus, trouble swallowing and voice change.    Eyes: Negative.  Negative for photophobia, pain, discharge, redness, itching and visual disturbance.   Respiratory: Negative.  Negative for apnea, cough, choking, chest tightness, shortness of breath, wheezing and stridor.    Cardiovascular: Negative.  Negative for chest pain, palpitations and leg swelling.   Gastrointestinal: Negative.  Negative for abdominal distention, abdominal pain, anal bleeding, blood in stool, constipation, diarrhea, nausea, rectal pain and vomiting.   Endocrine: Negative.  Negative for cold intolerance, heat intolerance, polydipsia,  polyphagia and polyuria.   Genitourinary: Positive for difficulty urinating, dysuria, flank pain, frequency, pelvic pain and urgency.   Musculoskeletal: Negative for arthralgias, back pain, gait problem, joint swelling, myalgias, neck pain and neck stiffness.   Skin: Negative.  Negative for color change, pallor, rash and wound.   Allergic/Immunologic: Negative.  Negative for environmental allergies, food allergies and immunocompromised state.   Neurological: Negative.  Negative for dizziness, tremors, seizures, syncope, facial asymmetry, speech difficulty, weakness, light-headedness, numbness and headaches.   Hematological: Negative.  Negative for adenopathy. Does not bruise/bleed easily.   Psychiatric/Behavioral: Negative for agitation, behavioral problems, confusion, decreased concentration, dysphoric mood, hallucinations, self-injury, sleep disturbance and suicidal ideas. The patient is not nervous/anxious and is not hyperactive.    All other systems reviewed and are negative.      Physical Exam:     Physical Exam  Constitutional:       Appearance: She is well-developed.   HENT:      Head: Normocephalic and atraumatic.      Right Ear: External ear normal.      Left Ear: External ear normal.   Eyes:      Conjunctiva/sclera: Conjunctivae normal.      Pupils: Pupils are equal, round, and reactive to light.   Cardiovascular:      Rate and Rhythm: Normal rate and regular rhythm.      Heart sounds: Normal heart sounds.   Pulmonary:      Effort: Pulmonary effort is normal.      Breath sounds: Normal breath sounds.   Abdominal:      General: Bowel sounds are normal. There is no distension.      Palpations: Abdomen is soft. There is no mass.      Tenderness: There is no abdominal tenderness. There is no guarding or rebound.   Genitourinary:     General: Normal vulva.      Vagina: No vaginal discharge.   Musculoskeletal: Normal range of motion.   Skin:     General: Skin is warm and dry.   Neurological:      Mental Status:  She is alert.      Deep Tendon Reflexes: Reflexes are normal and symmetric.   Psychiatric:         Behavior: Behavior normal.         Thought Content: Thought content normal.         Judgment: Judgment normal.         I have reviewed the following portions of the patient's history: allergies, current medications, past family history, past medical history, past social history, past surgical history, problem list and ROS and confirm it's accurate.      Procedure:   Cystoscopy:  Patient presents today for cystourethroscopy.  I went ahead and obtained an informed consent including the risk of anesthesia, bleeding, infection, etc.  After prep and drape in a sterile fashion in the low dorsal lithotomy position the urethra was gently anesthetized with 10 cc of 2% viscous Xylocaine jelly.  After an appropriate period of topical anesthesia I used the Olympus digital 14 [place] flexible cystoscope to examine the anterior urethra which was completely normal, the ureteral orifices were visualized and normal in position and configuration there were no stones, tumors or foreign bodies.  The blue light was enabled and was negative allowing us to see small mucosal lesions. The patient was given 80 mg of gentamicin in an intramuscular fashion  as prophylaxis for the cystoscopy and released from the clinic.  Simple urodynamics-following a careful discussion of the risks and benefits of simple urodynamics.  This includes a 2% risk of a urinary tract infection.  The patient's brought the operative suite after appropriate endoscopy and prep and drape in a sterile fashion using the spinal manometric technique for simple cystometrogram at 50 cc/m first sensation was noted at 50  cc.  Patient developed fullness at 250  cc.  There were  no detrusor contractions.  Upon Valsalva elevation of the bladder neck revealed cessation of the obvious stress incontinence.  This is known as the Eduar test which was positive.  There is no evidence of  other abnormalities during this investigation.    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  It is not uncommon to be found after stroke, multiple sclerosis, or other more rare upper motor neuron lesions.  It responds very nicely to anticholinergic medication  Urinary incontinence:  Patient was diagnosed with urinary incontinence.  We discussed treatable and non-treatable causes of both stress and urge urinary incontinence.  With regards to stress urinary incontinence we discussed its relationship to childbirth and pelvic health.  We discussed the grading of stress incontinence with trying to quantitate the number of pads used.  We talked about leaking urine with laughing, lifting, coughing, and sexual intercourse.  Talked about the urge component and the concept of mixed incontinence where upon the stress treatable at the urge may exist and that 50% of the time the urge will resolve with treatment of the stress incontinence.  We talked with the diagnostic workup including a postvoid residual urine, urine and even a simple cystometrogram.  I discussed the findings that may be neurologically related including commonly seen with multiple sclerosis, Parkinson's disease, and stroke.  I talked about the various therapeutic options including anticholinergics, beta 3 agonists, and alpha blockade if there is a component of obstruction.  I discussed the side effects of anti-cholinergic including dry mouth, double vision etc. fortunately she is not a candidate for anything surgical Sorg and I recommended cotton padding      Patient reports that she is  currently experiencing any symptoms of urinary incontinence.      Patient's Body mass index is 23.23 kg/m². BMI is within normal parameters. No follow-up required..              This document has been electronically signed by CHETAN GARZA MD November  13, 2020 15:35 EST

## 2020-11-16 PROBLEM — N39.3 SUI (STRESS URINARY INCONTINENCE, FEMALE): Status: ACTIVE | Noted: 2020-01-01

## 2020-11-16 PROBLEM — N32.81 DETRUSOR HYPERREFLEXIA: Status: ACTIVE | Noted: 2020-01-01

## (undated) DEVICE — GUIDE CATHETER: Brand: MACH1™

## (undated) DEVICE — MODEL AT P65, P/N 701554-001KIT CONTENTS: HAND CONTROLLER, 3-WAY HIGH-PRESSURE STOPCOCK WITH ROTATING END AND PREMIUM HIGH-PRESSURE TUBING: Brand: ANGIOTOUCH® KIT

## (undated) DEVICE — MODEL BT2000 P/N 700287-012KIT CONTENTS: MANIFOLD WITH SALINE AND CONTRAST PORTS, SALINE TUBING WITH SPIKE AND HAND SYRINGE, TRANSDUCER: Brand: BT2000 AUTOMATED MANIFOLD KIT

## (undated) DEVICE — ST INF PRI SMRTSTE 20DRP 2VLV 24ML 117

## (undated) DEVICE — TREK CORONARY DILATATION CATHETER 2.50 MM X 15 MM / RAPID-EXCHANGE: Brand: TREK

## (undated) DEVICE — ST ACC MICROPUNCTURE .018 TRANSLSS/SS/TP 5F/10CM 21G/7CM

## (undated) DEVICE — PINNACLE INTRODUCER SHEATH: Brand: PINNACLE

## (undated) DEVICE — Device: Brand: ANGIOSCULPT® PTCA SCORING BALLOON CATHETER

## (undated) DEVICE — TREK CORONARY DILATATION CATHETER 3.0 MM X 12 MM / RAPID-EXCHANGE: Brand: TREK

## (undated) DEVICE — MINI TREK CORONARY DILATATION CATHETER 2.0 MM X 15 MM / RAPID-EXCHANGE: Brand: MINI TREK

## (undated) DEVICE — GW J TP FIX CORE .035 150

## (undated) DEVICE — PK CATH CARD 10

## (undated) DEVICE — Device: Brand: ASAHI SION BLUE

## (undated) DEVICE — ANGIO-SEAL EVOLUTION VASCULAR CLOSURE DEVICE: Brand: ANGIO-SEAL

## (undated) DEVICE — MINI TREK CORONARY DILATATION CATHETER 1.50 MM X 15 MM / RAPID-EXCHANGE: Brand: MINI TREK

## (undated) DEVICE — GW FIX CORE J .063

## (undated) DEVICE — NC TREK CORONARY DILATATION CATHETER 3.0 MM X 12 MM / RAPID-EXCHANGE: Brand: NC TREK

## (undated) DEVICE — ANGIO-SEAL VIP VASCULAR CLOSURE DEVICE: Brand: ANGIO-SEAL

## (undated) DEVICE — TREK CORONARY DILATATION CATHETER 3.0 MM X 20 MM / RAPID-EXCHANGE: Brand: TREK

## (undated) DEVICE — Device

## (undated) DEVICE — ST EXT IV SMARTSITE 2VLV SP M LL 5ML IV1

## (undated) DEVICE — TREK CORONARY DILATATION CATHETER 3.0 MM X 15 MM / RAPID-EXCHANGE: Brand: TREK

## (undated) DEVICE — DRSNG PRESS SAFEGUARD

## (undated) DEVICE — CATH DIAG EXPO M/ PK 6FR FL4/FR4 PIG 3PK

## (undated) DEVICE — NC TREK CORONARY DILATATION CATHETER 3.5 MM X 8 MM / RAPID-EXCHANGE: Brand: NC TREK

## (undated) DEVICE — GUIDELINER CATHETERS ARE INTENDED TO BE USED IN CONJUNCTION WITH GUIDE CATHETERS TO ACCESS DISCRETE REGIONS OF THE CORONARY AND/OR PERIPHERAL VASCULATURE, AND TO FACILITATE PLACEMENT OF INTERVENTIONAL DEVICES.: Brand: GUIDELINER® V3 CATHETER

## (undated) DEVICE — DEV INFL MONARCH 25W